# Patient Record
Sex: FEMALE | Race: WHITE | NOT HISPANIC OR LATINO | Employment: OTHER | URBAN - METROPOLITAN AREA
[De-identification: names, ages, dates, MRNs, and addresses within clinical notes are randomized per-mention and may not be internally consistent; named-entity substitution may affect disease eponyms.]

---

## 2020-03-20 ENCOUNTER — OFFICE VISIT (OUTPATIENT)
Dept: URGENT CARE | Facility: CLINIC | Age: 60
End: 2020-03-20
Payer: COMMERCIAL

## 2020-03-20 ENCOUNTER — HOSPITAL ENCOUNTER (EMERGENCY)
Facility: HOSPITAL | Age: 60
Discharge: HOME | End: 2020-03-20
Attending: EMERGENCY MEDICINE | Admitting: EMERGENCY MEDICINE
Payer: COMMERCIAL

## 2020-03-20 VITALS
TEMPERATURE: 98.1 F | SYSTOLIC BLOOD PRESSURE: 162 MMHG | OXYGEN SATURATION: 95 % | DIASTOLIC BLOOD PRESSURE: 84 MMHG | RESPIRATION RATE: 20 BRPM | HEART RATE: 60 BPM

## 2020-03-20 VITALS
HEIGHT: 62 IN | TEMPERATURE: 97.8 F | RESPIRATION RATE: 17 BRPM | SYSTOLIC BLOOD PRESSURE: 157 MMHG | HEART RATE: 58 BPM | DIASTOLIC BLOOD PRESSURE: 98 MMHG | WEIGHT: 190 LBS | BODY MASS INDEX: 34.96 KG/M2 | OXYGEN SATURATION: 96 %

## 2020-03-20 DIAGNOSIS — H11.421 CHEMOSIS OF RIGHT CONJUNCTIVA: ICD-10-CM

## 2020-03-20 DIAGNOSIS — H57.11 EYE PAIN, RIGHT: ICD-10-CM

## 2020-03-20 DIAGNOSIS — H11.31 SUBCONJUNCTIVAL HEMORRHAGE OF RIGHT EYE: Primary | ICD-10-CM

## 2020-03-20 PROCEDURE — 99284 EMERGENCY DEPT VISIT MOD MDM: CPT | Performed by: PHYSICIAN ASSISTANT

## 2020-03-20 PROCEDURE — 99213 OFFICE O/P EST LOW 20 MIN: CPT | Performed by: FAMILY MEDICINE

## 2020-03-20 PROCEDURE — 99283 EMERGENCY DEPT VISIT LOW MDM: CPT

## 2020-03-20 RX ORDER — GABAPENTIN 600 MG/1
1200 TABLET ORAL 3 TIMES DAILY
COMMUNITY
Start: 2020-03-09

## 2020-03-20 RX ORDER — TETRACAINE HYDROCHLORIDE 5 MG/ML
2 SOLUTION OPHTHALMIC ONCE
Status: COMPLETED | OUTPATIENT
Start: 2020-03-20 | End: 2020-03-20

## 2020-03-20 RX ORDER — GABAPENTIN 400 MG/1
600 CAPSULE ORAL EVERY 8 HOURS
COMMUNITY
Start: 2018-11-30

## 2020-03-20 RX ORDER — AZATHIOPRINE 50 MG/1
150 TABLET ORAL DAILY
COMMUNITY
Start: 2020-03-12

## 2020-03-20 RX ORDER — LOSARTAN POTASSIUM 50 MG/1
50 TABLET ORAL DAILY
COMMUNITY
Start: 2020-03-09

## 2020-03-20 RX ORDER — CARBOXYMETHYLCELLULOSE SODIUM 5 MG/ML
1 SOLUTION/ DROPS OPHTHALMIC 3 TIMES DAILY PRN
Qty: 1 BOTTLE | Refills: 0 | Status: SHIPPED | OUTPATIENT
Start: 2020-03-20

## 2020-03-20 RX ORDER — PREDNISONE 1 MG/1
TABLET ORAL
COMMUNITY
Start: 2020-03-16

## 2020-03-20 RX ADMIN — TETRACAINE HYDROCHLORIDE 2 DROP: 5 SOLUTION OPHTHALMIC at 14:04

## 2020-03-20 RX ADMIN — FLUORESCEIN SODIUM 1 STRIP: 1 STRIP OPHTHALMIC at 14:03
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
## 2020-04-01 ENCOUNTER — OFFICE VISIT (OUTPATIENT)
Dept: URGENT CARE | Facility: CLINIC | Age: 60
End: 2020-04-01
Payer: COMMERCIAL

## 2020-04-01 VITALS
TEMPERATURE: 98 F | DIASTOLIC BLOOD PRESSURE: 82 MMHG | SYSTOLIC BLOOD PRESSURE: 130 MMHG | OXYGEN SATURATION: 97 % | HEART RATE: 60 BPM | BODY MASS INDEX: 33.66 KG/M2 | WEIGHT: 190 LBS | RESPIRATION RATE: 16 BRPM | HEIGHT: 63 IN

## 2020-04-01 DIAGNOSIS — J06.9 ACUTE URI: Primary | ICD-10-CM

## 2020-04-01 PROCEDURE — 99213 OFFICE O/P EST LOW 20 MIN: CPT | Performed by: PHYSICIAN ASSISTANT

## 2020-04-01 RX ORDER — ALBUTEROL SULFATE 90 UG/1
2 AEROSOL, METERED RESPIRATORY (INHALATION) EVERY 6 HOURS PRN
COMMUNITY
End: 2020-04-01 | Stop reason: SDUPTHER

## 2020-04-01 RX ORDER — BENZONATATE 100 MG/1
100 CAPSULE ORAL 3 TIMES DAILY PRN
Qty: 20 CAPSULE | Refills: 0 | Status: SHIPPED | OUTPATIENT
Start: 2020-04-01

## 2020-04-01 RX ORDER — FLUTICASONE PROPIONATE 50 MCG
2 SPRAY, SUSPENSION (ML) NASAL DAILY
Qty: 9.9 ML | Refills: 0 | Status: SHIPPED | OUTPATIENT
Start: 2020-04-01

## 2020-04-01 RX ORDER — ALENDRONATE SODIUM 70 MG/1
70 TABLET ORAL
COMMUNITY

## 2020-04-01 RX ORDER — ALBUTEROL SULFATE 90 UG/1
2 AEROSOL, METERED RESPIRATORY (INHALATION) EVERY 6 HOURS PRN
Qty: 18 G | Refills: 0 | Status: SHIPPED | OUTPATIENT
Start: 2020-04-01

## 2022-10-14 ENCOUNTER — APPOINTMENT (EMERGENCY)
Dept: CT IMAGING | Facility: HOSPITAL | Age: 62
DRG: 394 | End: 2022-10-14
Payer: COMMERCIAL

## 2022-10-14 ENCOUNTER — HOSPITAL ENCOUNTER (INPATIENT)
Facility: HOSPITAL | Age: 62
LOS: 5 days | Discharge: HOME | DRG: 394 | End: 2022-10-19
Attending: EMERGENCY MEDICINE | Admitting: STUDENT IN AN ORGANIZED HEALTH CARE EDUCATION/TRAINING PROGRAM
Payer: COMMERCIAL

## 2022-10-14 DIAGNOSIS — M79.89 LEG SWELLING: ICD-10-CM

## 2022-10-14 DIAGNOSIS — K43.9 VENTRAL HERNIA: ICD-10-CM

## 2022-10-14 DIAGNOSIS — R50.9 FEVER, UNSPECIFIED FEVER CAUSE: ICD-10-CM

## 2022-10-14 DIAGNOSIS — N30.00 ACUTE CYSTITIS WITHOUT HEMATURIA: ICD-10-CM

## 2022-10-14 DIAGNOSIS — K56.609 SBO (SMALL BOWEL OBSTRUCTION) (HCC): ICD-10-CM

## 2022-10-14 DIAGNOSIS — K56.609 BOWEL OBSTRUCTION (HCC): Primary | ICD-10-CM

## 2022-10-14 PROBLEM — I82.409 DEEP VEIN THROMBOSIS (DVT) OF LOWER EXTREMITY (HCC): Status: ACTIVE | Noted: 2022-10-14

## 2022-10-14 PROBLEM — IMO0002 LUPUS: Status: ACTIVE | Noted: 2019-11-04

## 2022-10-14 PROBLEM — S36.113A LACERATION OF LIVER: Status: ACTIVE | Noted: 2022-10-14

## 2022-10-14 PROBLEM — M94.1 RELAPSING POLYCHONDRITIS: Status: ACTIVE | Noted: 2017-06-01

## 2022-10-14 PROBLEM — K21.9 GASTROESOPHAGEAL REFLUX DISEASE WITHOUT ESOPHAGITIS: Status: ACTIVE | Noted: 2018-12-04

## 2022-10-14 PROBLEM — D84.9 IMMUNOCOMPROMISED (HCC): Status: ACTIVE | Noted: 2022-09-27

## 2022-10-14 PROBLEM — Z79.52 LONG TERM (CURRENT) USE OF SYSTEMIC STEROIDS: Status: ACTIVE | Noted: 2017-06-01

## 2022-10-14 PROBLEM — M32.9 LUPUS (HCC): Status: ACTIVE | Noted: 2019-11-04

## 2022-10-14 PROBLEM — M06.9 RA (RHEUMATOID ARTHRITIS) (HCC): Status: ACTIVE | Noted: 2022-10-14

## 2022-10-14 PROBLEM — I10 ESSENTIAL HYPERTENSION: Status: ACTIVE | Noted: 2018-12-04

## 2022-10-14 PROBLEM — M35.00 SJOGREN'S SYNDROME (HCC): Status: ACTIVE | Noted: 2020-07-27

## 2022-10-14 LAB
ALBUMIN SERPL BCP-MCNC: 2.7 G/DL (ref 3.5–5)
ALP SERPL-CCNC: 97 U/L (ref 46–116)
ALT SERPL W P-5'-P-CCNC: 19 U/L (ref 12–78)
ANION GAP SERPL CALCULATED.3IONS-SCNC: 6 MMOL/L (ref 4–13)
APTT PPP: 28 SECONDS (ref 23–37)
APTT PPP: 28 SECONDS (ref 23–37)
AST SERPL W P-5'-P-CCNC: 17 U/L (ref 5–45)
BACTERIA UR QL AUTO: ABNORMAL /HPF
BASOPHILS # BLD AUTO: 0.06 THOUSANDS/ΜL (ref 0–0.1)
BASOPHILS NFR BLD AUTO: 1 % (ref 0–1)
BILIRUB SERPL-MCNC: 0.93 MG/DL (ref 0.2–1)
BILIRUB UR QL STRIP: NEGATIVE
BUN SERPL-MCNC: 14 MG/DL (ref 5–25)
CALCIUM ALBUM COR SERPL-MCNC: 8.9 MG/DL (ref 8.3–10.1)
CALCIUM SERPL-MCNC: 7.9 MG/DL (ref 8.3–10.1)
CARDIAC TROPONIN I PNL SERPL HS: 3 NG/L
CARDIAC TROPONIN I PNL SERPL HS: 3 NG/L
CHLORIDE SERPL-SCNC: 97 MMOL/L (ref 96–108)
CLARITY UR: CLEAR
CO2 SERPL-SCNC: 31 MMOL/L (ref 21–32)
COLOR UR: ABNORMAL
CREAT SERPL-MCNC: 0.85 MG/DL (ref 0.6–1.3)
EOSINOPHIL # BLD AUTO: 0.03 THOUSAND/ΜL (ref 0–0.61)
EOSINOPHIL NFR BLD AUTO: 0 % (ref 0–6)
ERYTHROCYTE [DISTWIDTH] IN BLOOD BY AUTOMATED COUNT: 15.3 % (ref 11.6–15.1)
ERYTHROCYTE [DISTWIDTH] IN BLOOD BY AUTOMATED COUNT: 15.6 % (ref 11.6–15.1)
GFR SERPL CREATININE-BSD FRML MDRD: 74 ML/MIN/1.73SQ M
GLUCOSE SERPL-MCNC: 75 MG/DL (ref 65–140)
GLUCOSE UR STRIP-MCNC: NEGATIVE MG/DL
HCT VFR BLD AUTO: 30 % (ref 34.8–46.1)
HCT VFR BLD AUTO: 36.7 % (ref 34.8–46.1)
HGB BLD-MCNC: 11.9 G/DL (ref 11.5–15.4)
HGB BLD-MCNC: 9.5 G/DL (ref 11.5–15.4)
HGB UR QL STRIP.AUTO: NEGATIVE
IMM GRANULOCYTES # BLD AUTO: 0.07 THOUSAND/UL (ref 0–0.2)
IMM GRANULOCYTES NFR BLD AUTO: 1 % (ref 0–2)
INR PPP: 1.05 (ref 0.84–1.19)
KETONES UR STRIP-MCNC: NEGATIVE MG/DL
LACTATE SERPL-SCNC: 1.7 MMOL/L (ref 0.5–2)
LEUKOCYTE ESTERASE UR QL STRIP: ABNORMAL
LIPASE SERPL-CCNC: 65 U/L (ref 73–393)
LYMPHOCYTES # BLD AUTO: 0.87 THOUSANDS/ΜL (ref 0.6–4.47)
LYMPHOCYTES NFR BLD AUTO: 8 % (ref 14–44)
MAGNESIUM SERPL-MCNC: 1.7 MG/DL (ref 1.6–2.6)
MCH RBC QN AUTO: 29.1 PG (ref 26.8–34.3)
MCH RBC QN AUTO: 29.5 PG (ref 26.8–34.3)
MCHC RBC AUTO-ENTMCNC: 31.7 G/DL (ref 31.4–37.4)
MCHC RBC AUTO-ENTMCNC: 32.4 G/DL (ref 31.4–37.4)
MCV RBC AUTO: 91 FL (ref 82–98)
MCV RBC AUTO: 92 FL (ref 82–98)
MONOCYTES # BLD AUTO: 1.2 THOUSAND/ΜL (ref 0.17–1.22)
MONOCYTES NFR BLD AUTO: 11 % (ref 4–12)
NEUTROPHILS # BLD AUTO: 8.45 THOUSANDS/ΜL (ref 1.85–7.62)
NEUTS SEG NFR BLD AUTO: 79 % (ref 43–75)
NITRITE UR QL STRIP: POSITIVE
NON-SQ EPI CELLS URNS QL MICRO: ABNORMAL /HPF
NRBC BLD AUTO-RTO: 0 /100 WBCS
NT-PROBNP SERPL-MCNC: 3131 PG/ML
OTHER STN SPEC: ABNORMAL
PH UR STRIP.AUTO: 6.5 [PH]
PLATELET # BLD AUTO: 295 THOUSANDS/UL (ref 149–390)
PLATELET # BLD AUTO: 364 THOUSANDS/UL (ref 149–390)
PMV BLD AUTO: 9.6 FL (ref 8.9–12.7)
PMV BLD AUTO: 9.7 FL (ref 8.9–12.7)
POTASSIUM SERPL-SCNC: 3.3 MMOL/L (ref 3.5–5.3)
PROCALCITONIN SERPL-MCNC: 0.18 NG/ML
PROT SERPL-MCNC: 6.4 G/DL (ref 6.4–8.4)
PROT UR STRIP-MCNC: NEGATIVE MG/DL
PROTHROMBIN TIME: 13.8 SECONDS (ref 11.6–14.5)
RBC # BLD AUTO: 3.27 MILLION/UL (ref 3.81–5.12)
RBC # BLD AUTO: 4.03 MILLION/UL (ref 3.81–5.12)
RBC #/AREA URNS AUTO: ABNORMAL /HPF
SODIUM SERPL-SCNC: 134 MMOL/L (ref 135–147)
SP GR UR STRIP.AUTO: 1.01 (ref 1–1.03)
UROBILINOGEN UR QL STRIP.AUTO: 1 E.U./DL
WBC # BLD AUTO: 10.68 THOUSAND/UL (ref 4.31–10.16)
WBC # BLD AUTO: 8.26 THOUSAND/UL (ref 4.31–10.16)
WBC #/AREA URNS AUTO: ABNORMAL /HPF

## 2022-10-14 PROCEDURE — 74177 CT ABD & PELVIS W/CONTRAST: CPT

## 2022-10-14 PROCEDURE — 83880 ASSAY OF NATRIURETIC PEPTIDE: CPT | Performed by: EMERGENCY MEDICINE

## 2022-10-14 PROCEDURE — 99285 EMERGENCY DEPT VISIT HI MDM: CPT

## 2022-10-14 PROCEDURE — 93005 ELECTROCARDIOGRAM TRACING: CPT

## 2022-10-14 PROCEDURE — 83690 ASSAY OF LIPASE: CPT | Performed by: EMERGENCY MEDICINE

## 2022-10-14 PROCEDURE — 84484 ASSAY OF TROPONIN QUANT: CPT | Performed by: PHYSICIAN ASSISTANT

## 2022-10-14 PROCEDURE — 81001 URINALYSIS AUTO W/SCOPE: CPT | Performed by: EMERGENCY MEDICINE

## 2022-10-14 PROCEDURE — 71260 CT THORAX DX C+: CPT

## 2022-10-14 PROCEDURE — 83735 ASSAY OF MAGNESIUM: CPT | Performed by: EMERGENCY MEDICINE

## 2022-10-14 PROCEDURE — G1004 CDSM NDSC: HCPCS

## 2022-10-14 PROCEDURE — 99285 EMERGENCY DEPT VISIT HI MDM: CPT | Performed by: EMERGENCY MEDICINE

## 2022-10-14 PROCEDURE — 83605 ASSAY OF LACTIC ACID: CPT | Performed by: EMERGENCY MEDICINE

## 2022-10-14 PROCEDURE — 85730 THROMBOPLASTIN TIME PARTIAL: CPT | Performed by: PHYSICIAN ASSISTANT

## 2022-10-14 PROCEDURE — 36415 COLL VENOUS BLD VENIPUNCTURE: CPT | Performed by: EMERGENCY MEDICINE

## 2022-10-14 PROCEDURE — 85610 PROTHROMBIN TIME: CPT | Performed by: PHYSICIAN ASSISTANT

## 2022-10-14 PROCEDURE — 85027 COMPLETE CBC AUTOMATED: CPT | Performed by: PHYSICIAN ASSISTANT

## 2022-10-14 PROCEDURE — 85025 COMPLETE CBC W/AUTO DIFF WBC: CPT | Performed by: EMERGENCY MEDICINE

## 2022-10-14 PROCEDURE — 84484 ASSAY OF TROPONIN QUANT: CPT | Performed by: EMERGENCY MEDICINE

## 2022-10-14 PROCEDURE — 80053 COMPREHEN METABOLIC PANEL: CPT | Performed by: EMERGENCY MEDICINE

## 2022-10-14 PROCEDURE — 84145 PROCALCITONIN (PCT): CPT | Performed by: PHYSICIAN ASSISTANT

## 2022-10-14 PROCEDURE — 87040 BLOOD CULTURE FOR BACTERIA: CPT | Performed by: EMERGENCY MEDICINE

## 2022-10-14 RX ORDER — PREDNISONE 10 MG/1
10 TABLET ORAL DAILY
Status: DISCONTINUED | OUTPATIENT
Start: 2022-10-15 | End: 2022-10-19 | Stop reason: HOSPADM

## 2022-10-14 RX ORDER — AZATHIOPRINE 50 MG/1
150 TABLET ORAL DAILY
Status: DISCONTINUED | OUTPATIENT
Start: 2022-10-15 | End: 2022-10-19 | Stop reason: HOSPADM

## 2022-10-14 RX ORDER — HYDROXYCHLOROQUINE SULFATE 200 MG/1
200 TABLET, FILM COATED ORAL DAILY
COMMUNITY
Start: 2022-06-22

## 2022-10-14 RX ORDER — HEPARIN SODIUM 1000 [USP'U]/ML
5200 INJECTION, SOLUTION INTRAVENOUS; SUBCUTANEOUS
Status: DISCONTINUED | OUTPATIENT
Start: 2022-10-14 | End: 2022-10-15

## 2022-10-14 RX ORDER — HEPARIN SODIUM 1000 [USP'U]/ML
2600 INJECTION, SOLUTION INTRAVENOUS; SUBCUTANEOUS
Status: DISCONTINUED | OUTPATIENT
Start: 2022-10-14 | End: 2022-10-15

## 2022-10-14 RX ORDER — HEPARIN SODIUM 10000 [USP'U]/100ML
3-30 INJECTION, SOLUTION INTRAVENOUS
Status: DISCONTINUED | OUTPATIENT
Start: 2022-10-14 | End: 2022-10-15

## 2022-10-14 RX ORDER — ONDANSETRON 2 MG/ML
4 INJECTION INTRAMUSCULAR; INTRAVENOUS EVERY 6 HOURS PRN
Status: DISCONTINUED | OUTPATIENT
Start: 2022-10-14 | End: 2022-10-19 | Stop reason: HOSPADM

## 2022-10-14 RX ORDER — CEFEPIME HYDROCHLORIDE 2 G/50ML
2000 INJECTION, SOLUTION INTRAVENOUS EVERY 12 HOURS
Status: DISCONTINUED | OUTPATIENT
Start: 2022-10-14 | End: 2022-10-16

## 2022-10-14 RX ORDER — ACETAMINOPHEN 325 MG/1
650 TABLET ORAL EVERY 6 HOURS PRN
Status: DISCONTINUED | OUTPATIENT
Start: 2022-10-14 | End: 2022-10-19 | Stop reason: HOSPADM

## 2022-10-14 RX ORDER — HEPARIN SODIUM 1000 [USP'U]/ML
5200 INJECTION, SOLUTION INTRAVENOUS; SUBCUTANEOUS ONCE
Status: COMPLETED | OUTPATIENT
Start: 2022-10-14 | End: 2022-10-14

## 2022-10-14 RX ORDER — GABAPENTIN 300 MG/1
600 CAPSULE ORAL EVERY 8 HOURS
Status: DISCONTINUED | OUTPATIENT
Start: 2022-10-14 | End: 2022-10-19 | Stop reason: HOSPADM

## 2022-10-14 RX ORDER — ALBUTEROL SULFATE 90 UG/1
2 AEROSOL, METERED RESPIRATORY (INHALATION) EVERY 6 HOURS PRN
Status: DISCONTINUED | OUTPATIENT
Start: 2022-10-14 | End: 2022-10-19 | Stop reason: HOSPADM

## 2022-10-14 RX ORDER — HYDROXYCHLOROQUINE SULFATE 200 MG/1
200 TABLET, FILM COATED ORAL 2 TIMES DAILY
Status: DISCONTINUED | OUTPATIENT
Start: 2022-10-14 | End: 2022-10-18

## 2022-10-14 RX ADMIN — CEFEPIME HYDROCHLORIDE 2000 MG: 2 INJECTION, SOLUTION INTRAVENOUS at 23:10

## 2022-10-14 RX ADMIN — HEPARIN SODIUM 5200 UNITS: 1000 INJECTION INTRAVENOUS; SUBCUTANEOUS at 21:50

## 2022-10-14 RX ADMIN — IOHEXOL 100 ML: 350 INJECTION, SOLUTION INTRAVENOUS at 17:09

## 2022-10-14 RX ADMIN — GABAPENTIN 600 MG: 300 CAPSULE ORAL at 21:54

## 2022-10-14 RX ADMIN — HEPARIN SODIUM 18 UNITS/KG/HR: 10000 INJECTION, SOLUTION INTRAVENOUS at 21:51
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
## 2022-10-15 ENCOUNTER — APPOINTMENT (INPATIENT)
Dept: NON INVASIVE DIAGNOSTICS | Facility: HOSPITAL | Age: 62
DRG: 394 | End: 2022-10-15
Payer: COMMERCIAL

## 2022-10-15 PROBLEM — S36.113A LACERATION OF LIVER: Status: RESOLVED | Noted: 2022-10-14 | Resolved: 2022-10-15

## 2022-10-15 PROBLEM — N30.00 ACUTE CYSTITIS WITHOUT HEMATURIA: Status: ACTIVE | Noted: 2022-10-15

## 2022-10-15 PROBLEM — Z79.52 LONG TERM (CURRENT) USE OF SYSTEMIC STEROIDS: Status: RESOLVED | Noted: 2017-06-01 | Resolved: 2022-10-15

## 2022-10-15 LAB
2HR DELTA HS TROPONIN: 0 NG/L
4HR DELTA HS TROPONIN: 0 NG/L
ANION GAP SERPL CALCULATED.3IONS-SCNC: 4 MMOL/L (ref 4–13)
APTT PPP: 60 SECONDS (ref 23–37)
APTT PPP: 67 SECONDS (ref 23–37)
BASOPHILS # BLD AUTO: 0.07 THOUSANDS/ΜL (ref 0–0.1)
BASOPHILS NFR BLD AUTO: 1 % (ref 0–1)
BUN SERPL-MCNC: 13 MG/DL (ref 5–25)
CALCIUM SERPL-MCNC: 8.1 MG/DL (ref 8.3–10.1)
CARDIAC TROPONIN I PNL SERPL HS: 3 NG/L
CARDIAC TROPONIN I PNL SERPL HS: 3 NG/L
CHLORIDE SERPL-SCNC: 100 MMOL/L (ref 96–108)
CO2 SERPL-SCNC: 32 MMOL/L (ref 21–32)
CREAT SERPL-MCNC: 0.73 MG/DL (ref 0.6–1.3)
EOSINOPHIL # BLD AUTO: 0.07 THOUSAND/ΜL (ref 0–0.61)
EOSINOPHIL NFR BLD AUTO: 1 % (ref 0–6)
ERYTHROCYTE [DISTWIDTH] IN BLOOD BY AUTOMATED COUNT: 15.7 % (ref 11.6–15.1)
GFR SERPL CREATININE-BSD FRML MDRD: 89 ML/MIN/1.73SQ M
GLUCOSE SERPL-MCNC: 65 MG/DL (ref 65–140)
HCT VFR BLD AUTO: 33.4 % (ref 34.8–46.1)
HGB BLD-MCNC: 10.9 G/DL (ref 11.5–15.4)
IMM GRANULOCYTES # BLD AUTO: 0.1 THOUSAND/UL (ref 0–0.2)
IMM GRANULOCYTES NFR BLD AUTO: 1 % (ref 0–2)
LYMPHOCYTES # BLD AUTO: 1.09 THOUSANDS/ΜL (ref 0.6–4.47)
LYMPHOCYTES NFR BLD AUTO: 9 % (ref 14–44)
MAGNESIUM SERPL-MCNC: 1.7 MG/DL (ref 1.6–2.6)
MCH RBC QN AUTO: 29.9 PG (ref 26.8–34.3)
MCHC RBC AUTO-ENTMCNC: 32.6 G/DL (ref 31.4–37.4)
MCV RBC AUTO: 92 FL (ref 82–98)
MONOCYTES # BLD AUTO: 0.78 THOUSAND/ΜL (ref 0.17–1.22)
MONOCYTES NFR BLD AUTO: 6 % (ref 4–12)
NEUTROPHILS # BLD AUTO: 10.38 THOUSANDS/ΜL (ref 1.85–7.62)
NEUTS SEG NFR BLD AUTO: 82 % (ref 43–75)
NRBC BLD AUTO-RTO: 0 /100 WBCS
PHOSPHATE SERPL-MCNC: 4 MG/DL (ref 2.3–4.1)
PLATELET # BLD AUTO: 323 THOUSANDS/UL (ref 149–390)
PMV BLD AUTO: 9.5 FL (ref 8.9–12.7)
POTASSIUM SERPL-SCNC: 3.9 MMOL/L (ref 3.5–5.3)
RBC # BLD AUTO: 3.64 MILLION/UL (ref 3.81–5.12)
SODIUM SERPL-SCNC: 136 MMOL/L (ref 135–147)
WBC # BLD AUTO: 12.49 THOUSAND/UL (ref 4.31–10.16)

## 2022-10-15 PROCEDURE — 93970 EXTREMITY STUDY: CPT | Performed by: SURGERY

## 2022-10-15 PROCEDURE — 85730 THROMBOPLASTIN TIME PARTIAL: CPT | Performed by: FAMILY MEDICINE

## 2022-10-15 PROCEDURE — 93970 EXTREMITY STUDY: CPT

## 2022-10-15 PROCEDURE — 83735 ASSAY OF MAGNESIUM: CPT | Performed by: PHYSICIAN ASSISTANT

## 2022-10-15 PROCEDURE — 99232 SBSQ HOSP IP/OBS MODERATE 35: CPT | Performed by: STUDENT IN AN ORGANIZED HEALTH CARE EDUCATION/TRAINING PROGRAM

## 2022-10-15 PROCEDURE — 84484 ASSAY OF TROPONIN QUANT: CPT | Performed by: PHYSICIAN ASSISTANT

## 2022-10-15 PROCEDURE — 85025 COMPLETE CBC W/AUTO DIFF WBC: CPT | Performed by: PHYSICIAN ASSISTANT

## 2022-10-15 PROCEDURE — 84100 ASSAY OF PHOSPHORUS: CPT | Performed by: PHYSICIAN ASSISTANT

## 2022-10-15 PROCEDURE — 36415 COLL VENOUS BLD VENIPUNCTURE: CPT | Performed by: PHYSICIAN ASSISTANT

## 2022-10-15 PROCEDURE — 80048 BASIC METABOLIC PNL TOTAL CA: CPT | Performed by: PHYSICIAN ASSISTANT

## 2022-10-15 RX ORDER — SODIUM CHLORIDE, SODIUM GLUCONATE, SODIUM ACETATE, POTASSIUM CHLORIDE, MAGNESIUM CHLORIDE, SODIUM PHOSPHATE, DIBASIC, AND POTASSIUM PHOSPHATE .53; .5; .37; .037; .03; .012; .00082 G/100ML; G/100ML; G/100ML; G/100ML; G/100ML; G/100ML; G/100ML
75 INJECTION, SOLUTION INTRAVENOUS CONTINUOUS
Status: DISCONTINUED | OUTPATIENT
Start: 2022-10-15 | End: 2022-10-16

## 2022-10-15 RX ORDER — HEPARIN SODIUM 5000 [USP'U]/ML
5000 INJECTION, SOLUTION INTRAVENOUS; SUBCUTANEOUS EVERY 8 HOURS SCHEDULED
Status: DISCONTINUED | OUTPATIENT
Start: 2022-10-15 | End: 2022-10-18

## 2022-10-15 RX ADMIN — AZATHIOPRINE 150 MG: 50 TABLET ORAL at 08:09

## 2022-10-15 RX ADMIN — PREDNISONE 10 MG: 10 TABLET ORAL at 08:09

## 2022-10-15 RX ADMIN — HEPARIN SODIUM 5000 UNITS: 5000 INJECTION INTRAVENOUS; SUBCUTANEOUS at 21:03

## 2022-10-15 RX ADMIN — GABAPENTIN 600 MG: 300 CAPSULE ORAL at 04:54

## 2022-10-15 RX ADMIN — GABAPENTIN 600 MG: 300 CAPSULE ORAL at 13:46

## 2022-10-15 RX ADMIN — HYDROXYCHLOROQUINE SULFATE 200 MG: 200 TABLET, FILM COATED ORAL at 17:20

## 2022-10-15 RX ADMIN — SODIUM CHLORIDE, SODIUM GLUCONATE, SODIUM ACETATE, POTASSIUM CHLORIDE, MAGNESIUM CHLORIDE, SODIUM PHOSPHATE, DIBASIC, AND POTASSIUM PHOSPHATE 75 ML/HR: .53; .5; .37; .037; .03; .012; .00082 INJECTION, SOLUTION INTRAVENOUS at 17:20

## 2022-10-15 RX ADMIN — GABAPENTIN 600 MG: 300 CAPSULE ORAL at 21:03

## 2022-10-15 RX ADMIN — HYDROXYCHLOROQUINE SULFATE 200 MG: 200 TABLET, FILM COATED ORAL at 08:08

## 2022-10-15 RX ADMIN — HEPARIN SODIUM 5000 UNITS: 5000 INJECTION INTRAVENOUS; SUBCUTANEOUS at 13:45

## 2022-10-15 RX ADMIN — CEFEPIME HYDROCHLORIDE 2000 MG: 2 INJECTION, SOLUTION INTRAVENOUS at 09:55

## 2022-10-15 RX ADMIN — CEFEPIME HYDROCHLORIDE 2000 MG: 2 INJECTION, SOLUTION INTRAVENOUS at 21:03

## 2022-10-15 RX ADMIN — ACETAMINOPHEN 650 MG: 325 TABLET ORAL at 04:12

## 2022-10-15 NOTE — ASSESSMENT & PLAN NOTE
Patient is a 27-year-old female who is an immunocompromised state due to autoimmune disorders and steroid use has been complaining of fevers for 4 weeks mildly elevated white count with small-bowel obstruction and bilateral lower extremity with heat and mild redness DVT vs cellulities   · Start on cefepime  · Wait for cultures   · procal is pending
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
## 2022-10-15 NOTE — ED NOTES
Pt's PTT 60  No change in heparin gtt rate   Will redraw PTT at [address] [address], [facility] Maninder Zhou  10/15/22 1131
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
## 2022-10-15 NOTE — PROGRESS NOTES
[address]  Progress Note David Green 1960, 63 y o  female MRN: [id number]  Unit/Bed#: ED 10 Encounter: [id number]  Primary Care Provider: MANDY Hogan   Date and time admitted to hospital: 10/14/2022  2:38 PM    Acute cystitis without hematuria  Assessment & Plan  · UA on admission is positive  · Patient is on cefepime for suspected cellulitis, will continue for now  · Follow-up urine culture    Deep vein thrombosis (DVT) of lower extremity ([facility] )  Assessment & Plan  · Concern for DVT / vs celluitic changes   · Vascular study   · Heparin GGT   · History of DVT during hospitalization and prolonged intubation for legionnaires disease many years ago, no longer on anticoagulation    RA (rheumatoid arthritis) ([facility] )  Assessment & Plan  · See plan under lupus    Sjogren's syndrome ([facility] )  Assessment & Plan  · See plan under lupus    Lupus ([place] [id number] )  Assessment & Plan  · Continue Plaquenil, prednisone and Imuran  · Continue gabapentin for discomfort  · Due to autoimmune disorders, patient takes Suboxone for pain management       Abdominal wall hernia  Assessment & Plan  · Reducible ventral wall hernia with no pain on reduction    Gastroesophageal reflux disease without esophagitis  Assessment & Plan  · Continue IV PPI    Essential hypertension  Assessment & Plan  · Patient's BP soft  · Hold PTA Losartan 50 mg daily for now  · Continue to monitor BP    * SBO (small bowel obstruction) ([facility] )  Assessment & Plan  · General surgery following and recommends conservative management for now  · Ventral hernia is reducible  · NPO with sips with meds   · IVF hydration  · Zofran 4 mg IV p r n  For nausea  · Pain management  · Serial abdominal exams      VTE Pharmacologic Prophylaxis:   Moderate Risk (Score 3-4) - Pharmacological DVT Prophylaxis Ordered: heparin drip  Patient Centered Rounds: I performed bedside rounds with nursing staff today    Discussions with Specialists or Other Care Team Provider: General surgery    Education and Discussions with Family / Patient: Patient declined call to   Time Spent for Care: 30 minutes  More than 50% of total time spent on counseling and coordination of care as described above  Current Length of Stay: 1 day(s)  Current Patient Status: Inpatient   Certification Statement: The patient will continue to require additional inpatient hospital stay due to Small-bowel obstruction  Discharge Plan: Anticipate discharge in 48-72 hrs to home  Code Status: Level 1 - Full Code    Subjective:   Patient seen and examined at bedside  Endorses abdominal pain when she bends forward  Otherwise patient endorses improvement in her nausea and vomiting  Endorses improvement in lower extremity edema as well  Objective:     Vitals:   Temp (24hrs), Av 3 °F (37 4 °C), Min:98 2 °F (36 8 °C), Max:101 4 °F (38 6 °C)    Temp:  [98 2 °F (36 8 °C)-101 4 °F (38 6 °C)] 98 2 °F (36 8 °C)  HR:  [] 81  Resp:  [16-48] 16  BP: ()/(52-84) 104/58  SpO2:  [86 %-100 %] 93 %  Body mass index is 26 22 kg/m²  Input and Output Summary (last 24 hours): Intake/Output Summary (Last 24 hours) at 10/15/2022 1109  Last data filed at 10/15/2022 0420  Gross per 24 hour   Intake 272 15 ml   Output --   Net 272 15 ml       Physical Exam:   Physical Exam  Vitals and nursing note reviewed  Constitutional:       General: She is not in acute distress  Appearance: She is well-developed  HENT:      Head: Normocephalic and atraumatic  Eyes:      Conjunctiva/sclera: Conjunctivae normal    Cardiovascular:      Rate and Rhythm: Normal rate and regular rhythm  Heart sounds: No murmur heard  Pulmonary:      Effort: Pulmonary effort is normal  No respiratory distress  Breath sounds: Normal breath sounds  Abdominal:      Palpations: Abdomen is soft  Tenderness: There is abdominal tenderness  There is guarding     Musculoskeletal:      Cervical back: Neck supple  Left lower leg: Edema present  Skin:     General: Skin is warm and dry  Neurological:      Mental Status: She is alert  Additional Data:     Labs:  Results from last 7 days   Lab Units 10/15/22  0420   WBC Thousand/uL 12 49*   HEMOGLOBIN g/dL 10 9*   HEMATOCRIT % 33 4*   PLATELETS Thousands/uL 323   NEUTROS PCT % 82*   LYMPHS PCT % 9*   MONOS PCT % 6   EOS PCT % 1     Results from last 7 days   Lab Units 10/15/22  0420 10/14/22  1503   SODIUM mmol/L 136 134*   POTASSIUM mmol/L 3 9 3 3*   CHLORIDE mmol/L 100 97   CO2 mmol/L 32 31   BUN mg/dL 13 14   CREATININE mg/dL 0 73 0 85   ANION GAP mmol/L 4 6   CALCIUM mg/dL 8 1* 7 9*   ALBUMIN g/dL  --  2 7*   TOTAL BILIRUBIN mg/dL  --  0 93   ALK PHOS U/L  --  97   ALT U/L  --  19   AST U/L  --  17   GLUCOSE RANDOM mg/dL 65 75     Results from last 7 days   Lab Units 10/14/22  2150   INR  1 05             Results from last 7 days   Lab Units 10/14/22  2150 10/14/22  1509   LACTIC ACID mmol/L  --  1 7   PROCALCITONIN ng/ml 0 18  --        Lines/Drains:  Invasive Devices  Report    Peripheral Intravenous Line  Duration           Peripheral IV 10/14/22 Left;Proximal;Ventral (anterior) Forearm <1 day    Peripheral IV 10/14/22 Right Forearm <1 day                      Imaging:  Reviewed    Recent Cultures (last 7 days):   Results from last 7 days   Lab Units 10/14/22  1507 10/14/22  1503   BLOOD CULTURE  Received in Microbiology Lab  Culture in Progress  Received in Microbiology Lab  Culture in Progress         Last 24 Hours Medication List:   Current Facility-Administered Medications   Medication Dose Route Frequency Provider Last Rate   • acetaminophen  650 mg Oral Q6H PRN Nina Hayward PA-C     • albuterol  2 puff Inhalation Q6H PRN Nina Hayward PA-C     • azaTHIOprine  150 mg Oral Daily Nina Hayward PA-C     • cefepime  2,000 mg Intravenous Q12H Nina Hayward PA-C 2,000 mg (10/15/22 0955)   • gabapentin  600 mg Oral Q8H Nina Hayward PA-C     • heparin (porcine)  3-30 Units/kg/hr (Order-Specific) Intravenous Titrated Violeta S Josie, CRNP 18 Units/kg/hr (10/15/22 0457)   • heparin (porcine)  2,600 Units Intravenous Q1H PRN Violeta S Josie, CRNP     • heparin (porcine)  5,200 Units Intravenous Q1H PRN Violeta S Josie, CRNP     • hydroxychloroquine  200 mg Oral BID Nina Hayward PA-C     • influenza vaccine  0 5 mL Intramuscular Prior to discharge Deanna Elizabeth MD     • ondansetron  4 mg Intravenous Q6H PRN Nina Hayward PA-C     • predniSONE  10 mg Oral Daily Nina Hayward PA-C          Today, Patient Was Seen By: Dave Szymanski    **Please Note: This note may have been constructed using a voice recognition system  **

## 2022-10-15 NOTE — ASSESSMENT & PLAN NOTE
· UA on admission is positive  · Patient is on cefepime for suspected cellulitis, will continue for now  · Follow-up urine culture

## 2022-10-15 NOTE — ASSESSMENT & PLAN NOTE
· Continue Plaquenil, prednisone and Imuran  · Continue gabapentin for discomfort  · Due to autoimmune disorders, patient takes Suboxone for pain management

## 2022-10-15 NOTE — ASSESSMENT & PLAN NOTE
Patient has an extensive history of immunocompromise / out to immune disorders she does have lupus Sjogren syndrome and rheumatoid arthritis  · Continue Plaquenil, prednisone and Imuran  · Continue gabapentin for discomfort  · Due to out a mean disorders patient takes Suboxone for pain management   · Consider counseling Pain Management for continuation

## 2022-10-15 NOTE — ASSESSMENT & PLAN NOTE
· General surgery following and recommends conservative management for now  · Ventral hernia is reducible  · NPO with sips with meds   · IVF hydration  · Zofran 4 mg IV p r n   For nausea  · Pain management  · Serial abdominal exams
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
## 2022-10-15 NOTE — ASSESSMENT & PLAN NOTE
· Concern for DVT / vs celluitic changes   · Vascular study   · Heparin GGT   · History of DVT during hospitalization and prolonged intubation for legionnaires disease many years ago, no longer on anticoagulation

## 2022-10-16 PROBLEM — I82.409 DEEP VEIN THROMBOSIS (DVT) OF LOWER EXTREMITY (HCC): Status: RESOLVED | Noted: 2022-10-14 | Resolved: 2022-10-16

## 2022-10-16 LAB
ANION GAP SERPL CALCULATED.3IONS-SCNC: 6 MMOL/L (ref 4–13)
BASOPHILS # BLD AUTO: 0.04 THOUSANDS/ΜL (ref 0–0.1)
BASOPHILS NFR BLD AUTO: 1 % (ref 0–1)
BUN SERPL-MCNC: 18 MG/DL (ref 5–25)
CALCIUM SERPL-MCNC: 7.7 MG/DL (ref 8.3–10.1)
CHLORIDE SERPL-SCNC: 103 MMOL/L (ref 96–108)
CO2 SERPL-SCNC: 27 MMOL/L (ref 21–32)
CREAT SERPL-MCNC: 0.63 MG/DL (ref 0.6–1.3)
EOSINOPHIL # BLD AUTO: 0.09 THOUSAND/ΜL (ref 0–0.61)
EOSINOPHIL NFR BLD AUTO: 1 % (ref 0–6)
ERYTHROCYTE [DISTWIDTH] IN BLOOD BY AUTOMATED COUNT: 15.2 % (ref 11.6–15.1)
GFR SERPL CREATININE-BSD FRML MDRD: 97 ML/MIN/1.73SQ M
GLUCOSE SERPL-MCNC: 58 MG/DL (ref 65–140)
HCT VFR BLD AUTO: 30.8 % (ref 34.8–46.1)
HCV AB SER QL: NORMAL
HGB BLD-MCNC: 9.8 G/DL (ref 11.5–15.4)
IMM GRANULOCYTES # BLD AUTO: 0.07 THOUSAND/UL (ref 0–0.2)
IMM GRANULOCYTES NFR BLD AUTO: 1 % (ref 0–2)
LYMPHOCYTES # BLD AUTO: 1.28 THOUSANDS/ΜL (ref 0.6–4.47)
LYMPHOCYTES NFR BLD AUTO: 16 % (ref 14–44)
MCH RBC QN AUTO: 29.7 PG (ref 26.8–34.3)
MCHC RBC AUTO-ENTMCNC: 31.8 G/DL (ref 31.4–37.4)
MCV RBC AUTO: 93 FL (ref 82–98)
MONOCYTES # BLD AUTO: 0.73 THOUSAND/ΜL (ref 0.17–1.22)
MONOCYTES NFR BLD AUTO: 9 % (ref 4–12)
NEUTROPHILS # BLD AUTO: 5.77 THOUSANDS/ΜL (ref 1.85–7.62)
NEUTS SEG NFR BLD AUTO: 72 % (ref 43–75)
NRBC BLD AUTO-RTO: 0 /100 WBCS
PLATELET # BLD AUTO: 301 THOUSANDS/UL (ref 149–390)
PMV BLD AUTO: 9.4 FL (ref 8.9–12.7)
POTASSIUM SERPL-SCNC: 4 MMOL/L (ref 3.5–5.3)
RBC # BLD AUTO: 3.3 MILLION/UL (ref 3.81–5.12)
SODIUM SERPL-SCNC: 136 MMOL/L (ref 135–147)
WBC # BLD AUTO: 7.98 THOUSAND/UL (ref 4.31–10.16)

## 2022-10-16 PROCEDURE — 80048 BASIC METABOLIC PNL TOTAL CA: CPT | Performed by: STUDENT IN AN ORGANIZED HEALTH CARE EDUCATION/TRAINING PROGRAM

## 2022-10-16 PROCEDURE — 99232 SBSQ HOSP IP/OBS MODERATE 35: CPT | Performed by: STUDENT IN AN ORGANIZED HEALTH CARE EDUCATION/TRAINING PROGRAM

## 2022-10-16 PROCEDURE — 86803 HEPATITIS C AB TEST: CPT | Performed by: NURSE PRACTITIONER

## 2022-10-16 PROCEDURE — 85025 COMPLETE CBC W/AUTO DIFF WBC: CPT | Performed by: STUDENT IN AN ORGANIZED HEALTH CARE EDUCATION/TRAINING PROGRAM

## 2022-10-16 RX ORDER — DEXTROSE AND SODIUM CHLORIDE 5; .9 G/100ML; G/100ML
75 INJECTION, SOLUTION INTRAVENOUS CONTINUOUS
Status: DISCONTINUED | OUTPATIENT
Start: 2022-10-16 | End: 2022-10-18

## 2022-10-16 RX ORDER — CEFTRIAXONE 1 G/50ML
1000 INJECTION, SOLUTION INTRAVENOUS EVERY 24 HOURS
Status: DISCONTINUED | OUTPATIENT
Start: 2022-10-16 | End: 2022-10-18

## 2022-10-16 RX ADMIN — GABAPENTIN 600 MG: 300 CAPSULE ORAL at 05:45

## 2022-10-16 RX ADMIN — CEFEPIME HYDROCHLORIDE 2000 MG: 2 INJECTION, SOLUTION INTRAVENOUS at 10:32

## 2022-10-16 RX ADMIN — CEFTRIAXONE 1000 MG: 1 INJECTION, SOLUTION INTRAVENOUS at 20:52

## 2022-10-16 RX ADMIN — ACETAMINOPHEN 650 MG: 325 TABLET ORAL at 08:56

## 2022-10-16 RX ADMIN — HYDROXYCHLOROQUINE SULFATE 200 MG: 200 TABLET, FILM COATED ORAL at 17:46

## 2022-10-16 RX ADMIN — SODIUM CHLORIDE, SODIUM GLUCONATE, SODIUM ACETATE, POTASSIUM CHLORIDE, MAGNESIUM CHLORIDE, SODIUM PHOSPHATE, DIBASIC, AND POTASSIUM PHOSPHATE 75 ML/HR: .53; .5; .37; .037; .03; .012; .00082 INJECTION, SOLUTION INTRAVENOUS at 05:52

## 2022-10-16 RX ADMIN — HEPARIN SODIUM 5000 UNITS: 5000 INJECTION INTRAVENOUS; SUBCUTANEOUS at 20:52

## 2022-10-16 RX ADMIN — PREDNISONE 10 MG: 10 TABLET ORAL at 08:56

## 2022-10-16 RX ADMIN — AZATHIOPRINE 150 MG: 50 TABLET ORAL at 08:56

## 2022-10-16 RX ADMIN — HEPARIN SODIUM 5000 UNITS: 5000 INJECTION INTRAVENOUS; SUBCUTANEOUS at 14:18

## 2022-10-16 RX ADMIN — HEPARIN SODIUM 5000 UNITS: 5000 INJECTION INTRAVENOUS; SUBCUTANEOUS at 05:45

## 2022-10-16 RX ADMIN — DEXTROSE AND SODIUM CHLORIDE 75 ML/HR: 5; .9 INJECTION, SOLUTION INTRAVENOUS at 08:55

## 2022-10-16 RX ADMIN — HYDROXYCHLOROQUINE SULFATE 200 MG: 200 TABLET, FILM COATED ORAL at 09:05

## 2022-10-16 RX ADMIN — GABAPENTIN 600 MG: 300 CAPSULE ORAL at 20:51

## 2022-10-16 RX ADMIN — GABAPENTIN 600 MG: 300 CAPSULE ORAL at 14:16

## 2022-10-16 NOTE — PLAN OF CARE
Problem: MOBILITY - ADULT  Goal: Maintain or return to baseline ADL function  Description: INTERVENTIONS:  -  Assess patient's ability to carry out ADLs; assess patient's baseline for ADL function and identify physical deficits which impact ability to perform ADLs (bathing, care of mouth/teeth, toileting, grooming, dressing, etc )  - Assess/evaluate cause of self-care deficits   - Assess range of motion  - Assess patient's mobility; develop plan if impaired  - Assess patient's need for assistive devices and provide as appropriate  - Encourage maximum independence but intervene and supervise when necessary  - Involve family in performance of ADLs  - Assess for home care needs following discharge   - Consider OT consult to assist with ADL evaluation and planning for discharge  - Provide patient education as appropriate  Outcome: Progressing  Goal: Maintains/Returns to pre admission functional level  Description: INTERVENTIONS:  - Perform BMAT or MOVE assessment daily    - Set and communicate daily mobility goal to care team and patient/family/caregiver     - Collaborate with rehabilitation services on mobility goals if consulted  - Out of bed for toileting  - Record patient progress and toleration of activity level   Outcome: Progressing     Problem: Prexisting or High Potential for Compromised Skin Integrity  Goal: Skin integrity is maintained or improved  Description: INTERVENTIONS:  - Identify patients at risk for skin breakdown  - Assess and monitor skin integrity  - Assess and monitor nutrition and hydration status  - Monitor labs   - Assess for incontinence   - Turn and reposition patient  - Assist with mobility/ambulation  - Relieve pressure over bony prominences  - Avoid friction and shearing  - Provide appropriate hygiene as needed including keeping skin clean and dry  - Evaluate need for skin moisturizer/barrier cream  - Collaborate with interdisciplinary team   - Patient/family teaching  - Consider wound care consult   Outcome: Progressing     Problem: Nutrition/Hydration-ADULT  Goal: Nutrient/Hydration intake appropriate for improving, restoring or maintaining nutritional needs  Description: Monitor and assess patient's nutrition/hydration status for malnutrition  Collaborate with interdisciplinary team and initiate plan and interventions as ordered  Monitor patient's weight and dietary intake as ordered or per policy  Utilize nutrition screening tool and intervene as necessary  Determine patient's food preferences and provide high-protein, high-caloric foods as appropriate       INTERVENTIONS:  - Monitor oral intake, urinary output, labs, and treatment plans  - Assess nutrition and hydration status and recommend course of action  - Evaluate amount of meals eaten  - Assist patient with eating if necessary   - Allow adequate time for meals  - Recommend/ encourage appropriate diets, oral nutritional supplements, and vitamin/mineral supplements  - Order, calculate, and assess calorie counts as needed  - Recommend, monitor, and adjust tube feedings and TPN/PPN based on assessed needs  - Assess need for intravenous fluids  - Provide specific nutrition/hydration education as appropriate  - Include patient/family/caregiver in decisions related to nutrition  Outcome: Progressing     Problem: Potential for Falls  Goal: Patient will remain free of falls  Description: INTERVENTIONS:  - Educate patient/family on patient safety including physical limitations  - Instruct patient to call for assistance with activity   - Consult OT/PT to assist with strengthening/mobility   - Keep Call bell within reach  - Keep bed low and locked with side rails adjusted as appropriate  - Keep care items and personal belongings within reach  - Initiate and maintain comfort rounds  Outcome: Progressing     Problem: GASTROINTESTINAL - ADULT  Goal: Minimal or absence of nausea and/or vomiting  Description: INTERVENTIONS:  - Administer IV fluids if ordered to ensure adequate hydration  - Maintain NPO status until nausea and vomiting are resolved  - Nasogastric tube if ordered  - Administer ordered antiemetic medications as needed  - Provide nonpharmacologic comfort measures as appropriate  - Advance diet as tolerated, if ordered  - Consider nutrition services referral to assist patient with adequate nutrition and appropriate food choices  Outcome: Progressing  Goal: Maintains or returns to baseline bowel function  Description: INTERVENTIONS:  - Assess bowel function  - Encourage oral fluids to ensure adequate hydration  - Administer IV fluids if ordered to ensure adequate hydration  - Administer ordered medications as needed  - Encourage mobilization and activity  - Consider nutritional services referral to assist patient with adequate nutrition and appropriate food choices  Outcome: Progressing  Goal: Maintains adequate nutritional intake  Description: INTERVENTIONS:  - Monitor percentage of each meal consumed  - Identify factors contributing to decreased intake, treat as appropriate  - Assist with meals as needed  - Monitor I&O, weight, and lab values if indicated  - Obtain nutrition services referral as needed  Outcome: Progressing  Goal: Establish and maintain optimal ostomy function  Description: INTERVENTIONS:  - Assess bowel function  - Encourage oral fluids to ensure adequate hydration  - Administer IV fluids if ordered to ensure adequate hydration   - Administer ordered medications as needed  - Encourage mobilization and activity  - Nutrition services referral to assist patient with appropriate food choices  - Assess stoma site  - Consider wound care consult   Outcome: Progressing  Goal: Oral mucous membranes remain intact  Description: INTERVENTIONS  - Assess oral mucosa and hygiene practices  - Implement preventative oral hygiene regimen  - Implement oral medicated treatments as ordered  - Initiate Nutrition services referral as needed  Outcome: Progressing     Problem: METABOLIC, FLUID AND ELECTROLYTES - ADULT  Goal: Electrolytes maintained within normal limits  Description: INTERVENTIONS:  - Monitor labs and assess patient for signs and symptoms of electrolyte imbalances  - Administer electrolyte replacement as ordered  - Monitor response to electrolyte replacements, including repeat lab results as appropriate  - Instruct patient on fluid and nutrition as appropriate  Outcome: Progressing  Goal: Fluid balance maintained  Description: INTERVENTIONS:  - Monitor labs   - Monitor I/O and WT  - Instruct patient on fluid and nutrition as appropriate  - Assess for signs & symptoms of volume excess or deficit  Outcome: Progressing  Goal: Glucose maintained within target range  Description: INTERVENTIONS:  - Monitor Blood Glucose as ordered  - Assess for signs and symptoms of hyperglycemia and hypoglycemia  - Administer ordered medications to maintain glucose within target range  - Assess nutritional intake and initiate nutrition service referral as needed  Outcome: Progressing

## 2022-10-16 NOTE — ASSESSMENT & PLAN NOTE
· UA on admission is positive  · Switch cefepime to ceftriaxone, anticipate switching patient to p o   Antibiotic On discharge  · No cultures sent

## 2022-10-16 NOTE — UTILIZATION REVIEW
Initial Clinical Review    Admission: Date/Time/Statement:   Admission Orders (From admission, onward)     Ordered        10/14/22 2011  INPATIENT ADMISSION  Once                      Orders Placed This Encounter   Procedures   • INPATIENT ADMISSION     Standing Status:   Standing     Number of Occurrences:   1     Order Specific Question:   Level of Care     Answer:   Med Surg [16]     Order Specific Question:   Estimated length of stay     Answer:   More than 2 Midnights     Order Specific Question:   Certification     Answer:   I certify that inpatient services are medically necessary for this patient for a duration of greater than two midnights  See H&P and MD Progress Notes for additional information about the patient's course of treatment  ED Arrival Information     Expected   -    Arrival   10/14/2022 13:57    Acuity   Urgent            Means of arrival   Walk-In    Escorted by   Self    Service   Hospitalist    Admission type   Emergency            Arrival complaint   Abdominal pain, leg swelling           Chief Complaint   Patient presents with   • Abdominal Pain     Pt arrives reporting central abdominal pain since yesterday  Pt reports fevers at home, BLE edema, and generalized weakness  Pt saw pcp and had blood work and covid test and all came back negative  Pt reports this feels similar to previous bowel obstruction, last BM 1 week ago  Initial Presentation: 63 y o  female presents to ED from home with abdominal pain,  Feels  Similar to prior  Bowel obstruction  Has been febrile for past  3-4  Weeks,  Has not felt well  Bilateral lower extremity edema noted with redness and positive Aurora's sign with concern for infection  PMH  Is  Legionella   Pneumonia and  Intubated for 2 weeks  At  [facility] in  2015, [facility]  X2  Due to adhesions,  HTN, GERD,  Extensive auto immune history, Lupus, RA and long term steroid use  CT scan suspicious for obstruction    Admit  Ip  With SBO, DVT, abdominal wall hernia and plan is   NPO, surgery consult, vascular study, antiemetics and pain control as needed and  IV heparin  Surgery consult  ( 10/14)   Incomplete small bowel obstruction secondary to ventral hernia and intra-abdominal adhesions  Keep NPO   Continue serial abdominal exams  Continue  IVF  Infraumbilical hernia defect that is readily reducible  Has  B/L  Calf tenderness  Date:   10/15       Day 2:   Remains NPO with IVF  Abdominal pain slowly  Improving  Denies nausea or vomiting  D/C  IV heparin  Continue TINA  And follow u p cultures  Continue conservative management  SBO        ED Triage Vitals   Temperature Pulse Respirations Blood Pressure SpO2   10/14/22 1423 10/14/22 1423 10/14/22 1423 10/14/22 1423 10/14/22 1423   99 °F (37 2 °C) 102 18 119/84 96 %      Temp Source Heart Rate Source Patient Position - Orthostatic VS BP Location FiO2 (%)   10/14/22 1423 10/14/22 1545 10/14/22 1545 10/14/22 1545 --   Temporal Monitor Lying Right arm       Pain Score       10/14/22 1423       8          Wt Readings from Last 1 Encounters:   10/16/22 67 1 kg (148 lb)     Additional Vital Signs:   15/22 15:43:56 98 6 °F (37 °C) 72 18 107/64 78 93 % -- -- -- --   10/15/22 1500 -- 76 20 107/68 -- 94 % -- -- None (Room air) Lying   10/15/22 1102 98 2 °F (36 8 °C) 81 16 104/58 -- 93 % -- -- None (Room air) Lying   10/15/22 1000 -- 83 39 Abnormal  -- -- 93 % -- -- -- --   10/15/22 0800 99 3 °F (37 4 °C) 90 20 90/52 -- 95 % 28 2 L/min Nasal cannula Lying   10/15/22 0700 -- 93 28 Abnormal  -- -- 90 % -- -- -- --   10/15/22 0600 -- 97 21 -- -- 90 % -- -- -- --   10/15/22 0500 -- 103 48 Abnormal  -- -- 93 % -- -- -- --   10/15/22 0400 101 4 °F (38 6 °C) Abnormal  83 29 Abnormal  128/62 89 100 % -- -- None (Room air) Lying   10/15/22 0300 -- 78 36 Abnormal  -- -- 97 % -- -- -- --   10/15/22 0200 -- 80 26 Abnormal  -- -- 94 % -- -- -- --   10/15/22 0100 -- 83 35 Abnormal  -- -- 86 % Abnormal  -- -- -- -- 10/15/22 0000 -- 81 32 Abnormal  -- -- 91 % -- -- -- --   10/14/22 2300 98 7 °F (37 1 °C) 82 37 Abnormal  110/59 81 90 % -- -- None (Room air) Lying   10/14/22 2200 -- 91 42 Abnormal  110/59 81 98 % -- -- -- --   10/14/22 2145 -- 83 24 Abnormal  -- -- 96 % -- -- -- --   10/14/22 2130 -- 83 37 Abnormal  -- -- 94 % -- -- -- --   10/14/22 2115 -- 85 32 Abnormal  -- -- 94 % -- -- -- --   10/14/22 2045 -- 95 20 128/73 93 94 % -- -- -- --   10/14/22 2030 -- 93 20 101/55 75 91 % -- -- -- --   10/14/22 1645 -- 93 20 103/63 78 95 % -- -- None (Room air) Lying   10/14/22 1630 -- 93 20 104/65 78 95 % -- -- None (Room air) Lying   10/14/22 1600 -- 97 20 103/62 77 92 % -- -- -- --   10/14/22 1545 -- 98 20 102/58 75 92 % -- -- None (Room air) Lying   10/14/22 1423 99 °F (37 2 °C) 102 18 119/84 92 96 % -- -- -- --       Pertinent Labs/Diagnostic Test Results:   VAS lower limb venous duplex study, complete bilateral   Final Result by Riaz Massey MD (10/15 2218)      CT chest abdomen pelvis w contrast   Final Result by Silke Wood DO (10/14 1858)      Ventral abdominal wall hernia containing a short segment loop of small bowel with intra-abdominal adjacent short segment small bowel dilation  There are additional prominent caliber small bowel loops containing air-fluid levels  Findings are suspicious    for partial or low-grade obstruction  Additionally there are closely apposed bowel loops to the ventral abdominal wall which may be contributory  Large colonic stool burden  Subtle area of decreased attenuation in the right hepatic lobe laterally may represent focal fatty infiltration  Comparison with prior imaging recommended  Additional findings as above  The study was marked in [facility] for immediate notification        Workstation performed: [id number]               Results from last 7 days   Lab Units 10/16/22  0544 10/15/22  0420 10/14/22  2150 10/14/22  1503   WBC Thousand/uL 7 98 12 49* 8 26 10 68* HEMOGLOBIN g/dL 9 8* 10 9* 9 5* 11 9   HEMATOCRIT % 30 8* 33 4* 30 0* 36 7   PLATELETS Thousands/uL 301 323 295 364   NEUTROS ABS Thousands/µL 5 77 10 38*  --  8 45*         Results from last 7 days   Lab Units 10/16/22  0544 10/15/22  0420 10/14/22  1503   SODIUM mmol/L 136 136 134*   POTASSIUM mmol/L 4 0 3 9 3 3*   CHLORIDE mmol/L 103 100 97   CO2 mmol/L 27 32 31   ANION GAP mmol/L 6 4 6   BUN mg/dL 18 13 14   CREATININE mg/dL 0 63 0 73 0 85   EGFR ml/min/1 73sq m 97 89 74   CALCIUM mg/dL 7 7* 8 1* 7 9*   MAGNESIUM mg/dL  --  1 7 1 7   PHOSPHORUS mg/dL  --  4 0  --      Results from last 7 days   Lab Units 10/14/22  1503   AST U/L 17   ALT U/L 19   ALK PHOS U/L 97   TOTAL PROTEIN g/dL 6 4   ALBUMIN g/dL 2 7*   TOTAL BILIRUBIN mg/dL 0 93         Results from last 7 days   Lab Units 10/16/22  0544 10/15/22  0420 10/14/22  1503   GLUCOSE RANDOM mg/dL 58* 65 75           Results from last 7 days   Lab Units 10/15/22  1104 10/15/22  0133 10/14/22  2150 10/14/22  1503   HS TNI 0HR ng/L  --   --  3 3   HS TNI 2HR ng/L 3  --   --   --    HSTNI D2 ng/L 0  --   --   --    HS TNI 4HR ng/L  --  3  --   --    HSTNI D4 ng/L  --  0  --   --          Results from last 7 days   Lab Units 10/15/22  1102 10/15/22  0423 10/14/22  2150   PROTIME seconds  --   --  13 8   INR   --   --  1 05   PTT seconds 60* 67* 28  28         Results from last 7 days   Lab Units 10/14/22  2150   PROCALCITONIN ng/ml 0 18     Results from last 7 days   Lab Units 10/14/22  1509   LACTIC ACID mmol/L 1 7             Results from last 7 days   Lab Units 10/14/22  1503   NT-PRO BNP pg/mL 3,131*             Results from last 7 days   Lab Units 10/14/22  1503   LIPASE u/L 65*                 Results from last 7 days   Lab Units 10/14/22  1503   CLARITY UA  Clear   COLOR UA  Thalia   SPEC GRAV UA  1 015   PH UA  6 5   GLUCOSE UA mg/dl Negative   KETONES UA mg/dl Negative   BLOOD UA  Negative   PROTEIN UA mg/dl Negative   NITRITE UA  Positive*   BILIRUBIN UA Negative   UROBILINOGEN UA E U /dl 1 0   LEUKOCYTES UA  Trace*   WBC UA /hpf 4-10*   RBC UA /hpf None Seen   BACTERIA UA /hpf Innumerable*   EPITHELIAL CELLS WET PREP /hpf None Seen                                 Results from last 7 days   Lab Units 10/14/22  1507 10/14/22  1503   BLOOD CULTURE  No Growth at 24 hrs  No Growth at 24 hrs                 ED Treatment:   Medication Administration from 10/14/2022 1355 to 10/15/2022 1540       Date/Time Order Dose Route Action Comments     10/14/2022 1709 iohexol (OMNIPAQUE) 350 MG/ML injection (SINGLE-DOSE) 100 mL 100 mL Intravenous Given      10/15/2022 0412 acetaminophen (TYLENOL) tablet 650 mg 650 mg Oral Given      10/15/2022 0809 azaTHIOprine (IMURAN) tablet 150 mg 150 mg Oral Given      10/15/2022 1346 gabapentin (NEURONTIN) capsule 600 mg 600 mg Oral Given      10/15/2022 0454 gabapentin (NEURONTIN) capsule 600 mg 600 mg Oral Given      10/14/2022 2154 gabapentin (NEURONTIN) capsule 600 mg 600 mg Oral Given      10/15/2022 0808 hydroxychloroquine (PLAQUENIL) tablet 200 mg 200 mg Oral Given      10/14/2022 2154 hydroxychloroquine (PLAQUENIL) tablet 200 mg 200 mg Oral Not Given took 2 tablets this am     10/15/2022 0809 predniSONE tablet 10 mg 10 mg Oral Given      10/14/2022 2150 heparin (porcine) injection 5,200 Units 5,200 Units Intravenous Given      10/15/2022 1342 heparin (porcine) 25,000 units in 0 45% NaCl 250 mL infusion (premix) 0 Units/kg/hr Intravenous Stopped      10/15/2022 0457 heparin (porcine) 25,000 units in 0 45% NaCl 250 mL infusion (premix) 18 Units/kg/hr Intravenous Rate/Dose Verify PTT-67     10/14/2022 2151 heparin (porcine) 25,000 units in 0 45% NaCl 250 mL infusion (premix) 18 Units/kg/hr Intravenous New Bag      10/15/2022 0955 cefepime (MAXIPIME) IVPB (premix in dextrose) 2,000 mg 50 mL 2,000 mg Intravenous New Bag      10/15/2022 0000 cefepime (MAXIPIME) IVPB (premix in dextrose) 2,000 mg 50 mL 0 mg Intravenous Stopped      10/14/2022 2310 cefepime (MAXIPIME) IVPB (premix in dextrose) 2,000 mg 50 mL 2,000 mg Intravenous New Bag      10/15/2022 1345 heparin (porcine) subcutaneous injection 5,000 Units 5,000 Units Subcutaneous Given         Present on Admission:  • Abdominal wall hernia  • Essential hypertension  • Gastroesophageal reflux disease without esophagitis  • (Resolved) Laceration of liver  • Lupus (HCC)  • Sjogren's syndrome (HCC)  • SBO (small bowel obstruction) (HCC)      Admitting Diagnosis: Bowel obstruction (HCC) [K56 609]  Abdominal pain [R10 9]  Age/Sex: 63 y o  female  Admission Orders:  Scheduled Medications:  azaTHIOprine, 150 mg, Oral, Daily  cefepime, 2,000 mg, Intravenous, Q12H  gabapentin, 600 mg, Oral, Q8H  heparin (porcine), 5,000 Units, Subcutaneous, Q8H [address]  hydroxychloroquine, 200 mg, Oral, BID  predniSONE, 10 mg, Oral, Daily      Continuous IV Infusions:  dextrose 5 % and sodium chloride 0 9 %, 75 mL/hr, Intravenous, Continuous  IV  Heparin - d/c   10/15    IV  Plasmalyte  75 / hr - d/c   10/16      PRN Meds:  acetaminophen, 650 mg, Oral, Q6H PRN  albuterol, 2 puff, Inhalation, Q6H PRN  influenza vaccine, 0 5 mL, Intramuscular, Prior to discharge  ondansetron, 4 mg, Intravenous, Q6H PRN        IP CONSULT TO ACUTE CARE SURGERY    Network Utilization Review Department  ATTENTION: Please call with any questions or concerns to [phone] and carefully listen to the prompts so that you are directed to the right person  All voicemails are confidential   Alvina Grand all requests for admission clinical reviews, approved or denied determinations and any other requests to dedicated fax number below belonging to the campus where the patient is receiving treatment  List of dedicated fax numbers for the Facilities:  [address] [place] [address] DENIALS (Administrative/Medical Necessity) [phone]   [address]  (Maternity/NICU/Pediatrics) [phone]   [address] [phone]     [address] [facility] 77 [phone]   [address] [facility] 435 [facility] [place] [address] D[facility] Hospit[facility] [address] [facility] 310 [facility] 134 033 [facility] [phone]

## 2022-10-16 NOTE — PROGRESS NOTES
Progress Note - General Surgery   Frutoso [place] 63 y o  female MRN: [id number]  Unit/Bed#: -01 Encounter: [id number]    Assessment:  Incomplete small bowel obstruction and reducible ventral hernia  Obstruction most likely secondary to intra-abdominal adhesions  Plan:  Increased to clear liquid diet with toast and crackers  Subjective/Objective       Subjective:  Patient states that she has no abdominal pain and is passing flatus  No bowel movement at this time       Objective:     Blood pressure 107/61, pulse 66, temperature 98 6 °F (37 °C), temperature source Temporal, resp  rate 17, height 5' 3" (1 6 m), weight 67 1 kg (148 lb), SpO2 90 %  ,Body mass index is 26 22 kg/m²  Intake/Output Summary (Last 24 hours) at 10/16/2022 1014  Last data filed at 10/16/2022 0855  Gross per 24 hour   Intake 414 8 ml   Output 1300 ml   Net -885 2 ml       Invasive Devices  Report    Peripheral Intravenous Line  Duration           Peripheral IV 10/14/22 Left;Proximal;Ventral (anterior) Forearm 1 day    Peripheral IV 10/14/22 Right Forearm 1 day                Physical Exam:  Patient is awake alert in no distress  Abdomen is soft and nontender  She has a midline reducible ventral hernia at the infraumbilical area  No rigidity guarding or rebound is noted      Lab, Imaging and other studies:  CBC:   Lab Results   Component Value Date    WBC 7 98 10/16/2022    HGB 9 8 (L) 10/16/2022    HCT 30 8 (L) 10/16/2022    MCV 93 10/16/2022     10/16/2022    MCH 29 7 10/16/2022    MCHC 31 8 10/16/2022    RDW 15 2 (H) 10/16/2022    MPV 9 4 10/16/2022    NRBC 0 10/16/2022

## 2022-10-16 NOTE — ASSESSMENT & PLAN NOTE
· General surgery following and recommends conservative management for now  · Ventral hernia is reducible  · Diet advanced to clear liquid diet which patient is tolerating well at this time  · IVF hydration  · Zofran 4 mg IV p r n   For nausea  · Pain management  · Serial abdominal exams  · Patient has started to pass gas, no bowel movements yet

## 2022-10-16 NOTE — PROGRESS NOTES
[address]  Progress Note Karo Trimble 1960, 63 y o  female MRN: [id number]  Unit/Bed#: -01 Encounter: [id number]  Primary Care Provider: MANDY Nunez   Date and time admitted to hospital: 10/14/2022  2:38 PM    Acute cystitis without hematuria  Assessment & Plan  · UA on admission is positive  · Switch cefepime to ceftriaxone, anticipate switching patient to p o  Antibiotic On discharge  · No cultures sent    RA (rheumatoid arthritis) ([facility] [address] )  Assessment & Plan  · See plan under lupus    Sjogren's syndrome ([facility] )  Assessment & Plan  · See plan under lupus    Lupus (HCC)  Assessment & Plan  · Continue Plaquenil, prednisone and Imuran  · Continue gabapentin for discomfort  · Due to autoimmune disorders, patient takes Suboxone for pain management       Abdominal wall hernia  Assessment & Plan  · Reducible ventral wall hernia with no pain on reduction    Gastroesophageal reflux disease without esophagitis  Assessment & Plan  · Continue IV PPI    Essential hypertension  Assessment & Plan  · Patient's BP soft  · Hold PTA Losartan 50 mg daily for now  · Continue to monitor BP    * SBO (small bowel obstruction) (HCC)  Assessment & Plan  · General surgery following and recommends conservative management for now  · Ventral hernia is reducible  · Diet advanced to clear liquid diet which patient is tolerating well at this time  · IVF hydration  · Zofran 4 mg IV p r n  For nausea  · Pain management  · Serial abdominal exams  · Patient has started to pass gas, no bowel movements yet      Deep vein thrombosis (DVT) of lower extremity (HCC)-resolved as of 10/16/2022  Assessment & Plan  · Bilateral venous duplex scans showing no DVT  · Discontinue heparin drip        VTE Pharmacologic Prophylaxis:   Moderate Risk (Score 3-4) - Pharmacological DVT Prophylaxis Ordered: heparin  Patient Centered Rounds: I performed bedside rounds with nursing staff today    Discussions with Specialists or Other Care Team Provider:  General surgery    Education and Discussions with Family / Patient: Patient  Time Spent for Care: 30 minutes  More than 50% of total time spent on counseling and coordination of care as described above  Current Length of Stay: 2 day(s)  Current Patient Status: Inpatient   Certification Statement: The patient will continue to require additional inpatient hospital stay due to SBO  Discharge Plan: Anticipate discharge in 24-48 hrs to home  Code Status: Level 1 - Full Code    Subjective:   Patient seen and examined at bedside  No events overnight  Abdominal pain improved from last night  Tolerating clear liquid diet at this time  Passing gas but no bowel movements yet  Patient also states that she has been having foul-smelling urine for a couple of days now    Objective:     Vitals:   Temp (24hrs), Av 3 °F (36 8 °C), Min:97 7 °F (36 5 °C), Max:98 6 °F (37 °C)    Temp:  [97 7 °F (36 5 °C)-98 6 °F (37 °C)] 97 7 °F (36 5 °C)  HR:  [66-86] 86  Resp:  [16-18] 18  BP: (107-132)/(61-97) 132/97  SpO2:  [90 %-93 %] 92 %  Body mass index is 26 22 kg/m²  Input and Output Summary (last 24 hours): Intake/Output Summary (Last 24 hours) at 10/16/2022 1502  Last data filed at 10/16/2022 1208  Gross per 24 hour   Intake 863 5 ml   Output 1300 ml   Net -436 5 ml       Physical Exam:   Physical Exam  Vitals and nursing note reviewed  Constitutional:       General: She is not in acute distress  Appearance: She is well-developed  HENT:      Head: Normocephalic and atraumatic  Eyes:      Conjunctiva/sclera: Conjunctivae normal    Cardiovascular:      Rate and Rhythm: Normal rate and regular rhythm  Heart sounds: No murmur heard  Pulmonary:      Effort: Pulmonary effort is normal  No respiratory distress  Breath sounds: Normal breath sounds  Abdominal:      Palpations: Abdomen is soft  Tenderness: There is abdominal tenderness  There is no guarding  Musculoskeletal:      Cervical back: Neck supple  Right lower leg: No edema  Left lower leg: No edema  Skin:     General: Skin is warm and dry  Neurological:      Mental Status: She is alert  Additional Data:     Labs:  Results from last 7 days   Lab Units 10/16/22  0544   WBC Thousand/uL 7 98   HEMOGLOBIN g/dL 9 8*   HEMATOCRIT % 30 8*   PLATELETS Thousands/uL 301   NEUTROS PCT % 72   LYMPHS PCT % 16   MONOS PCT % 9   EOS PCT % 1     Results from last 7 days   Lab Units 10/16/22  0544 10/15/22  0420 10/14/22  1503   SODIUM mmol/L 136   < > 134*   POTASSIUM mmol/L 4 0   < > 3 3*   CHLORIDE mmol/L 103   < > 97   CO2 mmol/L 27   < > 31   BUN mg/dL 18   < > 14   CREATININE mg/dL 0 63   < > 0 85   ANION GAP mmol/L 6   < > 6   CALCIUM mg/dL 7 7*   < > 7 9*   ALBUMIN g/dL  --   --  2 7*   TOTAL BILIRUBIN mg/dL  --   --  0 93   ALK PHOS U/L  --   --  97   ALT U/L  --   --  19   AST U/L  --   --  17   GLUCOSE RANDOM mg/dL 58*   < > 75    < > = values in this interval not displayed  Results from last 7 days   Lab Units 10/14/22  2150   INR  1 05             Results from last 7 days   Lab Units 10/14/22  2150 10/14/22  1509   LACTIC ACID mmol/L  --  1 7   PROCALCITONIN ng/ml 0 18  --        Lines/Drains:  Invasive Devices  Report    Peripheral Intravenous Line  Duration           Peripheral IV 10/14/22 Right Forearm 2 days    Peripheral IV 10/14/22 Left;Proximal;Ventral (anterior) Forearm 1 day                      Imaging: reviewed    Recent Cultures (last 7 days):   Results from last 7 days   Lab Units 10/14/22  1507 10/14/22  1503   BLOOD CULTURE  No Growth at 24 hrs  No Growth at 24 hrs         Last 24 Hours Medication List:   Current Facility-Administered Medications   Medication Dose Route Frequency Provider Last Rate   • acetaminophen  650 mg Oral Q6H PRN Nina Hayward PA-C     • albuterol  2 puff Inhalation Q6H PRN Nina Hayward PA-C     • azaTHIOprine  150 mg Oral Daily Nina Hayward PA-C     • [START ON 10/17/2022] cefTRIAXone  1,000 mg Intravenous Q24H Sunita Calhoun MD     • dextrose 5 % and sodium chloride 0 9 %  75 mL/hr Intravenous Continuous Moises Calhoun MD 75 mL/hr (10/16/22 0855)   • gabapentin  600 mg Oral Q8H Nina Hayward PA-C     • heparin (porcine)  5,000 Units Subcutaneous [facility] Moises Calhoun MD     • hydroxychloroquine  200 mg Oral BID Nina Hayward PA-C     • influenza vaccine  0 5 mL Intramuscular Prior to discharge Bhanu Tovar MD     • ondansetron  4 mg Intravenous Q6H PRN Nina Hayward PA-C     • predniSONE  10 mg Oral Daily Nina Hayward PA-C          Today, Patient Was Seen By: Moises Szymanski    **Please Note: This note may have been constructed using a voice recognition system  **

## 2022-10-16 NOTE — PLAN OF CARE
Problem: Potential for Falls  Goal: Patient will remain free of falls  Description: INTERVENTIONS:  - Educate patient/family on patient safety including physical limitations  - Instruct patient to call for assistance with activity   - Consult OT/PT to assist with strengthening/mobility   - Keep Call bell within reach  - Keep bed low and locked with side rails adjusted as appropriate  - Keep care items and personal belongings within reach  - Initiate and maintain comfort rounds  - Make Fall Risk Sign visible to staff  - Offer Toileting every  Hours, in advance of need  - Initiate/Maintain alarm  - Obtain necessary fall risk management equipment  - Apply yellow socks and bracelet for high fall risk patients  - Consider moving patient to room near nurses station  Outcome: Progressing     Problem: GASTROINTESTINAL - ADULT  Goal: Minimal or absence of nausea and/or vomiting  Description: INTERVENTIONS:  - Administer IV fluids if ordered to ensure adequate hydration  - Maintain NPO status until nausea and vomiting are resolved  - Nasogastric tube if ordered  - Administer ordered antiemetic medications as needed  - Provide nonpharmacologic comfort measures as appropriate  - Advance diet as tolerated, if ordered  - Consider nutrition services referral to assist patient with adequate nutrition and appropriate food choices  Outcome: Progressing     Problem: METABOLIC, FLUID AND ELECTROLYTES - ADULT  Goal: Electrolytes maintained within normal limits  Description: INTERVENTIONS:  - Monitor labs and assess patient for signs and symptoms of electrolyte imbalances  - Administer electrolyte replacement as ordered  - Monitor response to electrolyte replacements, including repeat lab results as appropriate  - Instruct patient on fluid and nutrition as appropriate  Outcome: Progressing

## 2022-10-17 ENCOUNTER — APPOINTMENT (INPATIENT)
Dept: NON INVASIVE DIAGNOSTICS | Facility: HOSPITAL | Age: 62
DRG: 394 | End: 2022-10-17
Payer: COMMERCIAL

## 2022-10-17 LAB
ALBUMIN SERPL BCP-MCNC: 1.9 G/DL (ref 3.5–5)
ALP SERPL-CCNC: 70 U/L (ref 46–116)
ALT SERPL W P-5'-P-CCNC: 16 U/L (ref 12–78)
ANION GAP SERPL CALCULATED.3IONS-SCNC: 3 MMOL/L (ref 4–13)
AORTIC ROOT: 3.4 CM
AORTIC VALVE MEAN VELOCITY: 9.4 M/S
APICAL FOUR CHAMBER EJECTION FRACTION: 62 %
ASCENDING AORTA: 2.9 CM
AST SERPL W P-5'-P-CCNC: 13 U/L (ref 5–45)
AV AREA BY CONTINUOUS VTI: 3.1 CM2
AV AREA PEAK VELOCITY: 2.8 CM2
AV LVOT MEAN GRADIENT: 2 MMHG
AV LVOT PEAK GRADIENT: 4 MMHG
AV MEAN GRADIENT: 4 MMHG
AV PEAK GRADIENT: 8 MMHG
AV VALVE AREA: 3.1 CM2
AV VELOCITY RATIO: 0.74
BASOPHILS # BLD AUTO: 0.03 THOUSANDS/ΜL (ref 0–0.1)
BASOPHILS NFR BLD AUTO: 1 % (ref 0–1)
BILIRUB SERPL-MCNC: 0.46 MG/DL (ref 0.2–1)
BUN SERPL-MCNC: 14 MG/DL (ref 5–25)
CALCIUM ALBUM COR SERPL-MCNC: 9.3 MG/DL (ref 8.3–10.1)
CALCIUM SERPL-MCNC: 7.6 MG/DL (ref 8.3–10.1)
CHLORIDE SERPL-SCNC: 105 MMOL/L (ref 96–108)
CO2 SERPL-SCNC: 30 MMOL/L (ref 21–32)
CREAT SERPL-MCNC: 0.58 MG/DL (ref 0.6–1.3)
DOP CALC AO PEAK VEL: 1.4 M/S
DOP CALC AO VTI: 29.96 CM
DOP CALC LVOT AREA: 3.8 CM2
DOP CALC LVOT DIAMETER: 2.2 CM
DOP CALC LVOT PEAK VEL VTI: 24.45 CM
DOP CALC LVOT PEAK VEL: 1.04 M/S
DOP CALC LVOT STROKE INDEX: 51.7 ML/M2
DOP CALC LVOT STROKE VOLUME: 92.9
E WAVE DECELERATION TIME: 192 MS
EOSINOPHIL # BLD AUTO: 0.07 THOUSAND/ΜL (ref 0–0.61)
EOSINOPHIL NFR BLD AUTO: 1 % (ref 0–6)
ERYTHROCYTE [DISTWIDTH] IN BLOOD BY AUTOMATED COUNT: 15.3 % (ref 11.6–15.1)
FRACTIONAL SHORTENING: 20 (ref 28–44)
GFR SERPL CREATININE-BSD FRML MDRD: 100 ML/MIN/1.73SQ M
GLUCOSE SERPL-MCNC: 95 MG/DL (ref 65–140)
HCT VFR BLD AUTO: 30.9 % (ref 34.8–46.1)
HGB BLD-MCNC: 9.9 G/DL (ref 11.5–15.4)
IMM GRANULOCYTES # BLD AUTO: 0.04 THOUSAND/UL (ref 0–0.2)
IMM GRANULOCYTES NFR BLD AUTO: 1 % (ref 0–2)
INTERVENTRICULAR SEPTUM IN DIASTOLE (PARASTERNAL SHORT AXIS VIEW): 1.1 CM
INTERVENTRICULAR SEPTUM: 1.1 CM (ref 0.6–1.1)
LAAS-AP2: 20.7 CM2
LAAS-AP4: 19.5 CM2
LEFT ATRIUM SIZE: 4.2 CM
LEFT INTERNAL DIMENSION IN SYSTOLE: 3.6 CM (ref 2.1–4)
LEFT VENTRICLE DIASTOLIC VOLUME (MOD BIPLANE): 132 ML
LEFT VENTRICLE SYSTOLIC VOLUME (MOD BIPLANE): 62 ML
LEFT VENTRICULAR INTERNAL DIMENSION IN DIASTOLE: 4.5 CM (ref 3.5–6)
LEFT VENTRICULAR POSTERIOR WALL IN END DIASTOLE: 1.5 CM
LEFT VENTRICULAR STROKE VOLUME: 36 ML
LV EF: 53 %
LVSV (TEICH): 36 ML
LYMPHOCYTES # BLD AUTO: 1.18 THOUSANDS/ΜL (ref 0.6–4.47)
LYMPHOCYTES NFR BLD AUTO: 21 % (ref 14–44)
MCH RBC QN AUTO: 29.8 PG (ref 26.8–34.3)
MCHC RBC AUTO-ENTMCNC: 32 G/DL (ref 31.4–37.4)
MCV RBC AUTO: 93 FL (ref 82–98)
MONOCYTES # BLD AUTO: 0.76 THOUSAND/ΜL (ref 0.17–1.22)
MONOCYTES NFR BLD AUTO: 13 % (ref 4–12)
MV E'TISSUE VEL-LAT: 10 CM/S
MV E'TISSUE VEL-SEP: 9 CM/S
MV PEAK A VEL: 0.69 M/S
MV PEAK E VEL: 71 CM/S
MV STENOSIS PRESSURE HALF TIME: 56 MS
MV VALVE AREA P 1/2 METHOD: 3.93
NEUTROPHILS # BLD AUTO: 3.69 THOUSANDS/ΜL (ref 1.85–7.62)
NEUTS SEG NFR BLD AUTO: 63 % (ref 43–75)
NRBC BLD AUTO-RTO: 0 /100 WBCS
PLATELET # BLD AUTO: 322 THOUSANDS/UL (ref 149–390)
PMV BLD AUTO: 9.6 FL (ref 8.9–12.7)
POTASSIUM SERPL-SCNC: 3.7 MMOL/L (ref 3.5–5.3)
PROT SERPL-MCNC: 5.1 G/DL (ref 6.4–8.4)
PV PEAK GRADIENT: 5 MMHG
RBC # BLD AUTO: 3.32 MILLION/UL (ref 3.81–5.12)
RIGHT ATRIUM AREA SYSTOLE A4C: 9.2 CM2
RIGHT VENTRICLE ID DIMENSION: 3 CM
SL CV LEFT ATRIUM LENGTH A2C: 5.4 CM
SL CV PED ECHO LEFT VENTRICLE DIASTOLIC VOLUME (MOD BIPLANE) 2D: 91 ML
SL CV PED ECHO LEFT VENTRICLE SYSTOLIC VOLUME (MOD BIPLANE) 2D: 55 ML
SODIUM SERPL-SCNC: 138 MMOL/L (ref 135–147)
TR MAX PG: 25 MMHG
TR PEAK VELOCITY: 2.5 M/S
TRICUSPID VALVE PEAK REGURGITATION VELOCITY: 2.49 M/S
WBC # BLD AUTO: 5.77 THOUSAND/UL (ref 4.31–10.16)

## 2022-10-17 PROCEDURE — 85025 COMPLETE CBC W/AUTO DIFF WBC: CPT | Performed by: STUDENT IN AN ORGANIZED HEALTH CARE EDUCATION/TRAINING PROGRAM

## 2022-10-17 PROCEDURE — 80053 COMPREHEN METABOLIC PANEL: CPT | Performed by: STUDENT IN AN ORGANIZED HEALTH CARE EDUCATION/TRAINING PROGRAM

## 2022-10-17 PROCEDURE — 99232 SBSQ HOSP IP/OBS MODERATE 35: CPT | Performed by: STUDENT IN AN ORGANIZED HEALTH CARE EDUCATION/TRAINING PROGRAM

## 2022-10-17 PROCEDURE — 93306 TTE W/DOPPLER COMPLETE: CPT

## 2022-10-17 RX ADMIN — CEFTRIAXONE 1000 MG: 1 INJECTION, SOLUTION INTRAVENOUS at 20:41

## 2022-10-17 RX ADMIN — GABAPENTIN 600 MG: 300 CAPSULE ORAL at 20:42

## 2022-10-17 RX ADMIN — DEXTROSE AND SODIUM CHLORIDE 75 ML/HR: 5; .9 INJECTION, SOLUTION INTRAVENOUS at 00:18

## 2022-10-17 RX ADMIN — HEPARIN SODIUM 5000 UNITS: 5000 INJECTION INTRAVENOUS; SUBCUTANEOUS at 13:10

## 2022-10-17 RX ADMIN — HEPARIN SODIUM 5000 UNITS: 5000 INJECTION INTRAVENOUS; SUBCUTANEOUS at 20:41

## 2022-10-17 RX ADMIN — AZATHIOPRINE 150 MG: 50 TABLET ORAL at 09:22

## 2022-10-17 RX ADMIN — HEPARIN SODIUM 5000 UNITS: 5000 INJECTION INTRAVENOUS; SUBCUTANEOUS at 05:54

## 2022-10-17 RX ADMIN — PREDNISONE 10 MG: 10 TABLET ORAL at 09:22

## 2022-10-17 RX ADMIN — GABAPENTIN 600 MG: 300 CAPSULE ORAL at 13:10

## 2022-10-17 RX ADMIN — DEXTROSE AND SODIUM CHLORIDE 75 ML/HR: 5; .9 INJECTION, SOLUTION INTRAVENOUS at 13:10

## 2022-10-17 RX ADMIN — HYDROXYCHLOROQUINE SULFATE 200 MG: 200 TABLET, FILM COATED ORAL at 17:43

## 2022-10-17 RX ADMIN — GABAPENTIN 600 MG: 300 CAPSULE ORAL at 05:53

## 2022-10-17 RX ADMIN — HYDROXYCHLOROQUINE SULFATE 200 MG: 200 TABLET, FILM COATED ORAL at 09:22

## 2022-10-17 NOTE — PLAN OF CARE
Problem: MOBILITY - ADULT  Goal: Maintain or return to baseline ADL function  Description: INTERVENTIONS:  -  Assess patient's ability to carry out ADLs; assess patient's baseline for ADL function and identify physical deficits which impact ability to perform ADLs (bathing, care of mouth/teeth, toileting, grooming, dressing, etc )  - Assess/evaluate cause of self-care deficits   - Assess range of motion  - Assess patient's mobility; develop plan if impaired  - Assess patient's need for assistive devices and provide as appropriate  - Encourage maximum independence but intervene and supervise when necessary  - Involve family in performance of ADLs  - Assess for home care needs following discharge   - Consider OT consult to assist with ADL evaluation and planning for discharge  - Provide patient education as appropriate  Outcome: Progressing  Goal: Maintains/Returns to pre admission functional level  Description: INTERVENTIONS:  - Perform BMAT or MOVE assessment daily    - Set and communicate daily mobility goal to care team and patient/family/caregiver  - Collaborate with rehabilitation services on mobility goals if consulted  - Perform Range of Motion  times a day  - Reposition patient every  hours    - Dangle patient  times a day  - Stand patient  times a day  - Ambulate patient  times a day  - Out of bed to chair  times a day   - Out of bed for meals times a day  - Out of bed for toileting  - Record patient progress and toleration of activity level   Outcome: Progressing     Problem: Prexisting or High Potential for Compromised Skin Integrity  Goal: Skin integrity is maintained or improved  Description: INTERVENTIONS:  - Identify patients at risk for skin breakdown  - Assess and monitor skin integrity  - Assess and monitor nutrition and hydration status  - Monitor labs   - Assess for incontinence   - Turn and reposition patient  - Assist with mobility/ambulation  - Relieve pressure over bony prominences  - Avoid friction and shearing  - Provide appropriate hygiene as needed including keeping skin clean and dry  - Evaluate need for skin moisturizer/barrier cream  - Collaborate with interdisciplinary team   - Patient/family teaching  - Consider wound care consult   Outcome: Progressing     Problem: Nutrition/Hydration-ADULT  Goal: Nutrient/Hydration intake appropriate for improving, restoring or maintaining nutritional needs  Description: Monitor and assess patient's nutrition/hydration status for malnutrition  Collaborate with interdisciplinary team and initiate plan and interventions as ordered  Monitor patient's weight and dietary intake as ordered or per policy  Utilize nutrition screening tool and intervene as necessary  Determine patient's food preferences and provide high-protein, high-caloric foods as appropriate       INTERVENTIONS:  - Monitor oral intake, urinary output, labs, and treatment plans  - Assess nutrition and hydration status and recommend course of action  - Evaluate amount of meals eaten  - Assist patient with eating if necessary   - Allow adequate time for meals  - Recommend/ encourage appropriate diets, oral nutritional supplements, and vitamin/mineral supplements  - Order, calculate, and assess calorie counts as needed  - Recommend, monitor, and adjust tube feedings and TPN/PPN based on assessed needs  - Assess need for intravenous fluids  - Provide specific nutrition/hydration education as appropriate  - Include patient/family/caregiver in decisions related to nutrition  Outcome: Progressing

## 2022-10-17 NOTE — ASSESSMENT & PLAN NOTE
· General surgery following and recommends conservative management for now  · Ventral hernia is reducible  · Diet advanced to clear liquid diet with toast and crackers - continues to tolerate gradual advancement in diet  · IVF hydration  · Zofran 4 mg IV p r n   For nausea  · Pain management  · Serial abdominal exams  · Patient has started to pass gas, no bowel movements yet however she does feel like she has to go

## 2022-10-17 NOTE — PROGRESS NOTES
[address]  Progress Note Ange Sunshine 1960, 63 y o  female MRN: [id number]  Unit/Bed#: -01 Encounter: [id number]  Primary Care Provider: MANDY Leyva   Date and time admitted to hospital: 10/14/2022  2:38 PM    Acute cystitis without hematuria  Assessment & Plan  · UA on admission is positive  · Switch cefepime to ceftriaxone, anticipate switching patient to p o  Antibiotic On discharge  · No cultures sent as UA done in ED did not reflex to cultures - urine sample thrown out     RA (rheumatoid arthritis) ([facility])  Assessment & Plan  · See plan under lupus    Sjogren's syndrome ([facility] [address] )  Assessment & Plan  · See plan under lupus    Lupus ([facility])  Assessment & Plan  · Continue Plaquenil, prednisone and Imuran  · Continue gabapentin for discomfort  · Due to autoimmune disorders, patient takes Suboxone for pain management       Abdominal wall hernia  Assessment & Plan  · Reducible ventral wall hernia with no pain on reduction    Gastroesophageal reflux disease without esophagitis  Assessment & Plan  · Continue IV PPI    Essential hypertension  Assessment & Plan  · Patient's BP soft  · Hold PTA Losartan 50 mg daily for now  · Continue to monitor BP    * SBO (small bowel obstruction) ([facility])  Assessment & Plan  · General surgery following and recommends conservative management for now  · Ventral hernia is reducible  · Diet advanced to clear liquid diet with toast and crackers - continues to tolerate gradual advancement in diet  · IVF hydration  · Zofran 4 mg IV p r n  For nausea  · Pain management  · Serial abdominal exams  · Patient has started to pass gas, no bowel movements yet however she does feel like she has to go          VTE Pharmacologic Prophylaxis:   Moderate Risk (Score 3-4) - Pharmacological DVT Prophylaxis Ordered: heparin  Patient Centered Rounds: I performed bedside rounds with nursing staff today    Discussions with Specialists or Other Care Team Provider: Gen surg    Education and Discussions with Family / Patient: Patient declined call to   Time Spent for Care: 30 minutes  More than 50% of total time spent on counseling and coordination of care as described above  Current Length of Stay: 3 day(s)  Current Patient Status: Inpatient   Certification Statement: The patient will continue to require additional inpatient hospital stay due to Acute diverticulitis, gradual advancement of diet  Discharge Plan: Anticipate discharge in 24-48 hrs to home  Code Status: Level 1 - Full Code    Subjective:   Patient seen and examined at bedside  Pain is a lot better than yesterday  Tolerating clears today  Chest congestion has improved  Objective:     Vitals:   Temp (24hrs), Av 8 °F (36 6 °C), Min:97 7 °F (36 5 °C), Max:98 1 °F (36 7 °C)    Temp:  [97 7 °F (36 5 °C)-98 1 °F (36 7 °C)] 98 1 °F (36 7 °C)  HR:  [62-86] 62  Resp:  [18] 18  BP: (113-132)/(64-97) 127/83  SpO2:  [91 %-95 %] 95 %  Body mass index is 26 93 kg/m²  Input and Output Summary (last 24 hours): Intake/Output Summary (Last 24 hours) at 10/17/2022 1146  Last data filed at 10/17/2022 1101  Gross per 24 hour   Intake 2665 75 ml   Output 700 ml   Net 1965 75 ml       Physical Exam:   Physical Exam  Vitals and nursing note reviewed  Constitutional:       General: She is not in acute distress  Appearance: She is well-developed  HENT:      Head: Normocephalic and atraumatic  Eyes:      Conjunctiva/sclera: Conjunctivae normal    Cardiovascular:      Rate and Rhythm: Normal rate and regular rhythm  Heart sounds: No murmur heard  Pulmonary:      Effort: Pulmonary effort is normal  No respiratory distress  Breath sounds: Normal breath sounds  Abdominal:      Palpations: Abdomen is soft  Tenderness: There is no abdominal tenderness  There is no guarding  Musculoskeletal:      Cervical back: Neck supple  Skin:     General: Skin is warm and dry  Neurological:      Mental Status: She is alert  Additional Data:     Labs:  Results from last 7 days   Lab Units 10/17/22  0502   WBC Thousand/uL 5 77   HEMOGLOBIN g/dL 9 9*   HEMATOCRIT % 30 9*   PLATELETS Thousands/uL 322   NEUTROS PCT % 63   LYMPHS PCT % 21   MONOS PCT % 13*   EOS PCT % 1     Results from last 7 days   Lab Units 10/17/22  0502   SODIUM mmol/L 138   POTASSIUM mmol/L 3 7   CHLORIDE mmol/L 105   CO2 mmol/L 30   BUN mg/dL 14   CREATININE mg/dL 0 58*   ANION GAP mmol/L 3*   CALCIUM mg/dL 7 6*   ALBUMIN g/dL 1 9*   TOTAL BILIRUBIN mg/dL 0 46   ALK PHOS U/L 70   ALT U/L 16   AST U/L 13   GLUCOSE RANDOM mg/dL 95     Results from last 7 days   Lab Units 10/14/22  2150   INR  1 05             Results from last 7 days   Lab Units 10/14/22  2150 10/14/22  1509   LACTIC ACID mmol/L  --  1 7   PROCALCITONIN ng/ml 0 18  --        Lines/Drains:  Invasive Devices  Report    Peripheral Intravenous Line  Duration           Peripheral IV 10/14/22 Left;Proximal;Ventral (anterior) Forearm 2 days    Peripheral IV 10/14/22 Right Forearm 2 days                      Imaging:  Reviewed    Recent Cultures (last 7 days):   Results from last 7 days   Lab Units 10/14/22  1507 10/14/22  1503   BLOOD CULTURE  No Growth at 48 hrs  No Growth at 48 hrs         Last 24 Hours Medication List:   Current Facility-Administered Medications   Medication Dose Route Frequency Provider Last Rate   • acetaminophen  650 mg Oral Q6H PRN Nina Hayward PA-C     • albuterol  2 puff Inhalation Q6H PRN Nina Hayward PA-C     • azaTHIOprine  150 mg Oral Daily Nina Hayward PA-C     • cefTRIAXone  1,000 mg Intravenous Q24H Marichuy Calhoun MD 1,000 mg (10/16/22 2052)   • dextrose 5 % and sodium chloride 0 9 %  75 mL/hr Intravenous Continuous Estil Len Calhoun MD 75 mL/hr (10/17/22 0018)   • gabapentin  600 mg Oral Q8H Nina Hayward PA-C     • heparin (porcine)  5,000 Units Subcutaneous [facility] Earline Whitaker Query MD Unique     • hydroxychloroquine  200 mg Oral BID Nina Hayward PA-C     • influenza vaccine  0 5 mL Intramuscular Prior to discharge Johan Blackman MD     • ondansetron  4 mg Intravenous Q6H PRN Nina Hayward PA-C     • predniSONE  10 mg Oral Daily Nina Hayward PA-C          Today, Patient Was Seen By: Keyla Szymanski    **Please Note: This note may have been constructed using a voice recognition system  **

## 2022-10-17 NOTE — UTILIZATION REVIEW
NOTIFICATION OF INPATIENT ADMISSION   AUTHORIZATION REQUEST   SERVICING FACILITY:   [address] [place] [address]  Lebron Varghese [address] [place] [address], [address]  Tax ID: [id number]  NPI: [id number] ATTENDING PROVIDER:  Attending Name and NPI#: Deja Neri [place], [place] [[id number]]  Address: [facility]  Lebron Varghese [address] [place] [address], [address]  Phone: [phone]   ADMISSION INFORMATION:  Place of Service: [place] [address]  Place of Service Code: 21  Inpatient Admission Date/Time: 10/14/22  8:11 PM  Discharge Date/Time: No discharge date for patient encounter  Admitting Diagnosis Code/Description:  Bowel obstruction ([facility] [address] ) [K56 609]  Abdominal pain [R10 9]     UTILIZATION REVIEW CONTACT:  Ashvin Krishnan, Utilization   Network Utilization Review Department  Phone: [phone]  Fax [phone]  Email: [email or web address]  Contact for approvals/pending authorizations, clinical reviews, and discharge  PHYSICIAN ADVISORY SERVICES:  Medical Necessity Denial & [facility] Review  Phone: [phone]  Fax: [phone]  Email: [email or web address]

## 2022-10-17 NOTE — PROGRESS NOTES
Progress Note - General Surgery   Kenyetta  63 y o  female MRN: [id number]  Unit/Bed#: -01 Encounter: [id number]    Assessment:  Incomplete small bowel obstruction slowly resolving  Reducible ventral hernia    Plan:  Continue with clear liquid diet with toast and crackers  Encourage out of bed  Subjective/Objective     Subjective: The patient complains of some mild abdominal pain  She has no nausea or vomiting  She states she is passing flatus but no bowel movement  Objective:     Blood pressure 127/83, pulse 62, temperature 98 1 °F (36 7 °C), resp  rate 18, height 5' 3" (1 6 m), weight 68 9 kg (152 lb), SpO2 94 %  ,Body mass index is 26 93 kg/m²  Intake/Output Summary (Last 24 hours) at 10/17/2022 0800  Last data filed at 10/17/2022 0018  Gross per 24 hour   Intake 2494 75 ml   Output 700 ml   Net 1794 75 ml       Invasive Devices  Report    Peripheral Intravenous Line  Duration           Peripheral IV 10/14/22 Left;Proximal;Ventral (anterior) Forearm 2 days    Peripheral IV 10/14/22 Right Forearm 2 days                Physical Exam:  Abdomen:  Soft and nontender  There is a low midline ventral hernia that is easily reducible      Lab, Imaging and other studies:  CBC:   Lab Results   Component Value Date    WBC 5 77 10/17/2022    HGB 9 9 (L) 10/17/2022    HCT 30 9 (L) 10/17/2022    MCV 93 10/17/2022     10/17/2022    MCH 29 8 10/17/2022    MCHC 32 0 10/17/2022    RDW 15 3 (H) 10/17/2022    MPV 9 6 10/17/2022    NRBC 0 10/17/2022

## 2022-10-17 NOTE — ASSESSMENT & PLAN NOTE
· UA on admission is positive  · Switch cefepime to ceftriaxone, anticipate switching patient to p o   Antibiotic On discharge  · No cultures sent as UA done in ED did not reflex to cultures - urine sample thrown out

## 2022-10-18 LAB
ANION GAP SERPL CALCULATED.3IONS-SCNC: 8 MMOL/L (ref 4–13)
BASOPHILS # BLD AUTO: 0.04 THOUSANDS/ΜL (ref 0–0.1)
BASOPHILS NFR BLD AUTO: 1 % (ref 0–1)
BUN SERPL-MCNC: 11 MG/DL (ref 5–25)
CALCIUM SERPL-MCNC: 7.7 MG/DL (ref 8.3–10.1)
CHLORIDE SERPL-SCNC: 107 MMOL/L (ref 96–108)
CO2 SERPL-SCNC: 25 MMOL/L (ref 21–32)
CREAT SERPL-MCNC: 0.55 MG/DL (ref 0.6–1.3)
EOSINOPHIL # BLD AUTO: 0.09 THOUSAND/ΜL (ref 0–0.61)
EOSINOPHIL NFR BLD AUTO: 2 % (ref 0–6)
ERYTHROCYTE [DISTWIDTH] IN BLOOD BY AUTOMATED COUNT: 15.2 % (ref 11.6–15.1)
GFR SERPL CREATININE-BSD FRML MDRD: 102 ML/MIN/1.73SQ M
GLUCOSE SERPL-MCNC: 85 MG/DL (ref 65–140)
HCT VFR BLD AUTO: 32.2 % (ref 34.8–46.1)
HGB BLD-MCNC: 10.2 G/DL (ref 11.5–15.4)
IMM GRANULOCYTES # BLD AUTO: 0.05 THOUSAND/UL (ref 0–0.2)
IMM GRANULOCYTES NFR BLD AUTO: 1 % (ref 0–2)
LYMPHOCYTES # BLD AUTO: 1.67 THOUSANDS/ΜL (ref 0.6–4.47)
LYMPHOCYTES NFR BLD AUTO: 27 % (ref 14–44)
MAGNESIUM SERPL-MCNC: 1.8 MG/DL (ref 1.6–2.6)
MCH RBC QN AUTO: 30 PG (ref 26.8–34.3)
MCHC RBC AUTO-ENTMCNC: 31.7 G/DL (ref 31.4–37.4)
MCV RBC AUTO: 95 FL (ref 82–98)
MONOCYTES # BLD AUTO: 0.85 THOUSAND/ΜL (ref 0.17–1.22)
MONOCYTES NFR BLD AUTO: 14 % (ref 4–12)
NEUTROPHILS # BLD AUTO: 3.41 THOUSANDS/ΜL (ref 1.85–7.62)
NEUTS SEG NFR BLD AUTO: 55 % (ref 43–75)
NRBC BLD AUTO-RTO: 0 /100 WBCS
PLATELET # BLD AUTO: 353 THOUSANDS/UL (ref 149–390)
PMV BLD AUTO: 9.9 FL (ref 8.9–12.7)
POTASSIUM SERPL-SCNC: 3.9 MMOL/L (ref 3.5–5.3)
RBC # BLD AUTO: 3.4 MILLION/UL (ref 3.81–5.12)
SODIUM SERPL-SCNC: 140 MMOL/L (ref 135–147)
WBC # BLD AUTO: 6.11 THOUSAND/UL (ref 4.31–10.16)

## 2022-10-18 PROCEDURE — 83735 ASSAY OF MAGNESIUM: CPT | Performed by: STUDENT IN AN ORGANIZED HEALTH CARE EDUCATION/TRAINING PROGRAM

## 2022-10-18 PROCEDURE — 85025 COMPLETE CBC W/AUTO DIFF WBC: CPT | Performed by: STUDENT IN AN ORGANIZED HEALTH CARE EDUCATION/TRAINING PROGRAM

## 2022-10-18 PROCEDURE — 99232 SBSQ HOSP IP/OBS MODERATE 35: CPT | Performed by: FAMILY MEDICINE

## 2022-10-18 PROCEDURE — NC001 PR NO CHARGE: Performed by: PHYSICIAN ASSISTANT

## 2022-10-18 PROCEDURE — 80048 BASIC METABOLIC PNL TOTAL CA: CPT | Performed by: STUDENT IN AN ORGANIZED HEALTH CARE EDUCATION/TRAINING PROGRAM

## 2022-10-18 RX ORDER — BUPRENORPHINE AND NALOXONE 8; 2 MG/1; MG/1
8 FILM, SOLUBLE BUCCAL; SUBLINGUAL DAILY
Status: DISCONTINUED | OUTPATIENT
Start: 2022-10-19 | End: 2022-10-19 | Stop reason: HOSPADM

## 2022-10-18 RX ORDER — HYDROXYCHLOROQUINE SULFATE 200 MG/1
200 TABLET, FILM COATED ORAL
Status: DISCONTINUED | OUTPATIENT
Start: 2022-10-19 | End: 2022-10-19 | Stop reason: HOSPADM

## 2022-10-18 RX ORDER — CEPHALEXIN 500 MG/1
500 CAPSULE ORAL EVERY 12 HOURS SCHEDULED
Status: DISCONTINUED | OUTPATIENT
Start: 2022-10-18 | End: 2022-10-19 | Stop reason: HOSPADM

## 2022-10-18 RX ORDER — BISACODYL 10 MG
10 SUPPOSITORY, RECTAL RECTAL DAILY
Status: DISCONTINUED | OUTPATIENT
Start: 2022-10-18 | End: 2022-10-19 | Stop reason: HOSPADM

## 2022-10-18 RX ORDER — POLYETHYLENE GLYCOL 3350 17 G/17G
17 POWDER, FOR SOLUTION ORAL ONCE
Status: COMPLETED | OUTPATIENT
Start: 2022-10-18 | End: 2022-10-18

## 2022-10-18 RX ORDER — DOCUSATE SODIUM 100 MG/1
100 CAPSULE, LIQUID FILLED ORAL 2 TIMES DAILY
Status: DISCONTINUED | OUTPATIENT
Start: 2022-10-18 | End: 2022-10-19 | Stop reason: HOSPADM

## 2022-10-18 RX ORDER — ENOXAPARIN SODIUM 100 MG/ML
40 INJECTION SUBCUTANEOUS
Status: DISCONTINUED | OUTPATIENT
Start: 2022-10-19 | End: 2022-10-19 | Stop reason: HOSPADM

## 2022-10-18 RX ORDER — BUPRENORPHINE HYDROCHLORIDE AND NALOXONE HYDROCHLORIDE DIHYDRATE 8; 2 MG/1; MG/1
1 TABLET SUBLINGUAL DAILY
COMMUNITY

## 2022-10-18 RX ORDER — POLYETHYLENE GLYCOL 3350 17 G/17G
17 POWDER, FOR SOLUTION ORAL DAILY
Status: DISCONTINUED | OUTPATIENT
Start: 2022-10-18 | End: 2022-10-18

## 2022-10-18 RX ADMIN — ACETAMINOPHEN 650 MG: 325 TABLET ORAL at 19:43

## 2022-10-18 RX ADMIN — DOCUSATE SODIUM 100 MG: 100 CAPSULE ORAL at 17:00

## 2022-10-18 RX ADMIN — BISACODYL 10 MG: 10 SUPPOSITORY RECTAL at 09:16

## 2022-10-18 RX ADMIN — GABAPENTIN 600 MG: 300 CAPSULE ORAL at 21:26

## 2022-10-18 RX ADMIN — HYDROXYCHLOROQUINE SULFATE 200 MG: 200 TABLET, FILM COATED ORAL at 09:16

## 2022-10-18 RX ADMIN — CEPHALEXIN 500 MG: 500 CAPSULE ORAL at 11:38

## 2022-10-18 RX ADMIN — POLYETHYLENE GLYCOL 3350 17 G: 17 POWDER, FOR SOLUTION ORAL at 09:16

## 2022-10-18 RX ADMIN — CALCIUM CARBONATE-VITAMIN D TAB 500 MG-200 UNIT 1 TABLET: 500-200 TAB at 17:00

## 2022-10-18 RX ADMIN — GABAPENTIN 600 MG: 300 CAPSULE ORAL at 05:34

## 2022-10-18 RX ADMIN — DEXTROSE AND SODIUM CHLORIDE 75 ML/HR: 5; .9 INJECTION, SOLUTION INTRAVENOUS at 01:28

## 2022-10-18 RX ADMIN — HEPARIN SODIUM 5000 UNITS: 5000 INJECTION INTRAVENOUS; SUBCUTANEOUS at 05:34

## 2022-10-18 RX ADMIN — DOCUSATE SODIUM 100 MG: 100 CAPSULE ORAL at 09:15

## 2022-10-18 RX ADMIN — HEPARIN SODIUM 5000 UNITS: 5000 INJECTION INTRAVENOUS; SUBCUTANEOUS at 13:32

## 2022-10-18 RX ADMIN — AZATHIOPRINE 150 MG: 50 TABLET ORAL at 09:14

## 2022-10-18 RX ADMIN — PREDNISONE 10 MG: 10 TABLET ORAL at 09:15

## 2022-10-18 RX ADMIN — GABAPENTIN 600 MG: 300 CAPSULE ORAL at 13:31

## 2022-10-18 RX ADMIN — CEPHALEXIN 500 MG: 500 CAPSULE ORAL at 21:26

## 2022-10-18 NOTE — ASSESSMENT & PLAN NOTE
· General surgery following and recommends conservative management for now  · Ventral hernia is reducible  · Diet advanced to clear liquid diet with toast and crackers - continues to tolerate gradual advancement in diet  · Zofran 4 mg IV p r n   For nausea  · Pain management  · Serial abdominal exams  · Patient has started to pass gas, no bowel movements yet however she does feel like she has to go  · Place on Dulcolax Colace and MiraLax as patient appears to have large stool burden on imaging

## 2022-10-18 NOTE — PROGRESS NOTES
Progress Note - General Surgery   [facility] 63 y o  female MRN: [id number]  Unit/Bed#: -01 Encounter: [id number]    Assessment:  Incomplete SBO  Tolerating clears with T&C      Plan:  Continue clears with T&C  Mirilax this AM, then daily PRN  OOB with assist    Subjective/Objective     Subjective: No acute events  Remains NPO  No n/v  passing gas, no BM  Objective:  Blood pressure 128/75, pulse 57, temperature 98 1 °F (36 7 °C), resp  rate 18, height 5' 3" (1 6 m), weight 70 7 kg (155 lb 12 8 oz), SpO2 95 %  ,Body mass index is 27 6 kg/m²  Intake/Output Summary (Last 24 hours) at 10/18/2022 0851  Last data filed at 10/18/2022 1408  Gross per 24 hour   Intake 1990 ml   Output 1400 ml   Net 590 ml       Invasive Devices  Report    Peripheral Intravenous Line  Duration           Peripheral IV 10/18/22 Left;Ventral (anterior) Forearm <1 day                Physical Exam:   Gen: AxOx3, non distressed  Abd: soft, bowel sounds present but hypoactive, reducible hernia present, non tender and non distended    Lab, Imaging and other studies:  I have personally reviewed pertinent lab results      CBC:   Lab Results   Component Value Date    WBC 6 11 10/18/2022    HGB 10 2 (L) 10/18/2022    HCT 32 2 (L) 10/18/2022    MCV 95 10/18/2022     10/18/2022    MCH 30 0 10/18/2022    MCHC 31 7 10/18/2022    RDW 15 2 (H) 10/18/2022    MPV 9 9 10/18/2022    NRBC 0 10/18/2022     CMP:   Lab Results   Component Value Date    SODIUM 140 10/18/2022    K 3 9 10/18/2022     10/18/2022    CO2 25 10/18/2022    BUN 11 10/18/2022    CREATININE 0 55 (L) 10/18/2022    CALCIUM 7 7 (L) 10/18/2022    EGFR 102 10/18/2022     VTE Pharmacologic Prophylaxis: Heparin  VTE Mechanical Prophylaxis: sequential compression device     [facility]

## 2022-10-18 NOTE — PLAN OF CARE
Problem: MOBILITY - ADULT  Goal: Maintain or return to baseline ADL function  Description: INTERVENTIONS:  -  Assess patient's ability to carry out ADLs; assess patient's baseline for ADL function and identify physical deficits which impact ability to perform ADLs (bathing, care of mouth/teeth, toileting, grooming, dressing, etc )  - Assess/evaluate cause of self-care deficits   - Assess range of motion  - Assess patient's mobility; develop plan if impaired  - Assess patient's need for assistive devices and provide as appropriate  - Encourage maximum independence but intervene and supervise when necessary  - Involve family in performance of ADLs  - Assess for home care needs following discharge   - Consider OT consult to assist with ADL evaluation and planning for discharge  - Provide patient education as appropriate  Outcome: Progressing     Problem: GASTROINTESTINAL - ADULT  Goal: Maintains or returns to baseline bowel function  Description: INTERVENTIONS:  - Assess bowel function  - Encourage oral fluids to ensure adequate hydration  - Administer IV fluids if ordered to ensure adequate hydration  - Administer ordered medications as needed  - Encourage mobilization and activity  - Consider nutritional services referral to assist patient with adequate nutrition and appropriate food choices  Outcome: Progressing

## 2022-10-18 NOTE — PLAN OF CARE
Problem: MOBILITY - ADULT  Goal: Maintain or return to baseline ADL function  Description: INTERVENTIONS:  -  Assess patient's ability to carry out ADLs; assess patient's baseline for ADL function and identify physical deficits which impact ability to perform ADLs (bathing, care of mouth/teeth, toileting, grooming, dressing, etc )  - Assess/evaluate cause of self-care deficits   - Assess range of motion  - Assess patient's mobility; develop plan if impaired  - Assess patient's need for assistive devices and provide as appropriate  - Encourage maximum independence but intervene and supervise when necessary  - Involve family in performance of ADLs  - Assess for home care needs following discharge   - Consider OT consult to assist with ADL evaluation and planning for discharge  - Provide patient education as appropriate  Outcome: Progressing  Goal: Maintains/Returns to pre admission functional level  Description: INTERVENTIONS:  - Perform BMAT or MOVE assessment daily    - Set and communicate daily mobility goal to care team and patient/family/caregiver     - Collaborate with rehabilitation services on mobility goals if consulted    -- Ambulate patient 3 times a day  - Out of bed to chair 3 times a day   - Out of bed for meals 3 times a day  - Out of bed for toileting  - Record patient progress and toleration of activity level   Outcome: Progressing

## 2022-10-18 NOTE — ASSESSMENT & PLAN NOTE
· UA on admission is positive  · Switch cefepime to ceftriaxone, anticipate switching patient to p o   Antibiotic today

## 2022-10-18 NOTE — PROGRESS NOTES
[address]  Progress Note Bailey Benavidez 1960, 63 y o  female MRN: [id number]  Unit/Bed#: -01 Encounter: [id number]  Primary Care Provider: MANDY Posey   Date and time admitted to hospital: 10/14/2022  2:38 PM    * SBO (small bowel obstruction) ([facility] )  Assessment & Plan  · General surgery following and recommends conservative management for now  · Ventral hernia is reducible  · Diet advanced to clear liquid diet with toast and crackers - continues to tolerate gradual advancement in diet  · Zofran 4 mg IV p r n  For nausea  · Pain management  · Serial abdominal exams  · Patient has started to pass gas, no bowel movements yet however she does feel like she has to go  · Place on Dulcolax Colace and MiraLax as patient appears to have large stool burden on imaging      Acute cystitis without hematuria  Assessment & Plan  · UA on admission is positive  · Switch cefepime to ceftriaxone, anticipate switching patient to p o  Antibiotic today    RA (rheumatoid arthritis) ([facility])  Assessment & Plan  · See plan under lupus    Sjogren's syndrome ([facility] )  Assessment & Plan  · See plan under lupus    Lupus ([facility])  Assessment & Plan  · Continue Plaquenil, prednisone and Imuran  · Continue gabapentin for discomfort  · Due to autoimmune disorders, patient takes Suboxone for pain management       Abdominal wall hernia  Assessment & Plan  · Reducible ventral wall hernia with no pain on reduction    Gastroesophageal reflux disease without esophagitis  Assessment & Plan  · Continue IV PPI    Essential hypertension  Assessment & Plan  · Patient's BP low normal   Hold home dose of losartan  · Continue to monitor BP      VTE Pharmacologic Prophylaxis:   Pharmacologic: Heparin  Mechanical VTE Prophylaxis in Place: Yes    Patient Centered Rounds: I have performed bedside rounds with nursing staff today      Discussions with Specialists or Other Care Team Provider:  Discussed with surgery    Education and Discussions with Family / Patient:  Discussed with patient at bedside will update family    Time Spent for Care: 30 minutes  More than 50% of total time spent on counseling and coordination of care as described above  Current Length of Stay: 4 day(s)    Current Patient Status: Inpatient   Certification Statement: The patient will continue to require additional inpatient hospital stay due to Partial small-bowel obstruction    Discharge Plan:  Anticipate discharge in 24 hours if patient does have a bowel movement today    Code Status: Level 1 - Full Code      Subjective:   Patient denies any chest pain or shortness of breath or abdominal pain  She states she is feeling better feels like she has to have a bowel movement passing gas but no bowel movement yet  She states she probably has not had a bowel movement in a week    Objective:     Vitals:   Temp (24hrs), Av 2 °F (36 8 °C), Min:97 9 °F (36 6 °C), Max:98 6 °F (37 °C)    Temp:  [97 9 °F (36 6 °C)-98 6 °F (37 °C)] 98 1 °F (36 7 °C)  HR:  [57-68] 57  Resp:  [18] 18  BP: (119-128)/(75-79) 128/75  SpO2:  [92 %-95 %] 95 %  Body mass index is 27 6 kg/m²  Input and Output Summary (last 24 hours): Intake/Output Summary (Last 24 hours) at 10/18/2022 1102  Last data filed at 10/18/2022 1001  Gross per 24 hour   Intake 1780 ml   Output 1500 ml   Net 280 ml       Physical Exam:     Physical Exam  Vitals and nursing note reviewed  Constitutional:       Appearance: Normal appearance  HENT:      Head: Normocephalic and atraumatic  Right Ear: External ear normal       Left Ear: External ear normal       Nose: Nose normal       Mouth/Throat:      Pharynx: Oropharynx is clear  Eyes:      Pupils: Pupils are equal, round, and reactive to light  Cardiovascular:      Rate and Rhythm: Normal rate and regular rhythm  Heart sounds: Normal heart sounds     Pulmonary:      Effort: Pulmonary effort is normal       Breath sounds: Normal breath sounds  Abdominal:      General: Bowel sounds are normal  There is distension  Palpations: Abdomen is soft  Tenderness: There is abdominal tenderness  Hernia: A hernia is present  Musculoskeletal:         General: Normal range of motion  Cervical back: Normal range of motion and neck supple  Skin:     General: Skin is warm and dry  Capillary Refill: Capillary refill takes less than 2 seconds  Neurological:      General: No focal deficit present  Mental Status: She is alert and oriented to person, place, and time  Psychiatric:         Mood and Affect: Mood normal            Additional Data:     Labs:    Results from last 7 days   Lab Units 10/18/22  0634   WBC Thousand/uL 6 11   HEMOGLOBIN g/dL 10 2*   HEMATOCRIT % 32 2*   PLATELETS Thousands/uL 353   NEUTROS PCT % 55   LYMPHS PCT % 27   MONOS PCT % 14*   EOS PCT % 2     Results from last 7 days   Lab Units 10/18/22  0634 10/17/22  0502   SODIUM mmol/L 140 138   POTASSIUM mmol/L 3 9 3 7   CHLORIDE mmol/L 107 105   CO2 mmol/L 25 30   BUN mg/dL 11 14   CREATININE mg/dL 0 55* 0 58*   ANION GAP mmol/L 8 3*   CALCIUM mg/dL 7 7* 7 6*   ALBUMIN g/dL  --  1 9*   TOTAL BILIRUBIN mg/dL  --  0 46   ALK PHOS U/L  --  70   ALT U/L  --  16   AST U/L  --  13   GLUCOSE RANDOM mg/dL 85 95     Results from last 7 days   Lab Units 10/14/22  2150   INR  1 05             Results from last 7 days   Lab Units 10/14/22  2150 10/14/22  1509   LACTIC ACID mmol/L  --  1 7   PROCALCITONIN ng/ml 0 18  --            * I Have Reviewed All Lab Data Listed Above  * Additional Pertinent Lab Tests Reviewed: [address] Admission Reviewed    Imaging:    Imaging Reports Reviewed Today Include:  CT abdomen pelvis  Imaging Personally Reviewed by Myself Includes:  CT abdomen pelvis    Recent Cultures (last 7 days):     Results from last 7 days   Lab Units 10/14/22  1507 10/14/22  1503   BLOOD CULTURE  No Growth at 72 hrs   No Growth at 72 hrs  Last 24 Hours Medication List:   Current Facility-Administered Medications   Medication Dose Route Frequency Provider Last Rate   • acetaminophen  650 mg Oral Q6H PRN Nina Hayward PA-C     • albuterol  2 puff Inhalation Q6H PRN Nina Hayward PA-C     • azaTHIOprine  150 mg Oral Daily Nina Hayward PA-C     • bisacodyl  10 mg Rectal Daily Mellissa Dacosta MD     • cefTRIAXone  1,000 mg Intravenous Q24H Colon Given Renulipascual Calhoun MD 1,000 mg (10/17/22 2041)   • docusate sodium  100 mg Oral BID Mellissa Dacosta MD     • gabapentin  600 mg Oral Q8H Nina Hayward PA-C     • heparin (porcine)  5,000 Units Subcutaneous [facility] Rachel Calhoun MD     • hydroxychloroquine  200 mg Oral BID Nina Hayward PA-C     • influenza vaccine  0 5 mL Intramuscular Prior to discharge Korey Salazar MD     • ondansetron  4 mg Intravenous Q6H PRN Nina Hayward PA-C     • predniSONE  10 mg Oral Daily Nina Hayward PA-C          Today, Patient Was Seen By: Mellissa Dacosta    ** Please Note: Dictation voice to text software may have been used in the creation of this document   **

## 2022-10-18 NOTE — NURSING NOTE
Pt noted to have increased swelling to BLLE and weight gain of 3 8lbs x1 day  On call SLIM aware  D/C IVF

## 2022-10-18 NOTE — NURSING NOTE
MD wanted to know what dose of Saboxon patient takes, asked the patient and patient states she has been taking her Saboxon from home while here inpatient  The home meds sent to pharmacy, patient is instructed not to take home meds

## 2022-10-19 ENCOUNTER — APPOINTMENT (INPATIENT)
Dept: RADIOLOGY | Facility: HOSPITAL | Age: 62
DRG: 394 | End: 2022-10-19
Payer: COMMERCIAL

## 2022-10-19 VITALS
HEIGHT: 63 IN | SYSTOLIC BLOOD PRESSURE: 123 MMHG | RESPIRATION RATE: 18 BRPM | BODY MASS INDEX: 27.71 KG/M2 | HEART RATE: 88 BPM | TEMPERATURE: 98.1 F | WEIGHT: 156.4 LBS | OXYGEN SATURATION: 93 % | DIASTOLIC BLOOD PRESSURE: 85 MMHG

## 2022-10-19 LAB
BACTERIA BLD CULT: NORMAL
BACTERIA BLD CULT: NORMAL

## 2022-10-19 PROCEDURE — 74022 RADEX COMPL AQT ABD SERIES: CPT

## 2022-10-19 PROCEDURE — 99239 HOSP IP/OBS DSCHRG MGMT >30: CPT | Performed by: FAMILY MEDICINE

## 2022-10-19 RX ORDER — POTASSIUM CHLORIDE 20 MEQ/1
20 TABLET, EXTENDED RELEASE ORAL ONCE
Status: COMPLETED | OUTPATIENT
Start: 2022-10-19 | End: 2022-10-19

## 2022-10-19 RX ORDER — POLYETHYLENE GLYCOL 3350 17 G/17G
17 POWDER, FOR SOLUTION ORAL DAILY
Qty: 510 G | Refills: 0 | Status: SHIPPED | OUTPATIENT
Start: 2022-10-19 | End: 2022-11-18

## 2022-10-19 RX ORDER — POTASSIUM CHLORIDE 1.5 G/1.77G
20 POWDER, FOR SOLUTION ORAL DAILY
Qty: 10 PACKET | Refills: 0 | Status: SHIPPED | OUTPATIENT
Start: 2022-10-19 | End: 2022-10-29

## 2022-10-19 RX ORDER — FUROSEMIDE 10 MG/ML
40 INJECTION INTRAMUSCULAR; INTRAVENOUS ONCE
Status: COMPLETED | OUTPATIENT
Start: 2022-10-19 | End: 2022-10-19

## 2022-10-19 RX ORDER — POLYETHYLENE GLYCOL 3350 17 G/17G
17 POWDER, FOR SOLUTION ORAL ONCE
Status: COMPLETED | OUTPATIENT
Start: 2022-10-19 | End: 2022-10-19

## 2022-10-19 RX ORDER — DOCUSATE SODIUM 100 MG/1
100 CAPSULE, LIQUID FILLED ORAL 2 TIMES DAILY
Qty: 60 CAPSULE | Refills: 0 | Status: SHIPPED | OUTPATIENT
Start: 2022-10-19 | End: 2022-11-18

## 2022-10-19 RX ORDER — CALCIUM CARBONATE 300MG(750)
400 TABLET,CHEWABLE ORAL DAILY
Qty: 30 TABLET | Refills: 0 | Status: SHIPPED | OUTPATIENT
Start: 2022-10-19 | End: 2022-11-18

## 2022-10-19 RX ORDER — CEPHALEXIN 500 MG/1
500 CAPSULE ORAL EVERY 12 HOURS SCHEDULED
Qty: 3 CAPSULE | Refills: 0 | Status: SHIPPED | OUTPATIENT
Start: 2022-10-19 | End: 2022-10-21

## 2022-10-19 RX ADMIN — PREDNISONE 10 MG: 10 TABLET ORAL at 08:03

## 2022-10-19 RX ADMIN — CALCIUM CARBONATE-VITAMIN D TAB 500 MG-200 UNIT 1 TABLET: 500-200 TAB at 07:58

## 2022-10-19 RX ADMIN — Medication 400 MG: at 09:20

## 2022-10-19 RX ADMIN — HYDROXYCHLOROQUINE SULFATE 200 MG: 200 TABLET, FILM COATED ORAL at 08:01

## 2022-10-19 RX ADMIN — DOCUSATE SODIUM 100 MG: 100 CAPSULE ORAL at 17:46

## 2022-10-19 RX ADMIN — FUROSEMIDE 40 MG: 10 INJECTION, SOLUTION INTRAMUSCULAR; INTRAVENOUS at 09:20

## 2022-10-19 RX ADMIN — POTASSIUM CHLORIDE 20 MEQ: 1500 TABLET, EXTENDED RELEASE ORAL at 09:20

## 2022-10-19 RX ADMIN — ENOXAPARIN SODIUM 40 MG: 40 INJECTION SUBCUTANEOUS at 08:04

## 2022-10-19 RX ADMIN — BUPRENORPHINE AND NALOXONE 8 MG: 8; 2 FILM BUCCAL; SUBLINGUAL at 08:02

## 2022-10-19 RX ADMIN — CALCIUM CARBONATE-VITAMIN D TAB 500 MG-200 UNIT 1 TABLET: 500-200 TAB at 16:53

## 2022-10-19 RX ADMIN — CEPHALEXIN 500 MG: 500 CAPSULE ORAL at 08:04

## 2022-10-19 RX ADMIN — GABAPENTIN 600 MG: 300 CAPSULE ORAL at 05:02

## 2022-10-19 RX ADMIN — DOCUSATE SODIUM 100 MG: 100 CAPSULE ORAL at 08:04

## 2022-10-19 RX ADMIN — BISACODYL 10 MG: 10 SUPPOSITORY RECTAL at 08:04

## 2022-10-19 RX ADMIN — AZATHIOPRINE 150 MG: 50 TABLET ORAL at 08:03

## 2022-10-19 RX ADMIN — POLYETHYLENE GLYCOL 3350 17 G: 17 POWDER, FOR SOLUTION ORAL at 09:20

## 2022-10-19 NOTE — ASSESSMENT & PLAN NOTE
· General surgery following and recommends conservative management for now  · Ventral hernia is reducible  · Diet advanced to soft diet  · Zofran 4 mg IV p r n  For nausea  · Place on Colace and MiraLax as patient appears to have large stool burden on imaging  · Patient has had 3 bowel movement so far    Will continue bowel regimen for now and try to have at least 1or 2 more bowel movements

## 2022-10-19 NOTE — NURSING NOTE
Patient given and explained discharge education on potassium, keflex, miralax, colace, and magnesium  Patient verbalized understanding

## 2022-10-19 NOTE — PLAN OF CARE
Problem: MOBILITY - ADULT  Goal: Maintain or return to baseline ADL function  Description: INTERVENTIONS:  -  Assess patient's ability to carry out ADLs; assess patient's baseline for ADL function and identify physical deficits which impact ability to perform ADLs (bathing, care of mouth/teeth, toileting, grooming, dressing, etc )  - Assess/evaluate cause of self-care deficits   - Assess range of motion  - Assess patient's mobility; develop plan if impaired  - Assess patient's need for assistive devices and provide as appropriate  - Encourage maximum independence but intervene and supervise when necessary  - Involve family in performance of ADLs  - Assess for home care needs following discharge   - Consider OT consult to assist with ADL evaluation and planning for discharge  - Provide patient education as appropriate  10/19/2022 1025 by Adriane Boothe RN  Outcome: Progressing  10/19/2022 1024 by Adriane Boothe RN  Outcome: Progressing  Goal: Maintains/Returns to pre admission functional level  Description: INTERVENTIONS:  - Perform BMAT or MOVE assessment daily    - Set and communicate daily mobility goal to care team and patient/family/caregiver     - Collaborate with rehabilitation services on mobility goals if consulted  - Stand patient 3 times a day  - Ambulate patient 5 times a day  - Out of bed to chair 3 times a day   - Out of bed for meals 3 times a day  - Out of bed for toileting  - Record patient progress and toleration of activity level   10/19/2022 1025 by Adriane Boothe RN  Outcome: Progressing  10/19/2022 1024 by Adriane Boothe RN  Outcome: Progressing

## 2022-10-19 NOTE — DISCHARGE SUMMARY
[address]  Discharge- Lakisha Robb 1960, 63 y o  female MRN: [id number]  Unit/Bed#: -01 Encounter: [id number]  Primary Care Provider: MANDY Parra   Date and time admitted to hospital: 10/14/2022  2:38 PM    * SBO (small bowel obstruction) ([address] )  Assessment & Plan  · General surgery following and recommends conservative management for now  · Ventral hernia is reducible  · Diet advanced to soft diet  · Zofran 4 mg IV p r n  For nausea  · Place on Colace and MiraLax as patient appears to have large stool burden on imaging  · Patient has had 3 bowel movement so far    Will continue bowel regimen for now and try to have at least 1or 2 more bowel movements      Acute cystitis without hematuria  Assessment & Plan  · UA on admission is positive  · Know on oral Keflex to complete total 7 day course    Sjogren's syndrome (HCC)  Assessment & Plan  · See plan under lupus    Lupus (HCC)  Assessment & Plan  · Continue Plaquenil, prednisone and Imuran  · Continue gabapentin for discomfort  · Due to autoimmune disorders, patient takes Suboxone for pain management       Abdominal wall hernia  Assessment & Plan  · Reducible ventral wall hernia with no pain on reduction    Gastroesophageal reflux disease without esophagitis  Assessment & Plan  · Continue IV PPI    Essential hypertension  Assessment & Plan  · Patient's BP low normal   Hold home dose of losartan  · Continue to monitor BP      Discharging Physician / Practitioner: Sofia January  PCP: Camden Parra [facility]  Admission Date:   Admission Orders (From admission, onward)     Ordered        10/14/22 2011  INPATIENT ADMISSION  Once                      Discharge Date: 10/19/22    Medical Problems             Resolved Problems  Date Reviewed: 10/19/2022          Resolved    Laceration of liver 10/15/2022     Resolved by  Desi Black MD    Overview Signed 10/14/2022  9:06 PM by Galen Fermin PA-C REPAIRED 1977         Long term (current) use of systemic steroids 10/15/2022     Resolved by  Moo Calhoun MD    Deep vein thrombosis (DVT) of lower extremity ([address] ) 10/16/2022     Resolved by  Asia Olivarez MD                Consultations During Hospital Stay:  · General surgery    Procedures Performed:   · none    Significant Findings / Test Results:   CT chest abdomen pelvis w contrast    Result Date: 10/14/2022  Impression: Ventral abdominal wall hernia containing a short segment loop of small bowel with intra-abdominal adjacent short segment small bowel dilation  There are additional prominent caliber small bowel loops containing air-fluid levels  Findings are suspicious  for partial or low-grade obstruction  Additionally there are closely apposed bowel loops to the ventral abdominal wall which may be contributory  Large colonic stool burden  Subtle area of decreased attenuation in the right hepatic lobe laterally may represent focal fatty infiltration  Comparison with prior imaging recommended  Additional findings as above  The study was marked in [facility] for immediate notification  Workstation performed: [id number]     Incidental Findings:   · none    Test Results Pending at Discharge (will require follow up):   · none     Outpatient Tests Requested:  · none    Complications:  none    Reason for Admission:  Abdominal pain    Hospital Course:     Esteban Matthew is a 63 y o  female patient who originally presented to the hospital on 10/14/2022 due to partial small-bowel obstruction along with acute cystitis without hematuria present admission  Patient treated with antibiotics bowel rest IV fluid hydration and then subsequently given some stool softeners and MiraLax and she started having bowel movements and is now feeling better  Initially received IV antibiotics and now transition to a course of oral antibiotics    Encourage to take stool softeners and increase fiber in her diet to prevent hard stools and constipation outpatient  Follow-up with GI outpatient    Please see above list of diagnoses and related plan for additional information  Condition at Discharge: good     Discharge Day Visit / Exam:     Subjective:  Patient denies any chest pain or shortness of breath or abdominal pain  No nausea vomiting reported  Had 3 bowel movement so far since yesterday  Vitals: Blood Pressure: 129/92 (10/19/22 0800)  Pulse: 92 (10/19/22 0800)  Temperature: 97 9 °F (36 6 °C) (10/19/22 0800)  Temp Source: Temporal (10/16/22 1423)  Respirations: 19 (10/19/22 0800)  Height: 5' 3" (160 cm) (10/17/22 0936)  Weight - Scale: 70 9 kg (156 lb 6 4 oz) (10/19/22 0531)  SpO2: 93 % (10/19/22 0800)  Exam:   Physical Exam  Vitals and nursing note reviewed  Constitutional:       Appearance: Normal appearance  HENT:      Head: Normocephalic and atraumatic  Right Ear: External ear normal       Left Ear: External ear normal       Nose: Nose normal       Mouth/Throat:      Pharynx: Oropharynx is clear  Eyes:      Pupils: Pupils are equal, round, and reactive to light  Cardiovascular:      Rate and Rhythm: Normal rate and regular rhythm  Heart sounds: Normal heart sounds  Pulmonary:      Effort: Pulmonary effort is normal       Breath sounds: Normal breath sounds  Abdominal:      General: Bowel sounds are normal       Palpations: Abdomen is soft  Tenderness: There is no abdominal tenderness  Musculoskeletal:         General: Normal range of motion  Cervical back: Normal range of motion and neck supple  Skin:     General: Skin is warm and dry  Capillary Refill: Capillary refill takes less than 2 seconds  Neurological:      General: No focal deficit present  Mental Status: She is alert and oriented to person, place, and time     Psychiatric:         Mood and Affect: Mood normal          Discussion with Family:  Updated     Discharge instructions/Information to patient and family:   See after visit summary for information provided to patient and family  Provisions for Follow-Up Care:  See after visit summary for information related to follow-up care and any pertinent home health orders  Disposition:     Home    For Discharges to [address] [facility]:   · Not Applicable to this Patient - Not Applicable to this Patient    Planned Readmission:  None     Discharge Statement:  I spent 35 minutes discharging the patient  This time was spent on the day of discharge  I had direct contact with the patient on the day of discharge  Greater than 50% of the total time was spent examining patient, answering all patient questions, arranging and discussing plan of care with patient as well as directly providing post-discharge instructions  Additional time then spent on discharge activities  Discharge Medications:  See after visit summary for reconciled discharge medications provided to patient and family        ** Please Note: This note has been constructed using a voice recognition system **

## 2022-10-19 NOTE — PROGRESS NOTES
Progress Note - General Surgery   Charlette Welch 63 y o  female MRN: [id number]  Unit/Bed#: -01 Encounter: [id number]    Assessment:  Incomplete small bowel obstruction resolving  Continue with Colace and suppository to help stimulate a bowel movement  Plan:  As noted above will continue with Colace and suppository to stimulate bowel movement  Subjective/Objective     Subjective: The patient denies any nausea or vomiting  She is tolerating clear liquid with toast and crackers  She has passed flatus but no real bowel movement  Objective:     Blood pressure 129/92, pulse 92, temperature 97 9 °F (36 6 °C), resp  rate 19, height 5' 3" (1 6 m), weight 70 9 kg (156 lb 6 4 oz), SpO2 93 %  ,Body mass index is 27 71 kg/m²  Intake/Output Summary (Last 24 hours) at 10/19/2022 0829  Last data filed at 10/18/2022 1700  Gross per 24 hour   Intake 790 ml   Output 1750 ml   Net -960 ml       Invasive Devices  Report    Peripheral Intravenous Line  Duration           Peripheral IV 10/18/22 Left;Ventral (anterior) Forearm 1 day                Physical Exam:  Abdomen is soft with minimal tenderness in the left lower quadrant  Ventral hernia is readily reducible

## 2022-10-20 NOTE — UTILIZATION REVIEW
NOTIFICATION OF ADMISSION DISCHARGE   This is a Notification of Discharge from [address]  Please be advised that this patient has been discharge from our facility  Below you will find the admission and discharge date and time including the patient’s disposition  UTILIZATION REVIEW CONTACT:  [address]  Utilization   Network Utilization Review Department  Phone: [phone] x carefully listen to the prompts  All voicemails are confidential   Email: [email or web address]     ADMISSION INFORMATION  PRESENTATION DATE: 10/14/2022  2:38 PM  OBERVATION ADMISSION DATE:  INPATIENT ADMISSION DATE: 10/14/22  8:11 PM   DISCHARGE DATE: 10/19/2022  6:39 PM  DISPOSITION: Home/Self Care Home/Self Care      IMPORTANT INFORMATION:  Send all requests for admission clinical reviews, approved or denied determinations and any other requests to dedicated fax number below belonging to the campus where the patient is receiving treatment   List of dedicated fax numbers:  [address] DENIALS (Administrative/Medical Necessity) [phone]   [address] (Maternity/NICU/Pediatrics) [phone]   [facility] [phone]   [place] [address] [phone]   [address] [phone]   [address] [facility] [phone]   [address] [place] [address] [phone]   [address] [place] [address] 119 [phone]   [facility]  [phone]   [address] [facility] [phone]   [address] [facility] [address] [facility] [phone]

## 2022-10-21 LAB
ATRIAL RATE: 97 BPM
P AXIS: 59 DEGREES
PR INTERVAL: 208 MS
QRS AXIS: 43 DEGREES
QRSD INTERVAL: 104 MS
QT INTERVAL: 364 MS
QTC INTERVAL: 462 MS
T WAVE AXIS: 26 DEGREES
VENTRICULAR RATE: 97 BPM

## 2022-10-21 PROCEDURE — 93010 ELECTROCARDIOGRAM REPORT: CPT | Performed by: INTERNAL MEDICINE

## 2022-12-17 PROBLEM — N30.00 ACUTE CYSTITIS WITHOUT HEMATURIA: Status: RESOLVED | Noted: 2022-10-15 | Resolved: 2022-12-17

## 2024-08-18 ENCOUNTER — APPOINTMENT (INPATIENT)
Dept: CT IMAGING | Facility: HOSPITAL | Age: 64
DRG: 565 | End: 2024-08-18
Payer: COMMERCIAL

## 2024-08-18 ENCOUNTER — HOSPITAL ENCOUNTER (INPATIENT)
Facility: HOSPITAL | Age: 64
LOS: 3 days | Discharge: HOME | DRG: 565 | End: 2024-08-21
Attending: EMERGENCY MEDICINE | Admitting: FAMILY MEDICINE
Payer: COMMERCIAL

## 2024-08-18 DIAGNOSIS — M71.121 SEPTIC OLECRANON BURSITIS OF RIGHT ELBOW: ICD-10-CM

## 2024-08-18 DIAGNOSIS — M06.9 RHEUMATOID ARTHRITIS, INVOLVING UNSPECIFIED SITE, UNSPECIFIED WHETHER RHEUMATOID FACTOR PRESENT (HCC): ICD-10-CM

## 2024-08-18 DIAGNOSIS — M00.9 SEPTIC ARTHRITIS (HCC): ICD-10-CM

## 2024-08-18 DIAGNOSIS — L03.113 CELLULITIS OF RIGHT UPPER EXTREMITY: Primary | ICD-10-CM

## 2024-08-18 PROBLEM — L03.90 CELLULITIS: Status: ACTIVE | Noted: 2024-08-18

## 2024-08-18 LAB
ALBUMIN SERPL BCG-MCNC: 4.1 G/DL (ref 3.5–5)
ALP SERPL-CCNC: 70 U/L (ref 34–104)
ALT SERPL W P-5'-P-CCNC: 13 U/L (ref 7–52)
ANION GAP SERPL CALCULATED.3IONS-SCNC: 7 MMOL/L (ref 4–13)
AST SERPL W P-5'-P-CCNC: 14 U/L (ref 13–39)
BASOPHILS # BLD AUTO: 0.04 THOUSANDS/ÂΜL (ref 0–0.1)
BASOPHILS NFR BLD AUTO: 0 % (ref 0–1)
BILIRUB SERPL-MCNC: 1.32 MG/DL (ref 0.2–1)
BUN SERPL-MCNC: 13 MG/DL (ref 5–25)
CALCIUM SERPL-MCNC: 9.2 MG/DL (ref 8.4–10.2)
CHLORIDE SERPL-SCNC: 102 MMOL/L (ref 96–108)
CO2 SERPL-SCNC: 27 MMOL/L (ref 21–32)
CREAT SERPL-MCNC: 0.7 MG/DL (ref 0.6–1.3)
EOSINOPHIL # BLD AUTO: 0.1 THOUSAND/ÂΜL (ref 0–0.61)
EOSINOPHIL NFR BLD AUTO: 1 % (ref 0–6)
ERYTHROCYTE [DISTWIDTH] IN BLOOD BY AUTOMATED COUNT: 12.6 % (ref 11.6–15.1)
GFR SERPL CREATININE-BSD FRML MDRD: 93 ML/MIN/1.73SQ M
GLUCOSE SERPL-MCNC: 98 MG/DL (ref 65–140)
HCT VFR BLD AUTO: 39.1 % (ref 34.8–46.1)
HGB BLD-MCNC: 12.7 G/DL (ref 11.5–15.4)
IMM GRANULOCYTES # BLD AUTO: 0.07 THOUSAND/UL (ref 0–0.2)
IMM GRANULOCYTES NFR BLD AUTO: 1 % (ref 0–2)
LACTATE SERPL-SCNC: 1.1 MMOL/L (ref 0.5–2)
LYMPHOCYTES # BLD AUTO: 0.78 THOUSANDS/ÂΜL (ref 0.6–4.47)
LYMPHOCYTES NFR BLD AUTO: 7 % (ref 14–44)
MCH RBC QN AUTO: 30.2 PG (ref 26.8–34.3)
MCHC RBC AUTO-ENTMCNC: 32.5 G/DL (ref 31.4–37.4)
MCV RBC AUTO: 93 FL (ref 82–98)
MONOCYTES # BLD AUTO: 1.1 THOUSAND/ÂΜL (ref 0.17–1.22)
MONOCYTES NFR BLD AUTO: 9 % (ref 4–12)
NEUTROPHILS # BLD AUTO: 9.9 THOUSANDS/ÂΜL (ref 1.85–7.62)
NEUTS SEG NFR BLD AUTO: 82 % (ref 43–75)
NRBC BLD AUTO-RTO: 0 /100 WBCS
PLATELET # BLD AUTO: 276 THOUSANDS/UL (ref 149–390)
PMV BLD AUTO: 9.9 FL (ref 8.9–12.7)
POTASSIUM SERPL-SCNC: 3.6 MMOL/L (ref 3.5–5.3)
PROCALCITONIN SERPL-MCNC: <0.05 NG/ML
PROT SERPL-MCNC: 6.8 G/DL (ref 6.4–8.4)
RBC # BLD AUTO: 4.21 MILLION/UL (ref 3.81–5.12)
SODIUM SERPL-SCNC: 136 MMOL/L (ref 135–147)
WBC # BLD AUTO: 11.99 THOUSAND/UL (ref 4.31–10.16)

## 2024-08-18 PROCEDURE — 99285 EMERGENCY DEPT VISIT HI MDM: CPT | Performed by: EMERGENCY MEDICINE

## 2024-08-18 PROCEDURE — 36415 COLL VENOUS BLD VENIPUNCTURE: CPT | Performed by: PHYSICIAN ASSISTANT

## 2024-08-18 PROCEDURE — 99284 EMERGENCY DEPT VISIT MOD MDM: CPT

## 2024-08-18 PROCEDURE — 83605 ASSAY OF LACTIC ACID: CPT | Performed by: PHYSICIAN ASSISTANT

## 2024-08-18 PROCEDURE — 80053 COMPREHEN METABOLIC PANEL: CPT | Performed by: PHYSICIAN ASSISTANT

## 2024-08-18 PROCEDURE — 73201 CT UPPER EXTREMITY W/DYE: CPT

## 2024-08-18 PROCEDURE — 85025 COMPLETE CBC W/AUTO DIFF WBC: CPT | Performed by: PHYSICIAN ASSISTANT

## 2024-08-18 PROCEDURE — 87040 BLOOD CULTURE FOR BACTERIA: CPT | Performed by: PHYSICIAN ASSISTANT

## 2024-08-18 PROCEDURE — 99222 1ST HOSP IP/OBS MODERATE 55: CPT | Performed by: FAMILY MEDICINE

## 2024-08-18 PROCEDURE — 96365 THER/PROPH/DIAG IV INF INIT: CPT

## 2024-08-18 PROCEDURE — 84145 PROCALCITONIN (PCT): CPT | Performed by: PHYSICIAN ASSISTANT

## 2024-08-18 RX ORDER — CEFAZOLIN SODIUM 1 G/50ML
1000 SOLUTION INTRAVENOUS EVERY 8 HOURS
Status: DISCONTINUED | OUTPATIENT
Start: 2024-08-18 | End: 2024-08-21 | Stop reason: HOSPADM

## 2024-08-18 RX ORDER — NIFEDIPINE 30 MG
30 TABLET, EXTENDED RELEASE ORAL DAILY
COMMUNITY

## 2024-08-18 RX ORDER — ONDANSETRON 2 MG/ML
4 INJECTION INTRAMUSCULAR; INTRAVENOUS EVERY 6 HOURS PRN
Status: DISCONTINUED | OUTPATIENT
Start: 2024-08-18 | End: 2024-08-21 | Stop reason: HOSPADM

## 2024-08-18 RX ORDER — GABAPENTIN 300 MG/1
600 CAPSULE ORAL EVERY 8 HOURS
Status: DISCONTINUED | OUTPATIENT
Start: 2024-08-18 | End: 2024-08-21 | Stop reason: HOSPADM

## 2024-08-18 RX ORDER — BUPRENORPHINE AND NALOXONE 8; 2 MG/1; MG/1
8 FILM, SOLUBLE BUCCAL; SUBLINGUAL DAILY PRN
Status: DISCONTINUED | OUTPATIENT
Start: 2024-08-18 | End: 2024-08-21 | Stop reason: HOSPADM

## 2024-08-18 RX ORDER — RITUXIMAB 10 MG/ML
100 INJECTION, SOLUTION INTRAVENOUS
COMMUNITY

## 2024-08-18 RX ORDER — LOSARTAN POTASSIUM 50 MG/1
50 TABLET ORAL DAILY
Status: DISCONTINUED | OUTPATIENT
Start: 2024-08-19 | End: 2024-08-21 | Stop reason: HOSPADM

## 2024-08-18 RX ORDER — GABAPENTIN 600 MG/1
600 TABLET ORAL 3 TIMES DAILY
COMMUNITY

## 2024-08-18 RX ORDER — ENOXAPARIN SODIUM 100 MG/ML
40 INJECTION SUBCUTANEOUS DAILY
Status: DISCONTINUED | OUTPATIENT
Start: 2024-08-19 | End: 2024-08-18

## 2024-08-18 RX ORDER — KETOROLAC TROMETHAMINE 30 MG/ML
15 INJECTION, SOLUTION INTRAMUSCULAR; INTRAVENOUS EVERY 6 HOURS PRN
Status: DISPENSED | OUTPATIENT
Start: 2024-08-18 | End: 2024-08-20

## 2024-08-18 RX ORDER — CEFTRIAXONE 1 G/50ML
1000 INJECTION, SOLUTION INTRAVENOUS ONCE
Status: COMPLETED | OUTPATIENT
Start: 2024-08-18 | End: 2024-08-18

## 2024-08-18 RX ORDER — ACETAMINOPHEN 325 MG/1
650 TABLET ORAL EVERY 6 HOURS PRN
Status: DISCONTINUED | OUTPATIENT
Start: 2024-08-18 | End: 2024-08-21 | Stop reason: HOSPADM

## 2024-08-18 RX ADMIN — CEFAZOLIN SODIUM 1000 MG: 1 SOLUTION INTRAVENOUS at 20:27

## 2024-08-18 RX ADMIN — KETOROLAC TROMETHAMINE 15 MG: 30 INJECTION, SOLUTION INTRAMUSCULAR; INTRAVENOUS at 17:47

## 2024-08-18 RX ADMIN — ACETAMINOPHEN 650 MG: 325 TABLET ORAL at 16:19

## 2024-08-18 RX ADMIN — GABAPENTIN 600 MG: 300 CAPSULE ORAL at 16:19

## 2024-08-18 RX ADMIN — IOHEXOL 100 ML: 350 INJECTION, SOLUTION INTRAVENOUS at 17:19

## 2024-08-18 RX ADMIN — CEFTRIAXONE 1000 MG: 1 INJECTION, SOLUTION INTRAVENOUS at 14:18

## 2024-08-18 RX ADMIN — GABAPENTIN 600 MG: 300 CAPSULE ORAL at 23:42

## 2024-08-18 NOTE — ASSESSMENT & PLAN NOTE
BP mildly elevated at 156/96  Takes losartan and amlodipine in the outpatient setting  We will continue with BP meds, patient unsure of amlodipine dosing, will defer amlodipine at this time
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
## 2024-08-18 NOTE — ASSESSMENT & PLAN NOTE
Takes Plaquenil, prednisone, and Benlysta injection  Has recently been taking prednisone 30 mg instead of maintenance 5 mg because of a recent flareup  Benlysta injection Received in May, gets injection about every 4 months  Will hold Plaquenil, prednisone, and Benlysta secondary to cellulitic infection

## 2024-08-18 NOTE — QUICK NOTE
Imaging came back infected olecranon bursitis.  Also effusion and right elbow joint, unable to rule out infection.    Orthopedic consult placed, and reach out to on-call orthopedics regarding the consult-Per on-call orthopedics, patient will be seen in the morning by rounding surgeon.

## 2024-08-18 NOTE — ASSESSMENT & PLAN NOTE
Follows pain management  Takes Suboxone secondary to pain, not opioid dependence  We will continue Suboxone and gabapentin

## 2024-08-18 NOTE — ED ATTENDING ATTESTATION
8/18/2024  I, Jericho Canela MD, saw and evaluated the patient. I have discussed the patient with the resident/non-physician practitioner and agree with the resident's/non-physician practitioner's findings, Plan of Care, and MDM as documented in the resident's/non-physician practitioner's note, except where noted. All available labs and Radiology studies were reviewed.  I was present for key portions of any procedure(s) performed by the resident/non-physician practitioner and I was immediately available to provide assistance.       At this point I agree with the current assessment done in the Emergency Department.  I have conducted an independent evaluation of this patient a history and physical is as follows:    ED Course     Patient was seen in urgent care yesterday for what she thought was a bug bite on the right elbow.  Now with increased redness and swelling.  Increased pain.  Having fevers and chills.  Is on Plaquenil.  Fever of 101 at home.  Decreased appetite.    On exam the patient is awake and alert.  Nontoxic-appearing.  Lungs are clear to auscultation.  Abdomen soft and nontender with good bowel sounds.  Right arm is significantly swollen red and warm.  Tender to palpation along the posterior aspect of the elbow.  No drainage.  No definite area of fluctuance.    Critical Care Time  Procedures

## 2024-08-18 NOTE — ASSESSMENT & PLAN NOTE
Patient reports that she has had a wound for the last 3 days.  There is been increasing redness, warmth, and tenderness to her right elbow.  This prompted her to be seen in the urgent care on 8/16.  She is now taken 3 doses of prescribed Doxy cyclin and has noticed worsening redness, warmth, and tenderness.  She also had a recorded temperature of 101.4 °F last night.  Patient is immunocompromise secondary to SLE medications.    Does not meet SIRS criteria on admission, afebrile  WBC 11.9 K  Procalcitonin pending  Given one-time dose of ceftriaxone in ED, will transition to Ancef  Will hold SLE medications  Blood culture pending  Lactic acid 1.1  Obtain vas duplex upper extremity to rule out DVT
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
## 2024-08-18 NOTE — PLAN OF CARE
Problem: PAIN - ADULT  Goal: Verbalizes/displays adequate comfort level or baseline comfort level  Description: Interventions:  - Encourage patient to monitor pain and request assistance  - Assess pain using appropriate pain scale  - Administer analgesics based on type and severity of pain and evaluate response  - Implement non-pharmacological measures as appropriate and evaluate response  - Consider cultural and social influences on pain and pain management  - Notify physician/advanced practitioner if interventions unsuccessful or patient reports new pain  Outcome: Progressing     Problem: INFECTION - ADULT  Goal: Absence or prevention of progression during hospitalization  Description: INTERVENTIONS:  - Assess and monitor for signs and symptoms of infection  - Monitor lab/diagnostic results  - Monitor all insertion sites, i.e. indwelling lines, tubes, and drains  - Monitor endotracheal if appropriate and nasal secretions for changes in amount and color  - [place] appropriate cooling/warming therapies per order  - Administer medications as ordered  - Instruct and encourage patient and family to use good hand hygiene technique  - Identify and instruct in appropriate isolation precautions for identified infection/condition  Outcome: Progressing     Problem: DISCHARGE PLANNING  Goal: Discharge to home or other facility with appropriate resources  Description: INTERVENTIONS:  - Identify barriers to discharge w/patient and caregiver  - Arrange for needed discharge resources and transportation as appropriate  - Identify discharge learning needs (meds, wound care, etc.)  - Arrange for interpretive services to assist at discharge as needed  - Refer to Case Management Department for coordinating discharge planning if the patient needs post-hospital services based on physician/advanced practitioner order or complex needs related to functional status, cognitive ability, or social support system  Outcome: Progressing      Problem: Knowledge Deficit  Goal: Patient/family/caregiver demonstrates understanding of disease process, treatment plan, medications, and discharge instructions  Description: Complete learning assessment and assess knowledge base.  Interventions:  - Provide teaching at level of understanding  - Provide teaching via preferred learning methods  Outcome: Progressing

## 2024-08-19 ENCOUNTER — APPOINTMENT (INPATIENT)
Dept: NON INVASIVE DIAGNOSTICS | Facility: HOSPITAL | Age: 64
DRG: 565 | End: 2024-08-19
Payer: COMMERCIAL

## 2024-08-19 LAB
ANION GAP SERPL CALCULATED.3IONS-SCNC: 4 MMOL/L (ref 4–13)
BASOPHILS # BLD AUTO: 0.05 THOUSANDS/ÂΜL (ref 0–0.1)
BASOPHILS NFR BLD AUTO: 0 % (ref 0–1)
BUN SERPL-MCNC: 14 MG/DL (ref 5–25)
CALCIUM SERPL-MCNC: 8.5 MG/DL (ref 8.4–10.2)
CHLORIDE SERPL-SCNC: 105 MMOL/L (ref 96–108)
CO2 SERPL-SCNC: 29 MMOL/L (ref 21–32)
CREAT SERPL-MCNC: 0.66 MG/DL (ref 0.6–1.3)
EOSINOPHIL # BLD AUTO: 0.09 THOUSAND/ÂΜL (ref 0–0.61)
EOSINOPHIL NFR BLD AUTO: 1 % (ref 0–6)
ERYTHROCYTE [DISTWIDTH] IN BLOOD BY AUTOMATED COUNT: 12.6 % (ref 11.6–15.1)
GFR SERPL CREATININE-BSD FRML MDRD: 94 ML/MIN/1.73SQ M
GLUCOSE SERPL-MCNC: 90 MG/DL (ref 65–140)
HCT VFR BLD AUTO: 33.8 % (ref 34.8–46.1)
HGB BLD-MCNC: 10.9 G/DL (ref 11.5–15.4)
IMM GRANULOCYTES # BLD AUTO: 0.04 THOUSAND/UL (ref 0–0.2)
IMM GRANULOCYTES NFR BLD AUTO: 0 % (ref 0–2)
LYMPHOCYTES # BLD AUTO: 1.75 THOUSANDS/ÂΜL (ref 0.6–4.47)
LYMPHOCYTES NFR BLD AUTO: 15 % (ref 14–44)
MCH RBC QN AUTO: 30.1 PG (ref 26.8–34.3)
MCHC RBC AUTO-ENTMCNC: 32.2 G/DL (ref 31.4–37.4)
MCV RBC AUTO: 93 FL (ref 82–98)
MONOCYTES # BLD AUTO: 1.34 THOUSAND/ÂΜL (ref 0.17–1.22)
MONOCYTES NFR BLD AUTO: 11 % (ref 4–12)
NEUTROPHILS # BLD AUTO: 8.69 THOUSANDS/ÂΜL (ref 1.85–7.62)
NEUTS SEG NFR BLD AUTO: 73 % (ref 43–75)
NRBC BLD AUTO-RTO: 0 /100 WBCS
PLATELET # BLD AUTO: 248 THOUSANDS/UL (ref 149–390)
PMV BLD AUTO: 9.9 FL (ref 8.9–12.7)
POTASSIUM SERPL-SCNC: 4.7 MMOL/L (ref 3.5–5.3)
RBC # BLD AUTO: 3.62 MILLION/UL (ref 3.81–5.12)
SODIUM SERPL-SCNC: 138 MMOL/L (ref 135–147)
WBC # BLD AUTO: 11.96 THOUSAND/UL (ref 4.31–10.16)

## 2024-08-19 PROCEDURE — 80048 BASIC METABOLIC PNL TOTAL CA: CPT

## 2024-08-19 PROCEDURE — 99222 1ST HOSP IP/OBS MODERATE 55: CPT | Performed by: ORTHOPAEDIC SURGERY

## 2024-08-19 PROCEDURE — 85025 COMPLETE CBC W/AUTO DIFF WBC: CPT

## 2024-08-19 PROCEDURE — 93971 EXTREMITY STUDY: CPT

## 2024-08-19 PROCEDURE — 93971 EXTREMITY STUDY: CPT | Performed by: SURGERY

## 2024-08-19 PROCEDURE — 99232 SBSQ HOSP IP/OBS MODERATE 35: CPT

## 2024-08-19 RX ADMIN — CEFAZOLIN SODIUM 1000 MG: 1 SOLUTION INTRAVENOUS at 03:48

## 2024-08-19 RX ADMIN — GABAPENTIN 600 MG: 300 CAPSULE ORAL at 16:24

## 2024-08-19 RX ADMIN — KETOROLAC TROMETHAMINE 15 MG: 30 INJECTION, SOLUTION INTRAMUSCULAR; INTRAVENOUS at 19:42

## 2024-08-19 RX ADMIN — KETOROLAC TROMETHAMINE 15 MG: 30 INJECTION, SOLUTION INTRAMUSCULAR; INTRAVENOUS at 10:18

## 2024-08-19 RX ADMIN — GABAPENTIN 600 MG: 300 CAPSULE ORAL at 08:24

## 2024-08-19 RX ADMIN — LOSARTAN POTASSIUM 50 MG: 50 TABLET, FILM COATED ORAL at 08:24

## 2024-08-19 RX ADMIN — CEFAZOLIN SODIUM 1000 MG: 1 SOLUTION INTRAVENOUS at 19:49

## 2024-08-19 RX ADMIN — CEFAZOLIN SODIUM 1000 MG: 1 SOLUTION INTRAVENOUS at 11:38

## 2024-08-19 NOTE — ASSESSMENT & PLAN NOTE
BP mildly elevated at 156/96  Takes losartan and amlodipine in the outpatient setting  We will continue with BP meds, patient unsure of amlodipine dosing, will defer amlodipine at this time and add if needed based on BP

## 2024-08-19 NOTE — UTILIZATION REVIEW
Initial Clinical Review    Admission: Date/Time/Statement:   Admission Orders (From admission, onward)       Ordered        08/18/24 1453  INPATIENT ADMISSION  Once                          Orders Placed This Encounter   Procedures    INPATIENT ADMISSION     Standing Status:   Standing     Number of Occurrences:   1     Order Specific Question:   Level of Care     Answer:   Med Surg [16]     Order Specific Question:   Estimated length of stay     Answer:   More than 2 Midnights     Order Specific Question:   Certification     Answer:   I certify that inpatient services are medically necessary for this patient for a duration of greater than two midnights. See H&P and MD Progress Notes for additional information about the patient's course of treatment.     ED Arrival Information       Expected   -    Arrival   8/18/2024 14:04    Acuity   Urgent              Means of arrival   Walk-In    Escorted by   Self    Service   Hospitalist    Admission type   Emergency              Arrival complaint   arm pain & swelling             Chief Complaint   Patient presents with    Arm Swelling     Pt presented to this ED from home c/o right arm pain, swelling, warm to touch w/fever and headache starting yesterday. Hx cellulitis. Pt seen at urgent care yesterday started on abx.        Initial Presentation: 64 y.o. female to ED via walk-in from home  Present to ED with cellulitis. Patient reports that she has had a wound for the last 3 days.  There is been increasing redness, warmth, and tenderness to her right elbow.  This prompted her to be seen in the urgent care on 8/16.  She is now taken 3 doses of prescribed Doxy cyclin and has noticed worsening redness, warmth, and tenderness.  She also had a recorded temperature of 101.4 °F last night.  Patient is immunocompromise secondary to SLE medications.   PMHX: SLE, HTN; COPD, Lupus, RA, Sjogren's disease   Admitted to [facility] with DX: Cellulitis  on exam: hypertensive; pain 6/10; Also having  chills. Erythema (Right extremity from mid humeral region to metacarpals extensive erythema, tenderness, and warmth.  Lesion noted posterior side near elbow.  No discharge appreciated) and lesion present. WBC 11.99  CT right upper extremity: Prominent fluid in the region of the olecranon process of the ulna with peripheral enhancement concerning for infective olecranon bursitis. There is also a prominent elbow joint effusion identified for which infection would not be excluded. No soft tissue emphysema or osseous destructive change identified.  PLAN: cont iv abx; monitor labs; pain control (see below); f/u procal; f/u blood cx; f/u vasc duplex UE; ortho consult       Anticipated Length of Stay/Certification Statement: Patient will be admitted on an inpatient basis with an anticipated length of stay of greater than 2 midnights secondary to cellulitis.       Date: 8/19/24      Day 2   Said that she is feeling a little better today. Still with swelling and redness to the arm. She feels her knuckles are a little more swollen than they were yesterday. Erythema (right arm and elbow spreading down towards the metacarpals with swelling noted (patient states slightly above baseline swelling with her RA). There is area of fluctuation noted posterior area of the right elbow without drainage appreciated) present. WBC 11.96  Plan: cont iv abx; monitor labs; pain control (see below); f/u procal; f/u blood cx; f/u vasc duplex UE      ORTHO CONSULT  Assessment: 64 y.o.female with  right forearm cellulitis and slight and fluid collection in the olecranon bursa with mild clinical presentation compared to CT findings.  Plan: Current plan will be to continue the IV antibiotics. If she continues to clinically improve, surgical intervention will not be necessary. At this time, no surgery will be planned.     ED Triage Vitals [08/18/24 1408]   Temperature Pulse Respirations Blood Pressure SpO2 Pain Score   (!) 97.4 °F (36.3 °C) 86 18  156/96 97 % 6     Weight (last 2 days)       Date/Time Weight    08/18/24 1703 64.9 (143.08)    08/18/24 1408 65.3 (144)            Vital Signs (last 3 days)       Date/Time Temp Pulse Resp BP MAP (mmHg) SpO2 O2 Device Patient Position - Orthostatic VS [place] Coma Scale Score Pain    08/19/24 1018 -- -- -- -- -- -- -- -- -- 8    08/19/24 0733 -- -- -- -- -- -- None (Room air) -- 15 No Pain    08/19/24 0612 97.3 °F (36.3 °C) 64 18 133/75 93 90 % None (Room air) Lying -- --    08/18/24 21:39:21 97.4 °F (36.3 °C) 60 18 96/67 72 92 % None (Room air) Lying -- --    08/18/24 2100 -- -- -- -- -- -- None (Room air) -- 15 No Pain    08/18/24 1747 -- -- -- -- -- -- -- -- -- 8    08/18/24 1619 -- -- -- -- -- -- -- -- -- 4    08/18/24 16:16:09 -- 85 18 134/75 95 95 % -- -- -- --    08/18/24 16:11:55 97.2 °F (36.2 °C) 84 -- -- -- 94 % -- -- -- --    08/18/24 1608 -- -- -- -- -- -- None (Room air) -- -- --    08/18/24 1408 97.4 °F (36.3 °C) 86 18 156/96 -- 97 % None (Room air) Sitting -- 6              Pertinent Labs/Diagnostic Test Results:   Radiology:  CT upper extremity w contrast right   Final Interpretation by Angel Yarbrough MD (08/18 1731)   Prominent fluid in the region of the olecranon process of the ulna with peripheral enhancement concerning for infective olecranon bursitis. There is also a prominent elbow joint effusion identified for which infection would not be excluded. No soft    tissue emphysema or osseous destructive change identified.      The study was marked in EPIC for immediate notification.            Workstation performed: [id number]         VAS upper limb venous duplex scan, unilateral/limited    (Results Pending)       Results from last 7 days   Lab Units 08/19/24 0528 08/18/24  1418   WBC Thousand/uL 11.96* 11.99*   HEMOGLOBIN g/dL 10.9* 12.7   HEMATOCRIT % 33.8* 39.1   PLATELETS Thousands/uL 248 276   TOTAL NEUT ABS Thousands/µL 8.69* 9.90*        Results from last 7 days   Lab Units 08/19/24 0528  08/18/24  1418   SODIUM mmol/L 138 136   POTASSIUM mmol/L 4.7 3.6   CHLORIDE mmol/L 105 102   CO2 mmol/L 29 27   ANION GAP mmol/L 4 7   BUN mg/dL 14 13   CREATININE mg/dL 0.66 0.70   EGFR ml/min/1.73sq m 94 93   CALCIUM mg/dL 8.5 9.2     Results from last 7 days   Lab Units 08/18/24  1418   AST U/L 14   ALT U/L 13   ALK PHOS U/L 70   TOTAL PROTEIN g/dL 6.8   ALBUMIN g/dL 4.1   TOTAL BILIRUBIN mg/dL 1.32*        Results from last 7 days   Lab Units 08/19/24  0528 08/18/24  1418   GLUCOSE RANDOM mg/dL 90 98        Results from last 7 days   Lab Units 08/18/24  1418   PROCALCITONIN ng/ml <0.05     Results from last 7 days   Lab Units 08/18/24  1418   LACTIC ACID mmol/L 1.1       Results from last 7 days   Lab Units 08/18/24  1418   BLOOD CULTURE  Received in Microbiology Lab. Culture in Progress.       ED Treatment-Medication Administration from 08/18/2024 1357 to 08/18/2024 1604         Date/Time Order Dose Route Action     08/18/2024 1418 cefTRIAXone (ROCEPHIN) IVPB (premix in dextrose) 1,000 mg 50 mL 1,000 mg Intravenous New Bag            Past Medical History:   Diagnosis Date    COPD (chronic obstructive pulmonary disease) ([facility])     Hypertension     Laceration of liver 10/14/2022    REPAIRED 1977    Long term (current) use of systemic steroids 6/1/2017    Lupus (HCC)     RA (rheumatoid arthritis) ([facility])     Sjogren's disease (HCC)      Present on Admission:   Essential hypertension   Lupus (HCC)   RA (rheumatoid arthritis) ([facility])      Admitting Diagnosis: Arm swelling [M79.89]  Cellulitis of right upper extremity [L03.113]    Age/Sex: 64 y.o. female    Admission Orders: SCDs; I/O; regular diet    Scheduled Medications:  cefazolin, 1,000 mg, Intravenous, Q8H  gabapentin, 600 mg, Oral, Q8H  losartan, 50 mg, Oral, Daily      Continuous IV Infusions: None       PRN Meds:  acetaminophen, 650 mg, Oral, Q6H PRN (8/18 recd x1)    buprenorphine-naloxone, 8 mg, Sublingual, Daily PRN  ketorolac, 15 mg, Intravenous, Q6H PRN   (8/18 recd x1)  (8/19 recd x1 so far today)  ondansetron, 4 mg, Intravenous, Q6H PRN        IP CONSULT TO ORTHOPEDIC SURGERY    Network Utilization Review Department  ATTENTION: Please call with any questions or concerns to [phone] and carefully listen to the prompts so that you are directed to the right person. All voicemails are confidential.   For Discharge needs, contact Care Management DC Support Team at [phone] opt. [phone]  Send all requests for admission clinical reviews, approved or denied determinations and any other requests to dedicated fax number below belonging to the campus where the patient is receiving treatment. List of dedicated fax numbers for the Facilities:  FACILITY NAME UR FAX NUMBER   ADMISSION DENIALS (Administrative/Medical Necessity) [phone]   DISCHARGE SUPPORT TEAM (NETWORK) [phone]   PARENT CHILD HEALTH (Maternity/NICU/Pediatrics) [phone]   [facility] [phone]   [facility] [phone]   [facility] [phone]   [facility] [phone]   [facility] [phone]   [facility] [phone]   [facility] [phone]   [facility] [phone]   [facility] [phone]   [facility] [phone]   [facility] [phone]   [facility] [phone]
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
## 2024-08-19 NOTE — PLAN OF CARE
Problem: PAIN - ADULT  Goal: Verbalizes/displays adequate comfort level or baseline comfort level  Description: Interventions:  - Encourage patient to monitor pain and request assistance  - Assess pain using appropriate pain scale  - Administer analgesics based on type and severity of pain and evaluate response  - Implement non-pharmacological measures as appropriate and evaluate response  - Consider cultural and social influences on pain and pain management  - Notify physician/advanced practitioner if interventions unsuccessful or patient reports new pain  Outcome: Progressing     Problem: INFECTION - ADULT  Goal: Absence or prevention of progression during hospitalization  Description: INTERVENTIONS:  - Assess and monitor for signs and symptoms of infection  - Monitor lab/diagnostic results  - Monitor all insertion sites, i.e. indwelling lines, tubes, and drains  - Monitor endotracheal if appropriate and nasal secretions for changes in amount and color  - [place] appropriate cooling/warming therapies per order  - Administer medications as ordered  - Instruct and encourage patient and family to use good hand hygiene technique  - Identify and instruct in appropriate isolation precautions for identified infection/condition  Outcome: Progressing     Problem: SAFETY ADULT  Goal: Patient will remain free of falls  Description: INTERVENTIONS:  - Educate patient/family on patient safety including physical limitations  - Instruct patient to call for assistance with activity   - Consult OT/PT to assist with strengthening/mobility   - Keep Call bell within reach  - Keep bed low and locked with side rails adjusted as appropriate  - Keep care items and personal belongings within reach  - Initiate and maintain comfort rounds  - Make Fall Risk Sign visible to staff  - Offer Toileting every 2 Hours, in advance of need  - Initiate/Maintain   alarm  - Obtain necessary fall risk management equipment:     - Apply yellow socks and  bracelet for high fall risk patients  - Consider moving patient to room near nurses station  Outcome: Progressing  Goal: Maintain or return to baseline ADL function  Description: INTERVENTIONS:  -  Assess patient's ability to carry out ADLs; assess patient's baseline for ADL function and identify physical deficits which impact ability to perform ADLs (bathing, care of mouth/teeth, toileting, grooming, dressing, etc.)  - Assess/evaluate cause of self-care deficits   - Assess range of motion  - Assess patient's mobility; develop plan if impaired  - Assess patient's need for assistive devices and provide as appropriate  - Encourage maximum independence but intervene and supervise when necessary  - Involve family in performance of ADLs  - Assess for home care needs following discharge   - Consider OT consult to assist with ADL evaluation and planning for discharge  - Provide patient education as appropriate  Outcome: Progressing  Goal: Maintains/Returns to pre admission functional level  Description: INTERVENTIONS:  - Perform AM-PAC 6 Click Basic Mobility/ Daily Activity assessment daily.  - Set and communicate daily mobility goal to care team and patient/family/caregiver.   - Collaborate with rehabilitation services on mobility goals if consulted  - Perform Range of Motion 3 times a day.  - Reposition patient every 2 hours.  - Dangle patient 3 times a day  - Stand patient 3 times a day  - Ambulate patient 3 times a day  - Out of bed to chair 3 times a day   - Out of bed for meals 3 times a day  - Out of bed for toileting  - Record patient progress and toleration of activity level   Outcome: Progressing     Problem: DISCHARGE PLANNING  Goal: Discharge to home or other facility with appropriate resources  Description: INTERVENTIONS:  - Identify barriers to discharge w/patient and caregiver  - Arrange for needed discharge resources and transportation as appropriate  - Identify discharge learning needs (meds, wound care,  etc.)  - Arrange for interpretive services to assist at discharge as needed  - Refer to Case Management Department for coordinating discharge planning if the patient needs post-hospital services based on physician/advanced practitioner order or complex needs related to functional status, cognitive ability, or social support system  Outcome: Progressing     Problem: Knowledge Deficit  Goal: Patient/family/caregiver demonstrates understanding of disease process, treatment plan, medications, and discharge instructions  Description: Complete learning assessment and assess knowledge base.  Interventions:  - Provide teaching at level of understanding  - Provide teaching via preferred learning methods  Outcome: Progressing

## 2024-08-19 NOTE — ASSESSMENT & PLAN NOTE
Patient reports that she has had a wound for the last 3 days.  There is been increasing redness, warmth, and tenderness to her right elbow.  This prompted her to be seen in the urgent care on 8/16.  She is now taken 3 doses of prescribed Doxy cyclin and has noticed worsening redness, warmth, and tenderness.  She also had a recorded temperature of 101.4 °F last night.  Patient is immunocompromise secondary to SLE medications.    Does not meet SIRS criteria on admission, afebrile  WBC 11.9 K  Procalcitonin negative  Given one-time dose of ceftriaxone in ED, will transition to Ancef  Will hold SLE medications  Blood culture pending  Lactic acid 1.1  Obtain vas duplex upper extremity to rule out DVT  CT right upper extremity: Prominent fluid in the region of the olecranon process of the ulna with peripheral enhancement concerning for infective olecranon bursitis. There is also a prominent elbow joint effusion identified for which infection would not be excluded. No soft tissue emphysema or osseous destructive change identified.  Ortho consulted: continue ancef, elevate right hand, gentle wrist and finger ROM, heat to affected area, no surgery at this time

## 2024-08-19 NOTE — UTILIZATION REVIEW
NOTIFICATION OF INPATIENT ADMISSION   AUTHORIZATION REQUEST   SERVICING FACILITY:   [place] [address]  Tax ID: [id number]  NPI: [id number] ATTENDING PROVIDER:  Attending Name and NPI#: Korina Erickson Md [[id number]]  Address: [address] [place] [address]  Phone: [phone]   ADMISSION INFORMATION:  Place of Service: Inpatient [facility]  Place of Service Code: 21  Inpatient Admission Date/Time: 8/18/24  2:53 PM  Discharge Date/Time: No discharge date for patient encounter.  Admitting Diagnosis Code/Description:  Arm swelling [M79.89]  Cellulitis of right upper extremity [L03.113]     UTILIZATION REVIEW CONTACT:  Meghana Hernández, Utilization   Network Utilization Review Department  Phone: [phone]  Fax [phone]  Email: [email or web address]  Contact for approvals/pending authorizations, clinical reviews, and discharge.     PHYSICIAN ADVISORY SERVICES:  Medical Necessity Denial & Ndxr-yg-Gchq Review  Phone: [phone]  Fax: [phone]  Email: [email or web address]     DISCHARGE SUPPORT TEAM:  For Patients Discharge Needs & Updates  Phone: [phone] opt. 2 Fax: [phone]  Email: [email or web address]

## 2024-08-19 NOTE — PROGRESS NOTES
[facility]  Progress Note  Name: Nadia Almanzar I  MRN: [id number]  Unit/Bed#: MS [facility] I Date of Admission: 8/18/2024   Date of Service: 8/19/2024 I Hospital Day: 1    Assessment & Plan   * Cellulitis  Assessment & Plan  Patient reports that she has had a wound for the last 3 days.  There is been increasing redness, warmth, and tenderness to her right elbow.  This prompted her to be seen in the urgent care on 8/16.  She is now taken 3 doses of prescribed Doxy cyclin and has noticed worsening redness, warmth, and tenderness.  She also had a recorded temperature of 101.4 °F last night.  Patient is immunocompromise secondary to SLE medications.    Does not meet SIRS criteria on admission, afebrile  WBC 11.9 K  Procalcitonin negative  Given one-time dose of ceftriaxone in ED, will transition to Ancef  Will hold SLE medications  Blood culture pending  Lactic acid 1.1  Obtain vas duplex upper extremity to rule out DVT  CT right upper extremity: Prominent fluid in the region of the olecranon process of the ulna with peripheral enhancement concerning for infective olecranon bursitis. There is also a prominent elbow joint effusion identified for which infection would not be excluded. No soft tissue emphysema or osseous destructive change identified.  Ortho consulted: continue ancef, elevate right hand, gentle wrist and finger ROM, heat to affected area, no surgery at this time    RA (rheumatoid arthritis) ([facility])  Assessment & Plan  Follows pain management  Takes Suboxone secondary to pain, not opioid dependence  We will continue Suboxone and gabapentin    Lupus ([facility])  Assessment & Plan  Takes Plaquenil, prednisone, and Benlysta injection  Has recently been taking prednisone 30 mg instead of maintenance 5 mg because of a recent flareup  Benlysta injection Received in May, gets injection about every 4 months  Will hold Plaquenil, prednisone, and Benlysta secondary to cellulitic  infection    Essential hypertension  Assessment & Plan  BP mildly elevated at 156/96  Takes losartan and amlodipine in the outpatient setting  We will continue with BP meds, patient unsure of amlodipine dosing, will defer amlodipine at this time and add if needed based on BP         VTE Pharmacologic Prophylaxis: VTE Score: 4 Moderate Risk (Score 3-4) - Pharmacological DVT Prophylaxis Contraindicated. Sequential Compression Devices Ordered.    Mobility:   Basic Mobility Inpatient Raw Score: 22  JH-HLM Goal: 7: Walk 25 feet or more  JH-HLM Achieved: 7: Walk 25 feet or more  JH-HLM Goal achieved. Continue to encourage appropriate mobility.    Patient Centered Rounds: I performed bedside rounds with nursing staff today.   Discussions with Specialists or Other Care Team Provider: nursing, case management, orthopedics    Education and Discussions with Family / Patient: Updated  () via phone.    Total Time Spent on Date of Encounter in care of patient:  mins. This time was spent on one or more of the following: performing physical exam; counseling and coordination of care; obtaining or reviewing history; documenting in the medical record; reviewing/ordering tests, medications or procedures; communicating with other healthcare professionals and discussing with patient's family/caregivers.    Current Length of Stay: 1 day(s)  Current Patient Status: Inpatient   Certification Statement: The patient will continue to require additional inpatient hospital stay due to cellulitis right elbow with effusion  Discharge Plan: Anticipate discharge in 48-72 hrs to home.    Code Status: Level 1 - Full Code    Subjective:   Seen and examined today. Said that she is feeling a little better today. Still with swelling and redness to the arm. She feels her knuckles are a little more swollen than they were yesterday. No nausea, vomiting, diarrhea, constipation, abdominal pain, CP, SOB, fevers, chills. She is hopeful the  arm will get better and she won't need any surgical intervention.     Objective:     Vitals:   Temp (24hrs), Av.3 °F (36.3 °C), Min:97.2 °F (36.2 °C), Max:97.4 °F (36.3 °C)    Temp:  [97.2 °F (36.2 °C)-97.4 °F (36.3 °C)] 97.3 °F (36.3 °C)  HR:  [60-86] 64  Resp:  [18] 18  BP: ()/(67-96) 133/75  SpO2:  [90 %-97 %] 90 %  Body mass index is 25.35 kg/m².     Input and Output Summary (last 24 hours):     Intake/Output Summary (Last 24 hours) at 2024 1041  Last data filed at 2024 2138  Gross per 24 hour   Intake 110 ml   Output 800 ml   Net -690 ml       Physical Exam:   Physical Exam  Vitals reviewed.   Constitutional:       General: She is not in acute distress.     Appearance: She is not ill-appearing or toxic-appearing.   HENT:      Head: Normocephalic and atraumatic.      Mouth/Throat:      Mouth: Mucous membranes are moist.   Cardiovascular:      Rate and Rhythm: Normal rate and regular rhythm.      Heart sounds: No murmur heard.  Pulmonary:      Effort: No respiratory distress.      Breath sounds: No stridor. No wheezing.   Abdominal:      General: Bowel sounds are normal. There is no distension.      Palpations: Abdomen is soft. There is no mass.      Tenderness: There is no abdominal tenderness.   Musculoskeletal:         General: Swelling present. No tenderness (no tenderness on palpation during examination at this time).      Right lower leg: No edema.      Left lower leg: No edema.   Skin:     General: Skin is warm and dry.      Findings: Erythema (right arm and elbow spreading down towards the metacarpals with swelling noted (patient states slightly above baseline swelling with her RA). There is area of fluctuation noted posterior area of the right elbow without drainage appreciated) present.   Neurological:      General: No focal deficit present.      Mental Status: She is alert and oriented to person, place, and time.   Psychiatric:         Mood and Affect: Mood normal.         Behavior:  Behavior normal.          Additional Data:     Labs:  Results from last 7 days   Lab Units 08/19/24  0528   WBC Thousand/uL 11.96*   HEMOGLOBIN g/dL 10.9*   HEMATOCRIT % 33.8*   PLATELETS Thousands/uL 248   SEGS PCT % 73   LYMPHO PCT % 15   MONO PCT % 11   EOS PCT % 1     Results from last 7 days   Lab Units 08/19/24  0528 08/18/24  1418   SODIUM mmol/L 138 136   POTASSIUM mmol/L 4.7 3.6   CHLORIDE mmol/L 105 102   CO2 mmol/L 29 27   BUN mg/dL 14 13   CREATININE mg/dL 0.66 0.70   ANION GAP mmol/L 4 7   CALCIUM mg/dL 8.5 9.2   ALBUMIN g/dL  --  4.1   TOTAL BILIRUBIN mg/dL  --  1.32*   ALK PHOS U/L  --  70   ALT U/L  --  13   AST U/L  --  14   GLUCOSE RANDOM mg/dL 90 98                 Results from last 7 days   Lab Units 08/18/24  1418   LACTIC ACID mmol/L 1.1   PROCALCITONIN ng/ml <0.05       Lines/Drains:  Invasive Devices       Peripheral Intravenous Line  Duration             Peripheral IV 08/18/24 Left;Proximal;Ventral (anterior) Forearm <1 day                          Imaging: Reviewed radiology reports from this admission including: CT right upper extremity    Recent Cultures (last 7 days):   Results from last 7 days   Lab Units 08/18/24  1418   BLOOD CULTURE  Received in Microbiology Lab. Culture in Progress.       Last 24 Hours Medication List:   Current Facility-Administered Medications   Medication Dose Route Frequency Provider Last Rate    acetaminophen  650 mg Oral Q6H PRN Gilson Marvin Mchugh PA-C      buprenorphine-naloxone  8 mg Sublingual Daily PRN Gilson Marvin Mchugh PA-C      cefazolin  1,000 mg Intravenous Q8H Gilson Marvin Mchugh PA-C 1,000 mg (08/19/24 0348)    gabapentin  600 mg Oral Q8H Gilson Marvin Mchugh PA-C      ketorolac  15 mg Intravenous Q6H PRN Amira Graham PA-C      losartan  50 mg Oral Daily Gilson Marvin Mchugh PA-C      ondansetron  4 mg Intravenous Q6H PRN Gilson Marvin Mchugh PA-C          Today, Patient Was Seen By: Amira Graham PA-C    **Please Note: This note may  have been constructed using a voice recognition system.**

## 2024-08-20 LAB
ANION GAP SERPL CALCULATED.3IONS-SCNC: 4 MMOL/L (ref 4–13)
BUN SERPL-MCNC: 12 MG/DL (ref 5–25)
CALCIUM SERPL-MCNC: 8.3 MG/DL (ref 8.4–10.2)
CHLORIDE SERPL-SCNC: 104 MMOL/L (ref 96–108)
CO2 SERPL-SCNC: 29 MMOL/L (ref 21–32)
CREAT SERPL-MCNC: 0.62 MG/DL (ref 0.6–1.3)
ERYTHROCYTE [DISTWIDTH] IN BLOOD BY AUTOMATED COUNT: 12.2 % (ref 11.6–15.1)
GFR SERPL CREATININE-BSD FRML MDRD: 96 ML/MIN/1.73SQ M
GLUCOSE SERPL-MCNC: 89 MG/DL (ref 65–140)
HCT VFR BLD AUTO: 36.3 % (ref 34.8–46.1)
HGB BLD-MCNC: 11.6 G/DL (ref 11.5–15.4)
MCH RBC QN AUTO: 30.1 PG (ref 26.8–34.3)
MCHC RBC AUTO-ENTMCNC: 32 G/DL (ref 31.4–37.4)
MCV RBC AUTO: 94 FL (ref 82–98)
PLATELET # BLD AUTO: 278 THOUSANDS/UL (ref 149–390)
PMV BLD AUTO: 9.8 FL (ref 8.9–12.7)
POTASSIUM SERPL-SCNC: 4.1 MMOL/L (ref 3.5–5.3)
RBC # BLD AUTO: 3.86 MILLION/UL (ref 3.81–5.12)
SODIUM SERPL-SCNC: 137 MMOL/L (ref 135–147)
WBC # BLD AUTO: 8.69 THOUSAND/UL (ref 4.31–10.16)

## 2024-08-20 PROCEDURE — 99231 SBSQ HOSP IP/OBS SF/LOW 25: CPT | Performed by: ORTHOPAEDIC SURGERY

## 2024-08-20 PROCEDURE — 99232 SBSQ HOSP IP/OBS MODERATE 35: CPT

## 2024-08-20 PROCEDURE — 80048 BASIC METABOLIC PNL TOTAL CA: CPT

## 2024-08-20 PROCEDURE — 85027 COMPLETE CBC AUTOMATED: CPT

## 2024-08-20 RX ORDER — KETOROLAC TROMETHAMINE 30 MG/ML
15 INJECTION, SOLUTION INTRAMUSCULAR; INTRAVENOUS EVERY 6 HOURS PRN
Status: DISPENSED | OUTPATIENT
Start: 2024-08-20 | End: 2024-08-21

## 2024-08-20 RX ADMIN — GABAPENTIN 600 MG: 300 CAPSULE ORAL at 00:16

## 2024-08-20 RX ADMIN — GABAPENTIN 600 MG: 300 CAPSULE ORAL at 16:43

## 2024-08-20 RX ADMIN — KETOROLAC TROMETHAMINE 15 MG: 30 INJECTION, SOLUTION INTRAMUSCULAR; INTRAVENOUS at 22:39

## 2024-08-20 RX ADMIN — GABAPENTIN 600 MG: 300 CAPSULE ORAL at 10:31

## 2024-08-20 RX ADMIN — LOSARTAN POTASSIUM 50 MG: 50 TABLET, FILM COATED ORAL at 10:31

## 2024-08-20 RX ADMIN — CEFAZOLIN SODIUM 1000 MG: 1 SOLUTION INTRAVENOUS at 19:47

## 2024-08-20 RX ADMIN — KETOROLAC TROMETHAMINE 15 MG: 30 INJECTION, SOLUTION INTRAMUSCULAR; INTRAVENOUS at 16:44

## 2024-08-20 RX ADMIN — KETOROLAC TROMETHAMINE 15 MG: 30 INJECTION, SOLUTION INTRAMUSCULAR; INTRAVENOUS at 10:31

## 2024-08-20 RX ADMIN — CEFAZOLIN SODIUM 1000 MG: 1 SOLUTION INTRAVENOUS at 03:36

## 2024-08-20 RX ADMIN — ACETAMINOPHEN 650 MG: 325 TABLET ORAL at 10:31

## 2024-08-20 RX ADMIN — ACETAMINOPHEN 650 MG: 325 TABLET ORAL at 19:48

## 2024-08-20 RX ADMIN — CEFAZOLIN SODIUM 1000 MG: 1 SOLUTION INTRAVENOUS at 12:02

## 2024-08-20 NOTE — PLAN OF CARE
Problem: PAIN - ADULT  Goal: Verbalizes/displays adequate comfort level or baseline comfort level  Description: Interventions:  - Encourage patient to monitor pain and request assistance  - Assess pain using appropriate pain scale  - Administer analgesics based on type and severity of pain and evaluate response  - Implement non-pharmacological measures as appropriate and evaluate response  - Consider cultural and social influences on pain and pain management  - Notify physician/advanced practitioner if interventions unsuccessful or patient reports new pain  Outcome: Progressing     Problem: INFECTION - ADULT  Goal: Absence or prevention of progression during hospitalization  Description: INTERVENTIONS:  - Assess and monitor for signs and symptoms of infection  - Monitor lab/diagnostic results  - Monitor all insertion sites, i.e. indwelling lines, tubes, and drains  - Monitor endotracheal if appropriate and nasal secretions for changes in amount and color  - [place] appropriate cooling/warming therapies per order  - Administer medications as ordered  - Instruct and encourage patient and family to use good hand hygiene technique  - Identify and instruct in appropriate isolation precautions for identified infection/condition  Outcome: Progressing     Problem: SAFETY ADULT  Goal: Patient will remain free of falls  Description: INTERVENTIONS:  - Educate patient/family on patient safety including physical limitations  - Instruct patient to call for assistance with activity   - Consult OT/PT to assist with strengthening/mobility   - Keep Call bell within reach  - Keep bed low and locked with side rails adjusted as appropriate  - Keep care items and personal belongings within reach  - Initiate and maintain comfort rounds  - Make Fall Risk Sign visible to staff  - Obtain necessary fall risk management equipment: non skid socks  - Apply yellow socks and bracelet for high fall risk patients  - Consider moving patient to  room near nurses station  Outcome: Progressing  Goal: Maintain or return to baseline ADL function  Description: INTERVENTIONS:  -  Assess patient's ability to carry out ADLs; assess patient's baseline for ADL function and identify physical deficits which impact ability to perform ADLs (bathing, care of mouth/teeth, toileting, grooming, dressing, etc.)  - Assess/evaluate cause of self-care deficits   - Assess range of motion  - Assess patient's mobility; develop plan if impaired  - Assess patient's need for assistive devices and provide as appropriate  - Encourage maximum independence but intervene and supervise when necessary  - Involve family in performance of ADLs  - Assess for home care needs following discharge   - Consider OT consult to assist with ADL evaluation and planning for discharge  - Provide patient education as appropriate  Outcome: Progressing  Goal: Maintains/Returns to pre admission functional level  Description: INTERVENTIONS:  - Perform AM-PAC 6 Click Basic Mobility/ Daily Activity assessment daily.  - Set and communicate daily mobility goal to care team and patient/family/caregiver.   - Collaborate with rehabilitation services on mobility goals if consulted  - Out of bed for toileting  - Record patient progress and toleration of activity level   Outcome: Progressing

## 2024-08-20 NOTE — CASE MANAGEMENT
Case Management Assessment & Discharge Planning Note    Patient name Nadia Almanzar  Location /-01 MRN [id number]  : 1960 Date 2024       Current Admission Date: 2024  Current Admission Diagnosis:Cellulitis   Patient Active Problem List    Diagnosis Date Noted Date Diagnosed    Cellulitis 2024     SBO (small bowel obstruction) (Formerly [facility]) 10/14/2022     RA (rheumatoid arthritis) ([facility]ly [facility]) 10/14/2022     Abdominal wall hernia 2021     Sjogren's syndrome ([facility]) 2020     Lupus ([facility]) 2019     Essential hypertension 2018     Gastroesophageal reflux disease without esophagitis 2018     Relapsing polychondritis 2017       LOS (days): 2  Geometric Mean LOS (GMLOS) (days):   Days to GMLOS:     OBJECTIVE:    Risk of Unplanned Readmission Score: 5.92         Current admission status: Inpatient       Preferred Pharmacy:   [facility] [address] [place] [address] [address] [facility]  [address] [facility] [address]  Phone: [phone] Fax: [phone]    Primary Care Provider: Rubin Mendes MD    Primary Insurance: BLUE CROSS  Secondary Insurance:     ASSESSMENT:  Active Health Care Proxies       Wally Almanzar Health Care Representative - Spouse   Primary Phone: [phone] (Mobile)                           Readmission Root Cause  30 Day Readmission: No    Patient Information  Admitted from:: Home  Mental Status: Alert  During Assessment patient was accompanied by: Not accompanied during assessment  Assessment information provided by:: Patient  Primary Caregiver: Self  Support Systems: Spouse/significant other  [facility] of Residence: [facility]  What city do you live in?: [place]  Home entry access options. Select all that apply.: No steps to enter home  Type of Current Residence: 2 story home  Upon entering residence, is there a bedroom on the main floor (no further steps)?: No  A bedroom is located on the following floor levels of residence  (select all that apply):: 2nd Floor  Upon entering residence, is there a bathroom on the main floor (no further steps)?: No  Indicate which floors of current residence have a bathroom (select all the apply):: 2nd Floor  Number of steps to 2nd floor from main floor: One Flight  Living Arrangements: Lives w/ Spouse/significant other  Is patient a ?: No    Activities of Daily Living Prior to Admission  Functional Status: Independent  Completes ADLs independently?: Yes  Ambulates independently?: Yes  Does patient use assisted devices?: No  Does patient currently own DME?: No  Does patient have a history of Outpatient Therapy (PT/OT)?: No  Does the patient have a history of Short-Term Rehab?: No  Does patient have a history of HHC?: Yes  Does patient currently have HHC?: No         Patient Information Continued  Income Source: SSI/SSD  Does patient have prescription coverage?: Yes  Does patient receive dialysis treatments?: No  Does patient have a history of substance abuse?: No  Does patient have a history of Mental Health Diagnosis?: No         Means of Transportation  Means of Transport to Appts:: Drives Self      Social Determinants of Health (SDOH)      Flowsheet Row Most Recent Value   Housing Stability    In the last 12 months, was there a time when you were not able to pay the mortgage or rent on time? N   In the past 12 months, how many times have you moved where you were living? 0   At any time in the past 12 months, were you homeless or living in a shelter (including now)? N   Transportation Needs    In the past 12 months, has lack of transportation kept you from medical appointments or from getting medications? no   In the past 12 months, has lack of transportation kept you from meetings, work, or from getting things needed for daily living? No   Food Insecurity    Within the past 12 months, you worried that your food would run out before you got the money to buy more. Never true   Within the past 12  months, the food you bought just didn't last and you didn't have money to get more. Never true   Utilities    In the past 12 months has the electric, gas, oil, or water company threatened to shut off services in your home? No            DISCHARGE DETAILS:    Discharge planning discussed with:: Patient at bedside.  Freedom of Choice: Yes  Comments - Freedom of Choice: No rehab needs anticipated.  CM contacted family/caregiver?: No- see comments (Patient independenty and able to make own medical decisions.)  Were Treatment Team discharge recommendations reviewed with patient/caregiver?: Yes  Did patient/caregiver verbalize understanding of patient care needs?: Yes  Were patient/caregiver advised of the risks associated with not following Treatment Team discharge recommendations?: Yes    Contacts  Patient Contacts: Patient  Relationship to Patient:: Other (Comment) (Self)  Contact Method: In Person  Reason/Outcome: Continuity of Care, Discharge Planning    Requested Home Health Care         Is the patient interested in HHC at discharge?: No    DME Referral Provided  Referral made for DME?: No    Other Referral/Resources/Interventions Provided:  Referral Comments: No referrals sent at this time. No rehab needs anticipated.    Additional Comments: CM met with patient at bedside. Pt fully independent with ADL's and ambulation prior to admission. Pt resides with her spouse in a 2-story home. Pt has one flight of stairs to access 2nd floor. Pt does not own or use DME. Pt denied hx of STR but has had HHC in past. Pt denied hx of MH and substance abuse. No CM needs identified at this time.

## 2024-08-20 NOTE — ASSESSMENT & PLAN NOTE
Patient reports that she has had a wound for the last 3 days.  There is been increasing redness, warmth, and tenderness to her right elbow.  This prompted her to be seen in the urgent care on 8/16.  She is now taken 3 doses of prescribed Doxy cyclin and has noticed worsening redness, warmth, and tenderness.  She also had a recorded temperature of 101.4 °F last night.  Patient is immunocompromise secondary to SLE medications.    Does not meet SIRS criteria on admission, afebrile  WBC 11.9 K  Procalcitonin negative  Given one-time dose of ceftriaxone in ED, will transition to Ancef  Will hold SLE medications  Blood culture NG 24 hours   Lactic acid 1.1  Obtain vas duplex upper extremity to rule out DVT - negative  CT right upper extremity: Prominent fluid in the region of the olecranon process of the ulna with peripheral enhancement concerning for infective olecranon bursitis. There is also a prominent elbow joint effusion identified for which infection would not be excluded. No soft tissue emphysema or osseous destructive change identified.  Ortho consulted: continue ancef, elevate right hand, gentle wrist and finger ROM, heat to affected area, no surgery at this time  Continue current treatment plan with IV abx, can transition to oral abx when indicated clinically, once transitioned to oral ortho will see outpatient in 1 week for follow up.   Significant improvement in erythema and swelling at this time

## 2024-08-20 NOTE — PROGRESS NOTES
"Progress Note - Orthopedics   Nadia Almanzar 64 y.o. female MRN: [id number]  Unit/Bed#: -01 Encounter: [id number]    Assessment:  Cellulitis right upper extremity    Plan:  Continue current treatment plan with IV antibiotics but may be transition to oral antibiotics as indicated clinically.  Once transition to oral antibiotics, may be discharged and I will see her next week for follow-up.  I will continue to follow her during continued hospitalization.        Subjective: Nadia reports improvement to continue.  She denies any significant pain, has noted decreasing swelling and states that the small area of skin elevation seems to have \"popped\".    Vitals: Blood pressure 136/99, pulse 68, temperature (!) 97 °F (36.1 °C), resp. rate 18, height 5' 3\" (1.6 m), weight 64.9 kg (143 lb 1.3 oz), SpO2 96%.,Body mass index is 25.35 kg/m².      Intake/Output Summary (Last 24 hours) at 8/20/2024 1145  Last data filed at 8/20/2024 0900  Gross per 24 hour   Intake 0 ml   Output 600 ml   Net -600 ml       Invasive Devices       Peripheral Intravenous Line  Duration             Peripheral IV 08/18/24 Left;Proximal;Ventral (anterior) Forearm 1 day                    Physical Exam: The right arm exam demonstrates excellent range of motion at all joints with out complaints.  She has less swelling and redness today.  The small pustular area is now no longer evident with a flattened area of slightly whitened skin without expressible drainage.  Distal sensation is intact.    Lab, Imaging and other studies: CBC:   Lab Results   Component Value Date    WBC 8.69 08/20/2024    HGB 11.6 08/20/2024    HCT 36.3 08/20/2024    MCV 94 08/20/2024     08/20/2024    RBC 3.86 08/20/2024    MCH 30.1 08/20/2024    MCHC 32.0 08/20/2024    RDW 12.2 08/20/2024    MPV 9.8 08/20/2024     CMP:   Lab Results   Component Value Date    SODIUM 137 08/20/2024     08/20/2024    CO2 29 08/20/2024    BUN 12 08/20/2024    CREATININE 0.62 " 08/20/2024    CALCIUM 8.3 (L) 08/20/2024    EGFR 96 08/20/2024

## 2024-08-20 NOTE — PLAN OF CARE
Problem: PAIN - ADULT  Goal: Verbalizes/displays adequate comfort level or baseline comfort level  Description: Interventions:  - Encourage patient to monitor pain and request assistance  - Assess pain using appropriate pain scale  - Administer analgesics based on type and severity of pain and evaluate response  - Implement non-pharmacological measures as appropriate and evaluate response  - Consider cultural and social influences on pain and pain management  - Notify physician/advanced practitioner if interventions unsuccessful or patient reports new pain  Outcome: Progressing     Problem: INFECTION - ADULT  Goal: Absence or prevention of progression during hospitalization  Description: INTERVENTIONS:  - Assess and monitor for signs and symptoms of infection  - Monitor lab/diagnostic results  - Monitor all insertion sites, i.e. indwelling lines, tubes, and drains  - Monitor endotracheal if appropriate and nasal secretions for changes in amount and color  - [place] appropriate cooling/warming therapies per order  - Administer medications as ordered  - Instruct and encourage patient and family to use good hand hygiene technique  - Identify and instruct in appropriate isolation precautions for identified infection/condition  Outcome: Progressing     Problem: SAFETY ADULT  Goal: Patient will remain free of falls  Description: INTERVENTIONS:  - Educate patient/family on patient safety including physical limitations  - Instruct patient to call for assistance with activity   - Consult OT/PT to assist with strengthening/mobility   - Keep Call bell within reach  - Keep bed low and locked with side rails adjusted as appropriate  - Keep care items and personal belongings within reach  - Initiate and maintain comfort rounds  - Make Fall Risk Sign visible to staff  - Obtain necessary fall risk management equipment: non skid socks  - Apply yellow socks and bracelet for high fall risk patients  - Consider moving patient to  room near nurses station  Outcome: Progressing  Goal: Maintain or return to baseline ADL function  Description: INTERVENTIONS:  -  Assess patient's ability to carry out ADLs; assess patient's baseline for ADL function and identify physical deficits which impact ability to perform ADLs (bathing, care of mouth/teeth, toileting, grooming, dressing, etc.)  - Assess/evaluate cause of self-care deficits   - Assess range of motion  - Assess patient's mobility; develop plan if impaired  - Assess patient's need for assistive devices and provide as appropriate  - Encourage maximum independence but intervene and supervise when necessary  - Involve family in performance of ADLs  - Assess for home care needs following discharge   - Consider OT consult to assist with ADL evaluation and planning for discharge  - Provide patient education as appropriate  Outcome: Progressing  Goal: Maintains/Returns to pre admission functional level  Description: INTERVENTIONS:  - Perform AM-PAC 6 Click Basic Mobility/ Daily Activity assessment daily.  - Set and communicate daily mobility goal to care team and patient/family/caregiver.   - Collaborate with rehabilitation services on mobility goals if consulted  - Out of bed for toileting  - Record patient progress and toleration of activity level   Outcome: Progressing     Problem: DISCHARGE PLANNING  Goal: Discharge to home or other facility with appropriate resources  Description: INTERVENTIONS:  - Identify barriers to discharge w/patient and caregiver  - Arrange for needed discharge resources and transportation as appropriate  - Identify discharge learning needs (meds, wound care, etc.)  - Arrange for interpretive services to assist at discharge as needed  - Refer to Case Management Department for coordinating discharge planning if the patient needs post-hospital services based on physician/advanced practitioner order or complex needs related to functional status, cognitive ability, or social  support system  Outcome: Progressing     Problem: Knowledge Deficit  Goal: Patient/family/caregiver demonstrates understanding of disease process, treatment plan, medications, and discharge instructions  Description: Complete learning assessment and assess knowledge base.  Interventions:  - Provide teaching at level of understanding  - Provide teaching via preferred learning methods  Outcome: Progressing

## 2024-08-20 NOTE — PROGRESS NOTES
Pastoral Care Progress Note    2024  Patient: Nadia Almanzar : 1960  Admission Date & Time: 2024 1407  MRN: [id number] CSN: [id number]        Fr Crane provided prayer at request of pt.

## 2024-08-20 NOTE — PROGRESS NOTES
[facility]  Progress Note  Name: Nadia Almanzar I  MRN: [id number]  Unit/Bed#: MS 333Carlos I Date of Admission: 8/18/2024   Date of Service: 8/20/2024 I Hospital Day: 2    Assessment & Plan   * Cellulitis  Assessment & Plan  Patient reports that she has had a wound for the last 3 days.  There is been increasing redness, warmth, and tenderness to her right elbow.  This prompted her to be seen in the urgent care on 8/16.  She is now taken 3 doses of prescribed Doxy cyclin and has noticed worsening redness, warmth, and tenderness.  She also had a recorded temperature of 101.4 °F last night.  Patient is immunocompromise secondary to SLE medications.    Does not meet SIRS criteria on admission, afebrile  WBC 11.9 K  Procalcitonin negative  Given one-time dose of ceftriaxone in ED, will transition to Ancef  Will hold SLE medications  Blood culture NG 24 hours   Lactic acid 1.1  Obtain vas duplex upper extremity to rule out DVT - negative  CT right upper extremity: Prominent fluid in the region of the olecranon process of the ulna with peripheral enhancement concerning for infective olecranon bursitis. There is also a prominent elbow joint effusion identified for which infection would not be excluded. No soft tissue emphysema or osseous destructive change identified.  Ortho consulted: continue ancef, elevate right hand, gentle wrist and finger ROM, heat to affected area, no surgery at this time  Continue current treatment plan with IV abx, can transition to oral abx when indicated clinically, once transitioned to oral ortho will see outpatient in 1 week for follow up.   Significant improvement in erythema and swelling at this time    RA (rheumatoid arthritis) ([facility])  Assessment & Plan  Follows pain management  Takes Suboxone secondary to pain, not opioid dependence  We will continue Suboxone and gabapentin    Lupus ([facility])  Assessment & Plan  Takes Plaquenil, prednisone, and Benlysta  injection  Has recently been taking prednisone 30 mg instead of maintenance 5 mg because of a recent flareup  Benlysta injection Received in May, gets injection about every 4 months  Will hold Plaquenil, prednisone, and Benlysta secondary to cellulitic infection    Essential hypertension  Assessment & Plan  BP mildly elevated at 156/96  Takes losartan and amlodipine in the outpatient setting  We will continue with BP meds, patient unsure of amlodipine dosing, will defer amlodipine at this time and add if needed based on BP           VTE Pharmacologic Prophylaxis: VTE Score: 4 Moderate Risk (Score 3-4) - Pharmacological DVT Prophylaxis Contraindicated. Sequential Compression Devices Ordered.    Mobility:   Basic Mobility Inpatient Raw Score: 24  JH-HLM Goal: 8: Walk 250 feet or more  JH-HLM Achieved: 8: Walk 250 feet ot more  JH-HLM Goal achieved. Continue to encourage appropriate mobility.    Patient Centered Rounds: I performed bedside rounds with nursing staff today.   Discussions with Specialists or Other Care Team Provider: nursing, case management    Education and Discussions with Family / Patient: Patient declined call to .     Total Time Spent on Date of Encounter in care of patient:  mins. This time was spent on one or more of the following: performing physical exam; counseling and coordination of care; obtaining or reviewing history; documenting in the medical record; reviewing/ordering tests, medications or procedures; communicating with other healthcare professionals and discussing with patient's family/caregivers.    Current Length of Stay: 2 day(s)  Current Patient Status: Inpatient   Certification Statement: The patient will continue to require additional inpatient hospital stay due to cellulitis, iv abx  Discharge Plan: Anticipate discharge in 24-48 hrs to home.    Code Status: Level 1 - Full Code    Subjective:   Seen and examined today. Patient states she is feeling much better today.  She feels that her arm is less swollen and less red today. She is hopeful that she continues to get better and hopefully get discharged tomorrow.     Objective:     Vitals:   Temp (24hrs), Av.2 °F (36.2 °C), Min:97 °F (36.1 °C), Max:97.4 °F (36.3 °C)    Temp:  [97 °F (36.1 °C)-97.4 °F (36.3 °C)] 97.4 °F (36.3 °C)  HR:  [63-78] 78  Resp:  [18] 18  BP: (122-136)/(80-99) 122/91  SpO2:  [92 %-97 %] 97 %  Body mass index is 25.35 kg/m².     Input and Output Summary (last 24 hours):     Intake/Output Summary (Last 24 hours) at 2024 1717  Last data filed at 2024 1202  Gross per 24 hour   Intake 50 ml   Output 600 ml   Net -550 ml       Physical Exam:   Physical Exam  Vitals reviewed.   Constitutional:       General: She is not in acute distress.     Appearance: She is not ill-appearing or toxic-appearing.   HENT:      Head: Normocephalic and atraumatic.      Mouth/Throat:      Mouth: Mucous membranes are moist.   Cardiovascular:      Rate and Rhythm: Normal rate and regular rhythm.      Heart sounds: No murmur heard.  Pulmonary:      Effort: No respiratory distress.      Breath sounds: No stridor. No wheezing.   Abdominal:      General: Bowel sounds are normal. There is no distension.      Palpations: Abdomen is soft. There is no mass.      Tenderness: There is no abdominal tenderness.   Musculoskeletal:         General: Swelling present.      Right lower leg: No edema.      Left lower leg: No edema.   Skin:     General: Skin is warm and dry.      Findings: Erythema (right arm and elbow spreading down towards the metacarpals with swelling noted (patient states slightly above baseline swelling with her RA). There is area of fluctuation noted posterior area of the right elbow without drainage appreciated - improved) present.   Neurological:      General: No focal deficit present.      Mental Status: She is alert and oriented to person, place, and time.   Psychiatric:         Mood and Affect: Mood normal.          Behavior: Behavior normal.          Additional Data:     Labs:  Results from last 7 days   Lab Units 08/20/24  0604 08/19/24  0528   WBC Thousand/uL 8.69 11.96*   HEMOGLOBIN g/dL 11.6 10.9*   HEMATOCRIT % 36.3 33.8*   PLATELETS Thousands/uL 278 248   SEGS PCT %  --  73   LYMPHO PCT %  --  15   MONO PCT %  --  11   EOS PCT %  --  1     Results from last 7 days   Lab Units 08/20/24  0604 08/19/24  0528 08/18/24  1418   SODIUM mmol/L 137   < > 136   POTASSIUM mmol/L 4.1   < > 3.6   CHLORIDE mmol/L 104   < > 102   CO2 mmol/L 29   < > 27   BUN mg/dL 12   < > 13   CREATININE mg/dL 0.62   < > 0.70   ANION GAP mmol/L 4   < > 7   CALCIUM mg/dL 8.3*   < > 9.2   ALBUMIN g/dL  --   --  4.1   TOTAL BILIRUBIN mg/dL  --   --  1.32*   ALK PHOS U/L  --   --  70   ALT U/L  --   --  13   AST U/L  --   --  14   GLUCOSE RANDOM mg/dL 89   < > 98    < > = values in this interval not displayed.                 Results from last 7 days   Lab Units 08/18/24  1418   LACTIC ACID mmol/L 1.1   PROCALCITONIN ng/ml <0.05       Lines/Drains:  Invasive Devices       Peripheral Intravenous Line  Duration             Peripheral IV 08/20/24 Left;Ventral (anterior) Hand <1 day                  Imaging: Reviewed radiology reports from this admission including: ultrasound(s)    Recent Cultures (last 7 days):   Results from last 7 days   Lab Units 08/18/24  1418   BLOOD CULTURE  No Growth at 24 hrs.       Last 24 Hours Medication List:   Current Facility-Administered Medications   Medication Dose Route Frequency Provider Last Rate    acetaminophen  650 mg Oral Q6H PRN Gilson Marvin Mchugh PA-C      buprenorphine-naloxone  8 mg Sublingual Daily PRN Gilson Marvin Mchugh PA-C      cefazolin  1,000 mg Intravenous Q8H Gilson Marvin Mchugh PA-C 1,000 mg (08/20/24 1202)    gabapentin  600 mg Oral Q8H Gilson Marvin Mchugh PA-C      ketorolac  15 mg Intravenous Q6H PRN Amira Graham PA-C      losartan  50 mg Oral Daily Gilson Mchugh PA-C      ondansetron   4 mg Intravenous Q6H PRN Gilson Mchugh PA-C          Today, Patient Was Seen By: Amira Graham PA-C    **Please Note: This note may have been constructed using a voice recognition system.**

## 2024-08-21 VITALS
DIASTOLIC BLOOD PRESSURE: 89 MMHG | BODY MASS INDEX: 25.35 KG/M2 | SYSTOLIC BLOOD PRESSURE: 133 MMHG | HEART RATE: 75 BPM | RESPIRATION RATE: 18 BRPM | TEMPERATURE: 95.4 F | OXYGEN SATURATION: 93 % | WEIGHT: 143.08 LBS | HEIGHT: 63 IN

## 2024-08-21 LAB
ANION GAP SERPL CALCULATED.3IONS-SCNC: 6 MMOL/L (ref 4–13)
BUN SERPL-MCNC: 13 MG/DL (ref 5–25)
CALCIUM SERPL-MCNC: 8.4 MG/DL (ref 8.4–10.2)
CHLORIDE SERPL-SCNC: 103 MMOL/L (ref 96–108)
CO2 SERPL-SCNC: 27 MMOL/L (ref 21–32)
CREAT SERPL-MCNC: 0.55 MG/DL (ref 0.6–1.3)
ERYTHROCYTE [DISTWIDTH] IN BLOOD BY AUTOMATED COUNT: 11.9 % (ref 11.6–15.1)
GFR SERPL CREATININE-BSD FRML MDRD: 100 ML/MIN/1.73SQ M
GLUCOSE SERPL-MCNC: 86 MG/DL (ref 65–140)
HCT VFR BLD AUTO: 36.7 % (ref 34.8–46.1)
HGB BLD-MCNC: 11.9 G/DL (ref 11.5–15.4)
MCH RBC QN AUTO: 30.1 PG (ref 26.8–34.3)
MCHC RBC AUTO-ENTMCNC: 32.4 G/DL (ref 31.4–37.4)
MCV RBC AUTO: 93 FL (ref 82–98)
PLATELET # BLD AUTO: 295 THOUSANDS/UL (ref 149–390)
PMV BLD AUTO: 9.8 FL (ref 8.9–12.7)
POTASSIUM SERPL-SCNC: 4.4 MMOL/L (ref 3.5–5.3)
RBC # BLD AUTO: 3.96 MILLION/UL (ref 3.81–5.12)
SODIUM SERPL-SCNC: 136 MMOL/L (ref 135–147)
WBC # BLD AUTO: 7.82 THOUSAND/UL (ref 4.31–10.16)

## 2024-08-21 PROCEDURE — 80048 BASIC METABOLIC PNL TOTAL CA: CPT

## 2024-08-21 PROCEDURE — 87081 CULTURE SCREEN ONLY: CPT

## 2024-08-21 PROCEDURE — 87147 CULTURE TYPE IMMUNOLOGIC: CPT

## 2024-08-21 PROCEDURE — 85027 COMPLETE CBC AUTOMATED: CPT

## 2024-08-21 PROCEDURE — 99231 SBSQ HOSP IP/OBS SF/LOW 25: CPT | Performed by: ORTHOPAEDIC SURGERY

## 2024-08-21 PROCEDURE — 99239 HOSP IP/OBS DSCHRG MGMT >30: CPT

## 2024-08-21 RX ORDER — CEPHALEXIN 500 MG/1
500 CAPSULE ORAL EVERY 6 HOURS SCHEDULED
Qty: 48 CAPSULE | Refills: 0 | Status: SHIPPED | OUTPATIENT
Start: 2024-08-21 | End: 2024-09-02

## 2024-08-21 RX ORDER — PREDNISONE 5 MG/1
10 TABLET ORAL DAILY
Start: 2024-09-02

## 2024-08-21 RX ORDER — FUROSEMIDE 10 MG/ML
40 INJECTION INTRAMUSCULAR; INTRAVENOUS ONCE
Status: COMPLETED | OUTPATIENT
Start: 2024-08-21 | End: 2024-08-21

## 2024-08-21 RX ORDER — HYDROXYCHLOROQUINE SULFATE 200 MG/1
200 TABLET, FILM COATED ORAL DAILY
Start: 2024-09-02 | End: 2024-10-02

## 2024-08-21 RX ORDER — KETOROLAC TROMETHAMINE 10 MG/1
10 TABLET, FILM COATED ORAL EVERY 6 HOURS PRN
Qty: 20 TABLET | Refills: 0 | Status: SHIPPED | OUTPATIENT
Start: 2024-08-21

## 2024-08-21 RX ADMIN — CEFAZOLIN SODIUM 1000 MG: 1 SOLUTION INTRAVENOUS at 11:10

## 2024-08-21 RX ADMIN — FUROSEMIDE 40 MG: 10 INJECTION, SOLUTION INTRAMUSCULAR; INTRAVENOUS at 11:10

## 2024-08-21 RX ADMIN — LOSARTAN POTASSIUM 50 MG: 50 TABLET, FILM COATED ORAL at 08:40

## 2024-08-21 RX ADMIN — KETOROLAC TROMETHAMINE 15 MG: 30 INJECTION, SOLUTION INTRAMUSCULAR; INTRAVENOUS at 06:27

## 2024-08-21 RX ADMIN — GABAPENTIN 600 MG: 300 CAPSULE ORAL at 08:40

## 2024-08-21 RX ADMIN — CEFAZOLIN SODIUM 1000 MG: 1 SOLUTION INTRAVENOUS at 05:06

## 2024-08-21 RX ADMIN — GABAPENTIN 600 MG: 300 CAPSULE ORAL at 00:17

## 2024-08-21 NOTE — ASSESSMENT & PLAN NOTE
Takes Plaquenil, prednisone, and Benlysta injection  Has recently been taking prednisone 30 mg instead of maintenance 5 mg because of a recent flareup  Benlysta injection Received in May, gets injection about every 4 months  Will hold Plaquenil, prednisone, and Benlysta secondary to cellulitic infection  Continue to hold on discharge - may resume after completion of antibiotics   Alar Island Pedicle Flap Text: The defect edges were debeveled with a #15 scalpel blade.  Given the location of the defect, shape of the defect and the proximity to the alar rim an island pedicle advancement flap was deemed most appropriate.  Using a sterile surgical marker, an appropriate advancement flap was drawn incorporating the defect, outlining the appropriate donor tissue and placing the expected incisions within the nasal ala running parallel to the alar rim. The area thus outlined was incised with a #15 scalpel blade.  The skin margins were undermined minimally to an appropriate distance in all directions around the primary defect and laterally outward around the island pedicle utilizing iris scissors.  There was minimal undermining beneath the pedicle flap.

## 2024-08-21 NOTE — PLAN OF CARE
Problem: PAIN - ADULT  Goal: Verbalizes/displays adequate comfort level or baseline comfort level  Description: Interventions:  - Encourage patient to monitor pain and request assistance  - Assess pain using appropriate pain scale  - Administer analgesics based on type and severity of pain and evaluate response  - Implement non-pharmacological measures as appropriate and evaluate response  - Consider cultural and social influences on pain and pain management  - Notify physician/advanced practitioner if interventions unsuccessful or patient reports new pain  Outcome: Progressing     Problem: INFECTION - ADULT  Goal: Absence or prevention of progression during hospitalization  Description: INTERVENTIONS:  - Assess and monitor for signs and symptoms of infection  - Monitor lab/diagnostic results  - Monitor all insertion sites, i.e. indwelling lines, tubes, and drains  - Monitor endotracheal if appropriate and nasal secretions for changes in amount and color  - Sullivan appropriate cooling/warming therapies per order  - Administer medications as ordered  - Instruct and encourage patient and family to use good hand hygiene technique  - Identify and instruct in appropriate isolation precautions for identified infection/condition  Outcome: Progressing     Problem: SAFETY ADULT  Goal: Patient will remain free of falls  Description: INTERVENTIONS:  - Educate patient/family on patient safety including physical limitations  - Instruct patient to call for assistance with activity   - Consult OT/PT to assist with strengthening/mobility   - Keep Call bell within reach  - Keep bed low and locked with side rails adjusted as appropriate  - Keep care items and personal belongings within reach  - Initiate and maintain comfort rounds  - Make Fall Risk Sign visible to staff  - Obtain necessary fall risk management equipment: non skid socks  - Apply yellow socks and bracelet for high fall risk patients  - Consider moving patient to  room near nurses station  Outcome: Progressing  Goal: Maintain or return to baseline ADL function  Description: INTERVENTIONS:  -  Assess patient's ability to carry out ADLs; assess patient's baseline for ADL function and identify physical deficits which impact ability to perform ADLs (bathing, care of mouth/teeth, toileting, grooming, dressing, etc.)  - Assess/evaluate cause of self-care deficits   - Assess range of motion  - Assess patient's mobility; develop plan if impaired  - Assess patient's need for assistive devices and provide as appropriate  - Encourage maximum independence but intervene and supervise when necessary  - Involve family in performance of ADLs  - Assess for home care needs following discharge   - Consider OT consult to assist with ADL evaluation and planning for discharge  - Provide patient education as appropriate  Outcome: Progressing  Goal: Maintains/Returns to pre admission functional level  Description: INTERVENTIONS:  - Perform AM-PAC 6 Click Basic Mobility/ Daily Activity assessment daily.  - Set and communicate daily mobility goal to care team and patient/family/caregiver.   - Collaborate with rehabilitation services on mobility goals if consulted  - Out of bed for toileting  - Record patient progress and toleration of activity level   Outcome: Progressing     Problem: DISCHARGE PLANNING  Goal: Discharge to home or other facility with appropriate resources  Description: INTERVENTIONS:  - Identify barriers to discharge w/patient and caregiver  - Arrange for needed discharge resources and transportation as appropriate  - Identify discharge learning needs (meds, wound care, etc.)  - Arrange for interpretive services to assist at discharge as needed  - Refer to Case Management Department for coordinating discharge planning if the patient needs post-hospital services based on physician/advanced practitioner order or complex needs related to functional status, cognitive ability, or social  support system  Outcome: Progressing     Problem: Knowledge Deficit  Goal: Patient/family/caregiver demonstrates understanding of disease process, treatment plan, medications, and discharge instructions  Description: Complete learning assessment and assess knowledge base.  Interventions:  - Provide teaching at level of understanding  - Provide teaching via preferred learning methods  Outcome: Progressing

## 2024-08-21 NOTE — DISCHARGE SUMMARY
[facility]  Discharge- Nadia Almanzar 1960, 64 y.o. female MRN: [id number]  Unit/Bed#: MS [facility] Encounter: [id number]  Primary Care Provider: Rubin Mendes MD   Date and time admitted to hospital: 8/18/2024  2:07 PM    * Cellulitis  Assessment & Plan  Patient reports that she has had a wound for the last 3 days.  There is been increasing redness, warmth, and tenderness to her right elbow.  This prompted her to be seen in the urgent care on 8/16.  She is now taken 3 doses of prescribed Doxy cyclin and has noticed worsening redness, warmth, and tenderness.  She also had a recorded temperature of 101.4 °F last night.  Patient is immunocompromise secondary to SLE medications.    Does not meet SIRS criteria on admission, afebrile  WBC 11.9 K  Procalcitonin negative  Given one-time dose of ceftriaxone in ED, will transition to Ancef  Will hold SLE medications  Blood culture NG 48 hours   Lactic acid 1.1  Obtain vas duplex upper extremity to rule out DVT - negative  CT right upper extremity: Prominent fluid in the region of the olecranon process of the ulna with peripheral enhancement concerning for infective olecranon bursitis. There is also a prominent elbow joint effusion identified for which infection would not be excluded. No soft tissue emphysema or osseous destructive change identified.  Ortho consulted: continue ancef, elevate right hand, gentle wrist and finger ROM, heat to affected area, no surgery at this time  Continue current treatment plan with IV abx, can transition to oral abx when indicated clinically, once transitioned to oral ortho will see outpatient in 1 week for follow up.   Significant improvement in erythema and swelling at this time  Curbside ID, due to extent of edema, would give 1 dose of lasix and recommend checking nasal swab for MRSA. If positive for MRSA, can follow up with PCP for nasal mupirocin , chlorhexidine baths and bleaching house surfaces  /laundering. Would recommend 1-2 days with a compression wrap, limb elevation, dose of lasix. Did discuss with patient regarding observing possibly 1 more day per ID recommendations, but she would prefer to go home and return if anything were to change.   On discharge, can follow up with PCP in 1 week, can follow up with orthopedics as needed. Will continue with antibiotics on discharge, keflex 500 mg qid x 12 days to complete total of 14 days of antibiotics    RA (rheumatoid arthritis) ([facility])  Assessment & Plan  Follows pain management  Takes Suboxone secondary to pain, not opioid dependence  We will continue Suboxone and gabapentin    Lupus ([facility])  Assessment & Plan  Takes Plaquenil, prednisone, and Benlysta injection  Has recently been taking prednisone 30 mg instead of maintenance 5 mg because of a recent flareup  Benlysta injection Received in May, gets injection about every 4 months  Will hold Plaquenil, prednisone, and Benlysta secondary to cellulitic infection  Continue to hold on discharge - may resume after completion of antibiotics    Essential hypertension  Assessment & Plan  BP mildly elevated at 156/96  Takes losartan and amlodipine in the outpatient setting  We will continue with BP meds, patient unsure of amlodipine dosing, will defer amlodipine at this time and add if needed based on BP  Continue BP meds on discharge      Medical Problems       Resolved Problems  Date Reviewed: 8/21/2024   None       Discharging Physician / Practitioner: Amira Graham PA-C  PCP: Rubin Mendes MD  Admission Date:   Admission Orders (From admission, onward)       Ordered        08/18/24 2993  INPATIENT ADMISSION  Once                          Discharge Date: 08/21/24    Consultations During Hospital Stay:  Orthopedics    Procedures Performed:   none    Significant Findings / Test Results:   VAS upper limb venous duplex scan, unilateral/limited   Final Result by Babar Ledesma MD (08/19 0229)       CT upper extremity w contrast right   Final Result by Angel Yarbrough MD (08/18 1731)   Prominent fluid in the region of the olecranon process of the ulna with peripheral enhancement concerning for infective olecranon bursitis. There is also a prominent elbow joint effusion identified for which infection would not be excluded. No soft    tissue emphysema or osseous destructive change identified.      The study was marked in EPIC for immediate notification.            Workstation performed: [id number]           BC NG 48 hours  WBC: 11.99 > 11.96 > 8.69 > 7.82  Procal negative  Lactic normal    Incidental Findings:   none     Test Results Pending at Discharge (will require follow up):   none     Outpatient Tests Requested:  Follow up with PCP in 1 week  Can follow up with orthopedics outpatient if needed  Continue outpatient follow up with rheumatology and pain management  Repeat CBC and BMP in 1 week with PCP    Complications:  none    Reason for Admission: cellulitis RUE    Hospital Course:   Nadia Almanzar is a 64 y.o. female patient who originally presented to the hospital on 8/18/2024 due to cellulitis RUE. Noted to have worsening erythema and swelling and admitted for IV abx. Started on IV ancef and CT ordered. CT results above and orthopedics consulted. Recommended continuing with IV ancef and elevation of right hand and heat to area. No surgical intervention. Swelling and erythema continued to improve. Curbside ID prior to discharge regarding any MRSA coverage, no MRSA coverage needed as patient didn't improve on doxy prior to admission. MRSA nasal swab ordered prior to discharge and given 1 dose IV lasix. Needs to continue with compression wrap, limb elevation, and warm compresses to the area. Will continue with keflex 500 mg four times daily for 12 more days for total of 14 days of antibiotic coverage on discharge. Instructed to return if she has any new or worsening symptoms. Should follow up with PCP in 1  "week. Follow up with orthopedics outpatient. Holding on RA medications until completion of abx unless otherwise directed by her rheumatologist. Patient verbalized understanding and agreement to plans above.     Please see above list of diagnoses and related plan for additional information.     Condition at Discharge: fair    Discharge Day Visit / Exam:   Subjective:  Seen and examined today. Said she is feeling much better. She said the swelling in the arm and redness has significantly improved since she was admitted. She said that she can bend her fingers again and that she is able to see her elbow which she couldn't before from the swelling. Minimal pain in the arm. Looking forward to going home.   Vitals: Blood Pressure: 133/89 (08/21/24 0843)  Pulse: 75 (08/21/24 0843)  Temperature: (!) 95.4 °F (35.2 °C) (08/21/24 0650)  Temp Source: Temporal (08/19/24 1950)  Respirations: 18 (08/21/24 0650)  Height: 5' 3\" (160 cm) (08/18/24 1703)  Weight - Scale: 64.9 kg (143 lb 1.3 oz) (08/18/24 1703)  SpO2: 93 % (08/21/24 0800)  Exam:   Physical Exam  Vitals reviewed.   Constitutional:       General: She is not in acute distress.     Appearance: She is not ill-appearing or toxic-appearing.   HENT:      Head: Normocephalic and atraumatic.      Mouth/Throat:      Mouth: Mucous membranes are moist.   Cardiovascular:      Rate and Rhythm: Normal rate and regular rhythm.      Heart sounds: No murmur heard.  Pulmonary:      Effort: No respiratory distress.      Breath sounds: No stridor. No wheezing, rhonchi or rales.   Abdominal:      General: Bowel sounds are normal.      Palpations: Abdomen is soft.   Musculoskeletal:         General: Swelling (significantly improved, still some residual swelling of the right elbow but swelling of proximal and distal right upper extremity improved) present.      Right lower leg: No edema.      Left lower leg: No edema.   Skin:     General: Skin is warm and dry.      Findings: Erythema " (significantly improved of the RUE) present.   Neurological:      General: No focal deficit present.      Mental Status: She is alert and oriented to person, place, and time.   Psychiatric:         Mood and Affect: Mood normal.         Behavior: Behavior normal.          Discussion with Family: Updated  () at bedside.    Discharge instructions/Information to patient and family:   See after visit summary for information provided to patient and family.      Provisions for Follow-Up Care:  See after visit summary for information related to follow-up care and any pertinent home health orders.      Mobility at time of Discharge:   Basic Mobility Inpatient Raw Score: 24  JH-HLM Goal: 8: Walk 250 feet or more  JH-HLM Achieved: 8: Walk 250 feet ot more  HLM Goal achieved. Continue to encourage appropriate mobility.     Disposition:   Home    Planned Readmission: no     Discharge Statement:  I spent 48 minutes discharging the patient. This time was spent on the day of discharge. I had direct contact with the patient on the day of discharge. Greater than 50% of the total time was spent examining patient, answering all patient questions, arranging and discussing plan of care with patient as well as directly providing post-discharge instructions.  Additional time then spent on discharge activities.    Discharge Medications:  See after visit summary for reconciled discharge medications provided to patient and/or family.      **Please Note: This note may have been constructed using a voice recognition system**

## 2024-08-21 NOTE — PLAN OF CARE
Problem: PAIN - ADULT  Goal: Verbalizes/displays adequate comfort level or baseline comfort level  Description: Interventions:  - Encourage patient to monitor pain and request assistance  - Assess pain using appropriate pain scale  - Administer analgesics based on type and severity of pain and evaluate response  - Implement non-pharmacological measures as appropriate and evaluate response  - Consider cultural and social influences on pain and pain management  - Notify physician/advanced practitioner if interventions unsuccessful or patient reports new pain  8/21/2024 1340 by Fatemeh Dudley RN  Outcome: Adequate for Discharge  8/21/2024 1340 by Fatemeh Dudley RN  Outcome: Progressing  8/21/2024 1017 by Fatemeh Dudley RN  Outcome: Progressing     Problem: INFECTION - ADULT  Goal: Absence or prevention of progression during hospitalization  Description: INTERVENTIONS:  - Assess and monitor for signs and symptoms of infection  - Monitor lab/diagnostic results  - Monitor all insertion sites, i.e. indwelling lines, tubes, and drains  - Monitor endotracheal if appropriate and nasal secretions for changes in amount and color  - [place] appropriate cooling/warming therapies per order  - Administer medications as ordered  - Instruct and encourage patient and family to use good hand hygiene technique  - Identify and instruct in appropriate isolation precautions for identified infection/condition  8/21/2024 1340 by Fatemeh Dudley RN  Outcome: Adequate for Discharge  8/21/2024 1340 by Fatemeh Dudley RN  Outcome: Progressing  8/21/2024 1017 by Fatemeh Dudley RN  Outcome: Progressing     Problem: SAFETY ADULT  Goal: Patient will remain free of falls  Description: INTERVENTIONS:  - Educate patient/family on patient safety including physical limitations  - Instruct patient to call for assistance with activity   - Consult OT/PT to assist with strengthening/mobility   - Keep Call bell within reach  - Keep bed low and locked with side rails adjusted  as appropriate  - Keep care items and personal belongings within reach  - Initiate and maintain comfort rounds  - Make Fall Risk Sign visible to staff  - Obtain necessary fall risk management equipment: non skid socks  - Apply yellow socks and bracelet for high fall risk patients  - Consider moving patient to room near nurses station  8/21/2024 1340 by Fatemeh Dudley RN  Outcome: Adequate for Discharge  8/21/2024 1340 by Fatemeh Dudley RN  Outcome: Progressing  8/21/2024 1017 by Fatemeh Dudley RN  Outcome: Progressing  Goal: Maintain or return to baseline ADL function  Description: INTERVENTIONS:  -  Assess patient's ability to carry out ADLs; assess patient's baseline for ADL function and identify physical deficits which impact ability to perform ADLs (bathing, care of mouth/teeth, toileting, grooming, dressing, etc.)  - Assess/evaluate cause of self-care deficits   - Assess range of motion  - Assess patient's mobility; develop plan if impaired  - Assess patient's need for assistive devices and provide as appropriate  - Encourage maximum independence but intervene and supervise when necessary  - Involve family in performance of ADLs  - Assess for home care needs following discharge   - Consider OT consult to assist with ADL evaluation and planning for discharge  - Provide patient education as appropriate  8/21/2024 1340 by Fatemeh Dudley RN  Outcome: Adequate for Discharge  8/21/2024 1340 by Fatemeh Dudley RN  Outcome: Progressing  8/21/2024 1017 by Fatemeh Dudley RN  Outcome: Progressing  Goal: Maintains/Returns to pre admission functional level  Description: INTERVENTIONS:  - Perform AM-PAC 6 Click Basic Mobility/ Daily Activity assessment daily.  - Set and communicate daily mobility goal to care team and patient/family/caregiver.   - Collaborate with rehabilitation services on mobility goals if consulted  - Out of bed for toileting  - Record patient progress and toleration of activity level   8/21/2024 1340 by Fatemeh Dudley  RN  Outcome: Adequate for Discharge  8/21/2024 1340 by Fatemeh Dudley RN  Outcome: Progressing  8/21/2024 1017 by Fatemeh Dudley RN  Outcome: Progressing     Problem: DISCHARGE PLANNING  Goal: Discharge to home or other facility with appropriate resources  Description: INTERVENTIONS:  - Identify barriers to discharge w/patient and caregiver  - Arrange for needed discharge resources and transportation as appropriate  - Identify discharge learning needs (meds, wound care, etc.)  - Arrange for interpretive services to assist at discharge as needed  - Refer to Case Management Department for coordinating discharge planning if the patient needs post-hospital services based on physician/advanced practitioner order or complex needs related to functional status, cognitive ability, or social support system  8/21/2024 1340 by Fatemeh Dudley RN  Outcome: Adequate for Discharge  8/21/2024 1340 by Fatemeh Dudley RN  Outcome: Progressing  8/21/2024 1017 by Fatemeh Dudley RN  Outcome: Progressing     Problem: Knowledge Deficit  Goal: Patient/family/caregiver demonstrates understanding of disease process, treatment plan, medications, and discharge instructions  Description: Complete learning assessment and assess knowledge base.  Interventions:  - Provide teaching at level of understanding  - Provide teaching via preferred learning methods  8/21/2024 1340 by Fatemeh Dudley RN  Outcome: Adequate for Discharge  8/21/2024 1340 by Fatemeh Dudley RN  Outcome: Progressing  8/21/2024 1017 by Fatemeh Dudley RN  Outcome: Progressing

## 2024-08-21 NOTE — DISCHARGE INSTR - AVS FIRST PAGE
Chart(s)/Patient Dear Nadia Almanzar,     It was our pleasure to care for you here at [facility]. For follow up as well as any medication refills, we recommend that you follow up with your primary care physician. Here are the most important instructions/ recommendations at discharge:     Notable Medication Adjustments -   Start taking keflex 500 mg four times a day for 12 days  Hold you prednisone and Plaquenil at this time - may resume when you complete antibiotics  Can start taking toradol for pain  Testing Required after Discharge -   Repeat CBC and BMP in 1 week with PCP  Important follow up information -   Follow up with PCP in 1 week  Can follow up with orthopedics outpatient if needed   Can follow up with rheumatology and pain management as previously recommended  Other Instructions -   Continue taking medications as prescribed. Please continue with elevation of the right arm and warm compresses.   Please continue with compression wrapping on discharge in addition to the limb elevation.   Will swab you for MRSA, if you come back positive, please follow up with your PCP regarding decolonization with nasal mupirocin , chlorhexidine baths and bleaching house surfaces /laundering.   Due to the extent of tissue edema swelling, there is a high chance of relapse of infection on oral antibiotics, it is very important to continue with a compression wrap, limb elevation, warm compresses to the area.   Please review this entire after visit summary as additional general instructions including medication list, appointments, activity, diet, any pertinent wound care, and other additional recommendations from your care team that may be provided for you.      Sincerely,     Amira Graham PA-C

## 2024-08-21 NOTE — PROGRESS NOTES
"Progress Note - Orthopedics   Nadia Almanzar 64 y.o. female MRN: [id number]  Unit/Bed#: -01 Encounter: [id number]    Assessment:  Cellulitis right upper extremity    Plan:  This patient is orthopedically stable for discharge.  Outpatient follow-up with her family physician or with me would be appropriate.  Transitioning to oral antibiotics is reasonable plan at this time with discharge home if felt to be medically stable.    Weight bearing: WBAT        Subjective: Nadia notes continued improvement in swelling and pain.  She is comfortable at this time and anxious to go home.    Vitals: Blood pressure 133/89, pulse 75, temperature (!) 95.4 °F (35.2 °C), resp. rate 18, height 5' 3\" (1.6 m), weight 64.9 kg (143 lb 1.3 oz), SpO2 93%.,Body mass index is 25.35 kg/m².      Intake/Output Summary (Last 24 hours) at 8/21/2024 0932  Last data filed at 8/21/2024 0843  Gross per 24 hour   Intake 410 ml   Output --   Net 410 ml       Invasive Devices       Peripheral Intravenous Line  Duration             Peripheral IV 08/20/24 Left;Ventral (anterior) Hand <1 day                    Physical Exam: The right upper extremity exam demonstrates notably decreased swelling.  She continues to demonstrate a punctate area of localized swelling over the dorsal aspect of the forearm without drainage.  There is no surrounding erythema or rubor.  She has excellent range of motion of the elbow, forearm, wrist and hand without complaints.    Lab, Imaging and other studies: CBC:   Lab Results   Component Value Date    WBC 7.82 08/21/2024    HGB 11.9 08/21/2024    HCT 36.7 08/21/2024    MCV 93 08/21/2024     08/21/2024    RBC 3.96 08/21/2024    MCH 30.1 08/21/2024    MCHC 32.4 08/21/2024    RDW 11.9 08/21/2024    MPV 9.8 08/21/2024         "

## 2024-08-21 NOTE — ASSESSMENT & PLAN NOTE
Patient reports that she has had a wound for the last 3 days.  There is been increasing redness, warmth, and tenderness to her right elbow.  This prompted her to be seen in the urgent care on 8/16.  She is now taken 3 doses of prescribed Doxy cyclin and has noticed worsening redness, warmth, and tenderness.  She also had a recorded temperature of 101.4 °F last night.  Patient is immunocompromise secondary to SLE medications.    Does not meet SIRS criteria on admission, afebrile  WBC 11.9 K  Procalcitonin negative  Given one-time dose of ceftriaxone in ED, will transition to Ancef  Will hold SLE medications  Blood culture NG 48 hours   Lactic acid 1.1  Obtain vas duplex upper extremity to rule out DVT - negative  CT right upper extremity: Prominent fluid in the region of the olecranon process of the ulna with peripheral enhancement concerning for infective olecranon bursitis. There is also a prominent elbow joint effusion identified for which infection would not be excluded. No soft tissue emphysema or osseous destructive change identified.  Ortho consulted: continue ancef, elevate right hand, gentle wrist and finger ROM, heat to affected area, no surgery at this time  Continue current treatment plan with IV abx, can transition to oral abx when indicated clinically, once transitioned to oral ortho will see outpatient in 1 week for follow up.   Significant improvement in erythema and swelling at this time  Curbside ID, due to extent of edema, would give 1 dose of lasix and recommend checking nasal swab for MRSA. If positive for MRSA, can follow up with PCP for nasal mupirocin , chlorhexidine baths and bleaching house surfaces /laundering. Would recommend 1-2 days with a compression wrap, limb elevation, dose of lasix.   On discharge, can follow up with PCP in 1 week, can follow up with orthopedics as needed. Will continue with antibiotics on discharge, keflex 500 mg qid x 12 days to complete total of 14 days of  antibiotics   No

## 2024-08-21 NOTE — ASSESSMENT & PLAN NOTE
Patient reports that she has had a wound for the last 3 days.  There is been increasing redness, warmth, and tenderness to her right elbow.  This prompted her to be seen in the urgent care on 8/16.  She is now taken 3 doses of prescribed Doxy cyclin and has noticed worsening redness, warmth, and tenderness.  She also had a recorded temperature of 101.4 °F last night.  Patient is immunocompromise secondary to SLE medications.    Does not meet SIRS criteria on admission, afebrile  WBC 11.9 K  Procalcitonin negative  Given one-time dose of ceftriaxone in ED, will transition to Ancef  Will hold SLE medications  Blood culture NG 48 hours   Lactic acid 1.1  Obtain vas duplex upper extremity to rule out DVT - negative  CT right upper extremity: Prominent fluid in the region of the olecranon process of the ulna with peripheral enhancement concerning for infective olecranon bursitis. There is also a prominent elbow joint effusion identified for which infection would not be excluded. No soft tissue emphysema or osseous destructive change identified.  Ortho consulted: continue ancef, elevate right hand, gentle wrist and finger ROM, heat to affected area, no surgery at this time  Continue current treatment plan with IV abx, can transition to oral abx when indicated clinically, once transitioned to oral ortho will see outpatient in 1 week for follow up.   Significant improvement in erythema and swelling at this time  Curbside ID, due to extent of edema, would give 1 dose of lasix and recommend checking nasal swab for MRSA. If positive for MRSA, can follow up with PCP for nasal mupirocin , chlorhexidine baths and bleaching house surfaces /laundering. Would recommend 1-2 days with a compression wrap, limb elevation, dose of lasix. Did discuss with patient regarding observing possibly 1 more day per ID recommendations, but she would prefer to go home and return if anything were to change.   On discharge, can follow up with PCP in 1  week, can follow up with orthopedics as needed. Will continue with antibiotics on discharge, keflex 500 mg qid x 12 days to complete total of 14 days of antibiotics

## 2024-08-21 NOTE — NURSING NOTE
Kirk Graham made aware that patient reported, yesterday, that some of her family members had a red spot also with redness and swelling. Her grandson had one on his foot. She said they mentioned that it may be MRSA, but did not culture his. He was on doxycycline. Her  had one on his arm, and required an antibiotic too. And her son had an area on his hip recently. Her son is cleaning the house with clorox. They are all going to wash with hibiclens for a while, and at clorox to the wash.

## 2024-08-21 NOTE — ASSESSMENT & PLAN NOTE
Takes Plaquenil, prednisone, and Benlysta injection  Has recently been taking prednisone 30 mg instead of maintenance 5 mg because of a recent flareup  Benlysta injection Received in May, gets injection about every 4 months  Will hold Plaquenil, prednisone, and Benlysta secondary to cellulitic infection  Continue to hold on discharge - may resume after completion of antibiotics

## 2024-08-21 NOTE — ASSESSMENT & PLAN NOTE
BP mildly elevated at 156/96  Takes losartan and amlodipine in the outpatient setting  We will continue with BP meds, patient unsure of amlodipine dosing, will defer amlodipine at this time and add if needed based on BP  Continue BP meds on discharge

## 2024-08-22 LAB
MRSA NOSE QL CULT: ABNORMAL
MRSA NOSE QL CULT: ABNORMAL

## 2024-08-22 NOTE — UTILIZATION REVIEW
NOTIFICATION OF ADMISSION DISCHARGE   This is a Notification of Discharge from [facility]. Please be advised that this patient has been discharge from our facility. Below you will find the admission and discharge date and time including the patient’s disposition.   UTILIZATION REVIEW CONTACT:  Meghana Hernández  Utilization   Network Utilization Review Department  Phone: [phone] x carefully listen to the prompts. All voicemails are confidential.  Email: [email or web address]     ADMISSION INFORMATION  PRESENTATION DATE: 8/18/2024  2:07 PM  OBERVATION ADMISSION DATE: N/A  INPATIENT ADMISSION DATE: 8/18/24  2:53 PM   DISCHARGE DATE: 8/21/2024  1:42 PM   DISPOSITION:Home/Self Care    Network Utilization Review Department  ATTENTION: Please call with any questions or concerns to [phone] and carefully listen to the prompts so that you are directed to the right person. All voicemails are confidential.   For Discharge needs, contact Care Management DC Support Team at [phone] opt. 2  Send all requests for admission clinical reviews, approved or denied determinations and any other requests to dedicated fax number below belonging to the campus where the patient is receiving treatment. List of dedicated fax numbers for the Facilities:  FACILITY NAME UR FAX NUMBER   ADMISSION DENIALS (Administrative/Medical Necessity) [phone]   DISCHARGE SUPPORT TEAM ([facility]) [phone]   PARENT CHILD HEALTH (Maternity/NICU/Pediatrics) [phone]   [facility] [phone]   [facility] [phone]   [facility] [phone]   [facility] [phone]   [facility] [phone]   [facility] [phone]   [facility] [phone]   [facility]  [phone]   [facility] [phone]   Critical access hospital [phone]   [facility] [phone]   [facility] [phone]

## 2024-08-24 LAB — BACTERIA BLD CULT: NORMAL

## 2024-11-21 ENCOUNTER — HOSPITAL ENCOUNTER (EMERGENCY)
Facility: HOSPITAL | Age: 64
Discharge: HOME | End: 2024-11-21
Attending: EMERGENCY MEDICINE
Payer: COMMERCIAL

## 2024-11-21 ENCOUNTER — APPOINTMENT (EMERGENCY)
Dept: RADIOLOGY | Facility: HOSPITAL | Age: 64
End: 2024-11-21
Payer: COMMERCIAL

## 2024-11-21 ENCOUNTER — APPOINTMENT (EMERGENCY)
Dept: CT IMAGING | Facility: HOSPITAL | Age: 64
End: 2024-11-21
Payer: COMMERCIAL

## 2024-11-21 VITALS
BODY MASS INDEX: 27.48 KG/M2 | DIASTOLIC BLOOD PRESSURE: 84 MMHG | HEIGHT: 60 IN | OXYGEN SATURATION: 100 % | TEMPERATURE: 97.6 F | HEART RATE: 52 BPM | WEIGHT: 140 LBS | RESPIRATION RATE: 16 BRPM | SYSTOLIC BLOOD PRESSURE: 133 MMHG

## 2024-11-21 DIAGNOSIS — S09.90XA INJURY OF HEAD, INITIAL ENCOUNTER: ICD-10-CM

## 2024-11-21 DIAGNOSIS — S39.012A STRAIN OF LUMBAR REGION, INITIAL ENCOUNTER: ICD-10-CM
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
## 2024-11-21 DIAGNOSIS — S16.1XXA STRAIN OF NECK MUSCLE, INITIAL ENCOUNTER: Primary | ICD-10-CM

## 2024-11-21 PROCEDURE — 72125 CT NECK SPINE W/O DYE: CPT

## 2024-11-21 PROCEDURE — 70450 CT HEAD/BRAIN W/O DYE: CPT

## 2024-11-21 PROCEDURE — 72131 CT LUMBAR SPINE W/O DYE: CPT

## 2024-11-21 PROCEDURE — 73564 X-RAY EXAM KNEE 4 OR MORE: CPT

## 2024-11-21 RX ORDER — METHOCARBAMOL 500 MG/1
500 TABLET, FILM COATED ORAL 2 TIMES DAILY PRN
Qty: 20 TABLET | Refills: 0 | Status: SHIPPED | OUTPATIENT
Start: 2024-11-21

## 2024-11-21 RX ORDER — IBUPROFEN 600 MG/1
600 TABLET, FILM COATED ORAL ONCE
Status: COMPLETED | OUTPATIENT
Start: 2024-11-21 | End: 2024-11-21

## 2024-11-21 RX ADMIN — IBUPROFEN 600 MG: 600 TABLET, FILM COATED ORAL at 14:54

## 2024-11-21 NOTE — Clinical Note
Nadia Almanzar was seen and treated in our emergency department on 11/21/2024.                Diagnosis:     Nadia  may return to work on return date.    She may return on this date: 11/22/2024         If you have any questions or concerns, please don't hesitate to call.      Claude Sloan MD    ______________________________           _______________          _______________  Hospital Representative                              Date                                Time
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
## 2025-05-06 NOTE — ASSESSMENT & PLAN NOTE
Depression Screening Follow-up Plan: Patient's depression screening was positive with a PHQ-9 score of 11. Patient with underlying depression and was advised to continue current medications as prescribed.  Worsened symptoms with recent demise of her , see plan as above in anxiety    Orders:    escitalopram (LEXAPRO) 5 mg tablet; Take 1 tablet (5 mg total) by mouth daily    LORazepam (Ativan) 0.5 mg tablet; Take 1 tablet (0.5 mg total) by mouth daily as needed for anxiety    CBC and differential; Future    Comprehensive metabolic panel; Future     · Continue IV PPI

## 2025-06-13 ENCOUNTER — APPOINTMENT (INPATIENT)
Dept: CT IMAGING | Facility: HOSPITAL | Age: 65
DRG: 871 | End: 2025-06-13
Payer: COMMERCIAL

## 2025-06-13 ENCOUNTER — APPOINTMENT (EMERGENCY)
Dept: RADIOLOGY | Facility: HOSPITAL | Age: 65
DRG: 871 | End: 2025-06-13
Payer: COMMERCIAL

## 2025-06-13 ENCOUNTER — HOSPITAL ENCOUNTER (INPATIENT)
Facility: HOSPITAL | Age: 65
LOS: 3 days | Discharge: HOME | DRG: 871 | End: 2025-06-16
Attending: EMERGENCY MEDICINE | Admitting: FAMILY MEDICINE
Payer: COMMERCIAL

## 2025-06-13 ENCOUNTER — APPOINTMENT (INPATIENT)
Dept: NON INVASIVE DIAGNOSTICS | Facility: HOSPITAL | Age: 65
DRG: 871 | End: 2025-06-13
Payer: COMMERCIAL

## 2025-06-13 DIAGNOSIS — E83.42 HYPOMAGNESEMIA: ICD-10-CM

## 2025-06-13 DIAGNOSIS — R09.1 PLEURISY: ICD-10-CM

## 2025-06-13 DIAGNOSIS — R06.02 SOB (SHORTNESS OF BREATH): ICD-10-CM

## 2025-06-13 DIAGNOSIS — R09.02 HYPOXIA: Primary | ICD-10-CM

## 2025-06-13 DIAGNOSIS — J18.9 PNEUMONIA: ICD-10-CM

## 2025-06-13 DIAGNOSIS — I50.31 ACUTE DIASTOLIC HEART FAILURE (HCC): ICD-10-CM

## 2025-06-13 DIAGNOSIS — E87.79 OTHER HYPERVOLEMIA: ICD-10-CM

## 2025-06-13 DIAGNOSIS — E87.6 HYPOKALEMIA: ICD-10-CM

## 2025-06-13 PROBLEM — M54.50 CHRONIC LOW BACK PAIN: Status: ACTIVE | Noted: 2025-06-13

## 2025-06-13 PROBLEM — E61.1 IRON DEFICIENCY: Status: ACTIVE | Noted: 2023-06-27

## 2025-06-13 PROBLEM — J44.9 CHRONIC OBSTRUCTIVE PULMONARY DISEASE (HCC): Status: ACTIVE | Noted: 2018-12-04

## 2025-06-13 PROBLEM — R65.10 SIRS (SYSTEMIC INFLAMMATORY RESPONSE SYNDROME) (HCC): Status: ACTIVE | Noted: 2025-06-13

## 2025-06-13 PROBLEM — E87.70 VOLUME OVERLOAD: Status: ACTIVE | Noted: 2025-06-13

## 2025-06-13 PROBLEM — F41.9 ANXIETY AND DEPRESSION: Status: ACTIVE | Noted: 2021-07-01

## 2025-06-13 PROBLEM — E78.00 PURE HYPERCHOLESTEROLEMIA: Status: ACTIVE | Noted: 2022-05-19

## 2025-06-13 PROBLEM — I51.7 CARDIOMEGALY: Status: ACTIVE | Noted: 2023-06-27

## 2025-06-13 PROBLEM — G89.29 CHRONIC LOW BACK PAIN: Status: ACTIVE | Noted: 2025-06-13

## 2025-06-13 PROBLEM — F32.A ANXIETY AND DEPRESSION: Status: ACTIVE | Noted: 2021-07-01

## 2025-06-13 PROBLEM — A41.9 SEPSIS WITHOUT ACUTE ORGAN DYSFUNCTION (HCC): Status: ACTIVE | Noted: 2025-06-13

## 2025-06-13 LAB
ALBUMIN SERPL BCG-MCNC: 4.3 G/DL (ref 3.5–5)
ALP SERPL-CCNC: 65 U/L (ref 34–104)
ALT SERPL W P-5'-P-CCNC: 10 U/L (ref 7–52)
ANION GAP SERPL CALCULATED.3IONS-SCNC: 9 MMOL/L (ref 4–13)
ANISOCYTOSIS BLD QL SMEAR: PRESENT
AORTIC ROOT: 3.1 CM
APTT PPP: 24 SECONDS (ref 23–34)
ASCENDING AORTA: 3.4 CM
AST SERPL W P-5'-P-CCNC: 15 U/L (ref 13–39)
ATRIAL RATE: 87 BPM
BACTERIA UR QL AUTO: ABNORMAL /HPF
BASOPHILS # BLD MANUAL: 0 THOUSAND/UL (ref 0–0.1)
BASOPHILS NFR MAR MANUAL: 0 % (ref 0–1)
BILIRUB SERPL-MCNC: 0.83 MG/DL (ref 0.2–1)
BILIRUB UR QL STRIP: NEGATIVE
BNP SERPL-MCNC: 300 PG/ML (ref 0–100)
BSA FOR ECHO PROCEDURE: 1.61 M2
BUN SERPL-MCNC: 16 MG/DL (ref 5–25)
CALCIUM SERPL-MCNC: 9.5 MG/DL (ref 8.4–10.2)
CARDIAC TROPONIN I PNL SERPL HS: 5 NG/L (ref ?–50)
CHLORIDE SERPL-SCNC: 102 MMOL/L (ref 96–108)
CLARITY UR: CLEAR
CO2 SERPL-SCNC: 28 MMOL/L (ref 21–32)
COLOR UR: YELLOW
CREAT SERPL-MCNC: 0.89 MG/DL (ref 0.6–1.3)
D DIMER PPP FEU-MCNC: 0.51 UG/ML FEU
E WAVE DECELERATION TIME: 178 MS
E/A RATIO: 0.79
EOSINOPHIL # BLD MANUAL: 0 THOUSAND/UL (ref 0–0.4)
EOSINOPHIL NFR BLD MANUAL: 0 % (ref 0–6)
ERYTHROCYTE [DISTWIDTH] IN BLOOD BY AUTOMATED COUNT: 12.3 % (ref 11.6–15.1)
FLUAV AG UPPER RESP QL IA.RAPID: NEGATIVE
FLUBV AG UPPER RESP QL IA.RAPID: NEGATIVE
FRACTIONAL SHORTENING: 33 (ref 28–44)
GFR SERPL CREATININE-BSD FRML MDRD: 69 ML/MIN/1.73SQ M
GLUCOSE SERPL-MCNC: 100 MG/DL (ref 65–140)
GLUCOSE UR STRIP-MCNC: NEGATIVE MG/DL
HCT VFR BLD AUTO: 37.5 % (ref 34.8–46.1)
HGB BLD-MCNC: 12 G/DL (ref 11.5–15.4)
HGB UR QL STRIP.AUTO: NEGATIVE
INR PPP: 0.93 (ref 0.85–1.19)
INTERVENTRICULAR SEPTUM IN DIASTOLE (PARASTERNAL SHORT AXIS VIEW): 1.1 CM
INTERVENTRICULAR SEPTUM: 1.1 CM (ref 0.6–1.1)
KETONES UR STRIP-MCNC: NEGATIVE MG/DL
L PNEUMO1 AG UR QL IA.RAPID: NEGATIVE
LAAS-AP2: 17.8 CM2
LAAS-AP4: 19 CM2
LACTATE SERPL-SCNC: 1.6 MMOL/L (ref 0.5–2)
LEFT ATRIUM AREA SYSTOLE SINGLE PLANE A4C: 18.9 CM2
LEFT ATRIUM SIZE: 4 CM
LEFT ATRIUM VOLUME (MOD BIPLANE): 58 ML
LEFT ATRIUM VOLUME INDEX (MOD BIPLANE): 35.8 ML/M2
LEFT INTERNAL DIMENSION IN SYSTOLE: 3 CM (ref 2.1–4)
LEFT VENTRICLE DIASTOLIC VOLUME (MOD BIPLANE): 108 ML
LEFT VENTRICLE DIASTOLIC VOLUME INDEX (MOD BIPLANE): 67.1 ML/M2
LEFT VENTRICLE SYSTOLIC VOLUME (MOD BIPLANE): 41 ML
LEFT VENTRICLE SYSTOLIC VOLUME INDEX (MOD BIPLANE): 25.5 ML/M2
LEFT VENTRICULAR INTERNAL DIMENSION IN DIASTOLE: 4.5 CM (ref 3.5–6)
LEFT VENTRICULAR POSTERIOR WALL IN END DIASTOLE: 1.1 CM
LEFT VENTRICULAR STROKE VOLUME: 59 ML
LEUKOCYTE ESTERASE UR QL STRIP: NEGATIVE
LV EF BIPLANE MOD: 62 %
LV EF US.2D.A4C+ESTIMATED: 64 %
LVSV (TEICH): 59 ML
LYMPHOCYTES # BLD AUTO: 1.84 THOUSAND/UL (ref 0.6–4.47)
LYMPHOCYTES # BLD AUTO: 11 % (ref 14–44)
MACROCYTES BLD QL AUTO: PRESENT
MAGNESIUM SERPL-MCNC: 1.7 MG/DL (ref 1.9–2.7)
MCH RBC QN AUTO: 29.6 PG (ref 26.8–34.3)
MCHC RBC AUTO-ENTMCNC: 32 G/DL (ref 31.4–37.4)
MCV RBC AUTO: 92 FL (ref 82–98)
MICROCYTES BLD QL AUTO: PRESENT
MONOCYTES # BLD AUTO: 0.92 THOUSAND/UL (ref 0–1.22)
MONOCYTES NFR BLD: 6 % (ref 4–12)
MV E'TISSUE VEL-LAT: 14 CM/S
MV E'TISSUE VEL-SEP: 10 CM/S
MV PEAK A VEL: 0.82 M/S
MV PEAK E VEL: 65 CM/S
MV STENOSIS PRESSURE HALF TIME: 52 MS
MV VALVE AREA P 1/2 METHOD: 4.23
NEUTROPHILS # BLD MANUAL: 12.55 THOUSAND/UL (ref 1.85–7.62)
NEUTS SEG NFR BLD AUTO: 82 % (ref 43–75)
NITRITE UR QL STRIP: NEGATIVE
NON-SQ EPI CELLS URNS QL MICRO: ABNORMAL /HPF
P AXIS: -13 DEGREES
PH UR STRIP.AUTO: 6 [PH]
PHOSPHATE SERPL-MCNC: 3.3 MG/DL (ref 2.3–4.1)
PLATELET # BLD AUTO: 286 THOUSANDS/UL (ref 149–390)
PLATELET BLD QL SMEAR: ADEQUATE
PMV BLD AUTO: 9.9 FL (ref 8.9–12.7)
POIKILOCYTOSIS BLD QL SMEAR: PRESENT
POTASSIUM SERPL-SCNC: 3.1 MMOL/L (ref 3.5–5.3)
PR INTERVAL: 142 MS
PROCALCITONIN SERPL-MCNC: 0.07 NG/ML
PROT SERPL-MCNC: 6.6 G/DL (ref 6.4–8.4)
PROT UR STRIP-MCNC: NEGATIVE MG/DL
PROTHROMBIN TIME: 12.9 SECONDS (ref 12.3–15)
QRS AXIS: -7 DEGREES
QRSD INTERVAL: 84 MS
QT INTERVAL: 390 MS
QTC INTERVAL: 469 MS
RA PRESSURE ESTIMATED: 15 MMHG
RBC # BLD AUTO: 4.06 MILLION/UL (ref 3.81–5.12)
RBC #/AREA URNS AUTO: ABNORMAL /HPF
RBC MORPH BLD: PRESENT
RIGHT ATRIUM AREA SYSTOLE A4C: 11.5 CM2
RIGHT VENTRICLE ID DIMENSION: 4.1 CM
RV PSP: 51 MMHG
S PNEUM AG UR QL: NEGATIVE
SARS-COV+SARS-COV-2 AG RESP QL IA.RAPID: NEGATIVE
SL CV LEFT ATRIUM LENGTH A2C: 4.7 CM
SL CV LV EF: 60
SL CV PED ECHO LEFT VENTRICLE DIASTOLIC VOLUME (MOD BIPLANE) 2D: 93 ML
SL CV PED ECHO LEFT VENTRICLE SYSTOLIC VOLUME (MOD BIPLANE) 2D: 34 ML
SODIUM SERPL-SCNC: 139 MMOL/L (ref 135–147)
SP GR UR STRIP.AUTO: 1.02 (ref 1–1.03)
T WAVE AXIS: -11 DEGREES
TR MAX PG: 36 MMHG
TR PEAK VELOCITY: 3 M/S
TRICUSPID ANNULAR PLANE SYSTOLIC EXCURSION: 2.5 CM
TRICUSPID VALVE PEAK REGURGITATION VELOCITY: 2.99 M/S
UROBILINOGEN UR QL STRIP.AUTO: 0.2 E.U./DL
VARIANT LYMPHS # BLD AUTO: 1 %
VENTRICULAR RATE: 87 BPM
WBC # BLD AUTO: 15.3 THOUSAND/UL (ref 4.31–10.16)
WBC #/AREA URNS AUTO: ABNORMAL /HPF

## 2025-06-13 PROCEDURE — 87147 CULTURE TYPE IMMUNOLOGIC: CPT

## 2025-06-13 PROCEDURE — 96375 TX/PRO/DX INJ NEW DRUG ADDON: CPT

## 2025-06-13 PROCEDURE — 85007 BL SMEAR W/DIFF WBC COUNT: CPT | Performed by: EMERGENCY MEDICINE

## 2025-06-13 PROCEDURE — 85610 PROTHROMBIN TIME: CPT | Performed by: EMERGENCY MEDICINE

## 2025-06-13 PROCEDURE — 85379 FIBRIN DEGRADATION QUANT: CPT | Performed by: EMERGENCY MEDICINE

## 2025-06-13 PROCEDURE — 87449 NOS EACH ORGANISM AG IA: CPT

## 2025-06-13 PROCEDURE — 85730 THROMBOPLASTIN TIME PARTIAL: CPT | Performed by: EMERGENCY MEDICINE

## 2025-06-13 PROCEDURE — 81001 URINALYSIS AUTO W/SCOPE: CPT

## 2025-06-13 PROCEDURE — 96365 THER/PROPH/DIAG IV INF INIT: CPT

## 2025-06-13 PROCEDURE — 99223 1ST HOSP IP/OBS HIGH 75: CPT | Performed by: FAMILY MEDICINE

## 2025-06-13 PROCEDURE — 87040 BLOOD CULTURE FOR BACTERIA: CPT | Performed by: EMERGENCY MEDICINE

## 2025-06-13 PROCEDURE — 93306 TTE W/DOPPLER COMPLETE: CPT

## 2025-06-13 PROCEDURE — 74176 CT ABD & PELVIS W/O CONTRAST: CPT

## 2025-06-13 PROCEDURE — 80053 COMPREHEN METABOLIC PANEL: CPT | Performed by: EMERGENCY MEDICINE

## 2025-06-13 PROCEDURE — 83735 ASSAY OF MAGNESIUM: CPT | Performed by: EMERGENCY MEDICINE

## 2025-06-13 PROCEDURE — 87811 SARS-COV-2 COVID19 W/OPTIC: CPT | Performed by: EMERGENCY MEDICINE

## 2025-06-13 PROCEDURE — 93005 ELECTROCARDIOGRAM TRACING: CPT

## 2025-06-13 PROCEDURE — 83605 ASSAY OF LACTIC ACID: CPT | Performed by: EMERGENCY MEDICINE

## 2025-06-13 PROCEDURE — 99285 EMERGENCY DEPT VISIT HI MDM: CPT | Performed by: EMERGENCY MEDICINE

## 2025-06-13 PROCEDURE — 84484 ASSAY OF TROPONIN QUANT: CPT | Performed by: EMERGENCY MEDICINE

## 2025-06-13 PROCEDURE — 84145 PROCALCITONIN (PCT): CPT

## 2025-06-13 PROCEDURE — 36415 COLL VENOUS BLD VENIPUNCTURE: CPT | Performed by: EMERGENCY MEDICINE

## 2025-06-13 PROCEDURE — 71250 CT THORAX DX C-: CPT

## 2025-06-13 PROCEDURE — 85027 COMPLETE CBC AUTOMATED: CPT | Performed by: EMERGENCY MEDICINE

## 2025-06-13 PROCEDURE — 84100 ASSAY OF PHOSPHORUS: CPT

## 2025-06-13 PROCEDURE — 71045 X-RAY EXAM CHEST 1 VIEW: CPT

## 2025-06-13 PROCEDURE — 99285 EMERGENCY DEPT VISIT HI MDM: CPT

## 2025-06-13 PROCEDURE — 83880 ASSAY OF NATRIURETIC PEPTIDE: CPT | Performed by: EMERGENCY MEDICINE

## 2025-06-13 PROCEDURE — 87081 CULTURE SCREEN ONLY: CPT

## 2025-06-13 PROCEDURE — 87804 INFLUENZA ASSAY W/OPTIC: CPT | Performed by: EMERGENCY MEDICINE

## 2025-06-13 PROCEDURE — 96367 TX/PROPH/DG ADDL SEQ IV INF: CPT

## 2025-06-13 RX ORDER — HYDROCHLOROTHIAZIDE 25 MG/1
25 TABLET ORAL DAILY
COMMUNITY
End: 2025-06-16

## 2025-06-13 RX ORDER — FUROSEMIDE 40 MG/1
40 TABLET ORAL AS NEEDED
COMMUNITY
End: 2025-06-16

## 2025-06-13 RX ORDER — BUPRENORPHINE AND NALOXONE 8; 2 MG/1; MG/1
8 FILM, SOLUBLE BUCCAL; SUBLINGUAL 3 TIMES DAILY PRN
Status: DISCONTINUED | OUTPATIENT
Start: 2025-06-13 | End: 2025-06-16 | Stop reason: HOSPADM

## 2025-06-13 RX ORDER — FUROSEMIDE 10 MG/ML
40 INJECTION INTRAMUSCULAR; INTRAVENOUS 2 TIMES DAILY
Status: DISCONTINUED | OUTPATIENT
Start: 2025-06-13 | End: 2025-06-14

## 2025-06-13 RX ORDER — KETOROLAC TROMETHAMINE 30 MG/ML
15 INJECTION, SOLUTION INTRAMUSCULAR; INTRAVENOUS ONCE
Status: COMPLETED | OUTPATIENT
Start: 2025-06-13 | End: 2025-06-13

## 2025-06-13 RX ORDER — DEXAMETHASONE SODIUM PHOSPHATE 10 MG/ML
6 INJECTION, SOLUTION INTRAMUSCULAR; INTRAVENOUS ONCE
Status: COMPLETED | OUTPATIENT
Start: 2025-06-13 | End: 2025-06-13

## 2025-06-13 RX ORDER — SODIUM CHLORIDE, SODIUM GLUCONATE, SODIUM ACETATE, POTASSIUM CHLORIDE, MAGNESIUM CHLORIDE, SODIUM PHOSPHATE, DIBASIC, AND POTASSIUM PHOSPHATE .53; .5; .37; .037; .03; .012; .00082 G/100ML; G/100ML; G/100ML; G/100ML; G/100ML; G/100ML; G/100ML
1000 INJECTION, SOLUTION INTRAVENOUS ONCE
Status: DISCONTINUED | OUTPATIENT
Start: 2025-06-13 | End: 2025-06-13

## 2025-06-13 RX ORDER — HYDROXYCHLOROQUINE SULFATE 200 MG/1
200 TABLET, FILM COATED ORAL DAILY
Status: DISCONTINUED | OUTPATIENT
Start: 2025-06-13 | End: 2025-06-16 | Stop reason: HOSPADM

## 2025-06-13 RX ORDER — GABAPENTIN 300 MG/1
600 CAPSULE ORAL 2 TIMES DAILY
Status: DISCONTINUED | OUTPATIENT
Start: 2025-06-13 | End: 2025-06-16 | Stop reason: HOSPADM

## 2025-06-13 RX ORDER — PREDNISONE 5 MG/1
5 TABLET ORAL DAILY
Status: DISCONTINUED | OUTPATIENT
Start: 2025-06-13 | End: 2025-06-15

## 2025-06-13 RX ORDER — LANOLIN ALCOHOL/MO/W.PET/CERES
400 CREAM (GRAM) TOPICAL DAILY
Status: DISCONTINUED | OUTPATIENT
Start: 2025-06-14 | End: 2025-06-16 | Stop reason: HOSPADM

## 2025-06-13 RX ORDER — LOSARTAN POTASSIUM 100 MG/1
100 TABLET ORAL DAILY
COMMUNITY
End: 2025-06-16

## 2025-06-13 RX ORDER — METHOCARBAMOL 500 MG/1
500 TABLET, FILM COATED ORAL 2 TIMES DAILY PRN
Status: DISCONTINUED | OUTPATIENT
Start: 2025-06-13 | End: 2025-06-16 | Stop reason: HOSPADM

## 2025-06-13 RX ORDER — BUPRENORPHINE AND NALOXONE 2; .5 MG/1; MG/1
2 FILM, SOLUBLE BUCCAL; SUBLINGUAL 3 TIMES DAILY PRN
Status: DISCONTINUED | OUTPATIENT
Start: 2025-06-13 | End: 2025-06-13

## 2025-06-13 RX ORDER — MAGNESIUM SULFATE HEPTAHYDRATE 40 MG/ML
2 INJECTION, SOLUTION INTRAVENOUS ONCE
Status: COMPLETED | OUTPATIENT
Start: 2025-06-13 | End: 2025-06-13

## 2025-06-13 RX ORDER — HYDROCHLOROTHIAZIDE 25 MG/1
25 TABLET ORAL DAILY
Status: DISCONTINUED | OUTPATIENT
Start: 2025-06-13 | End: 2025-06-14

## 2025-06-13 RX ORDER — ONDANSETRON 2 MG/ML
4 INJECTION INTRAMUSCULAR; INTRAVENOUS EVERY 6 HOURS PRN
Status: DISCONTINUED | OUTPATIENT
Start: 2025-06-13 | End: 2025-06-16 | Stop reason: HOSPADM

## 2025-06-13 RX ORDER — POTASSIUM CHLORIDE 14.9 MG/ML
20 INJECTION INTRAVENOUS
Status: COMPLETED | OUTPATIENT
Start: 2025-06-13 | End: 2025-06-13

## 2025-06-13 RX ORDER — POTASSIUM CHLORIDE 1500 MG/1
40 TABLET, EXTENDED RELEASE ORAL 2 TIMES DAILY
Status: DISCONTINUED | OUTPATIENT
Start: 2025-06-13 | End: 2025-06-16 | Stop reason: HOSPADM

## 2025-06-13 RX ORDER — GUAIFENESIN 600 MG/1
600 TABLET, EXTENDED RELEASE ORAL 2 TIMES DAILY
Status: DISCONTINUED | OUTPATIENT
Start: 2025-06-13 | End: 2025-06-13

## 2025-06-13 RX ORDER — FUROSEMIDE 10 MG/ML
60 INJECTION INTRAMUSCULAR; INTRAVENOUS ONCE
Status: COMPLETED | OUTPATIENT
Start: 2025-06-13 | End: 2025-06-13

## 2025-06-13 RX ORDER — LOSARTAN POTASSIUM 50 MG/1
100 TABLET ORAL DAILY
Status: DISCONTINUED | OUTPATIENT
Start: 2025-06-13 | End: 2025-06-16 | Stop reason: HOSPADM

## 2025-06-13 RX ORDER — CEFTRIAXONE 1 G/50ML
1000 INJECTION, SOLUTION INTRAVENOUS ONCE
Status: COMPLETED | OUTPATIENT
Start: 2025-06-13 | End: 2025-06-13

## 2025-06-13 RX ORDER — ACETAMINOPHEN 325 MG/1
650 TABLET ORAL EVERY 6 HOURS PRN
Status: DISCONTINUED | OUTPATIENT
Start: 2025-06-13 | End: 2025-06-16 | Stop reason: HOSPADM

## 2025-06-13 RX ORDER — CEFTRIAXONE 2 G/50ML
2000 INJECTION, SOLUTION INTRAVENOUS EVERY 24 HOURS
Status: DISCONTINUED | OUTPATIENT
Start: 2025-06-14 | End: 2025-06-16 | Stop reason: HOSPADM

## 2025-06-13 RX ORDER — CEFTRIAXONE 2 G/50ML
2000 INJECTION, SOLUTION INTRAVENOUS EVERY 24 HOURS
Status: DISCONTINUED | OUTPATIENT
Start: 2025-06-13 | End: 2025-06-13

## 2025-06-13 RX ORDER — HEPARIN SODIUM 5000 [USP'U]/ML
5000 INJECTION, SOLUTION INTRAVENOUS; SUBCUTANEOUS EVERY 8 HOURS SCHEDULED
Status: DISCONTINUED | OUTPATIENT
Start: 2025-06-13 | End: 2025-06-16 | Stop reason: HOSPADM

## 2025-06-13 RX ORDER — CALCIUM CARBONATE 500 MG/1
1000 TABLET, CHEWABLE ORAL DAILY PRN
Status: DISCONTINUED | OUTPATIENT
Start: 2025-06-13 | End: 2025-06-16 | Stop reason: HOSPADM

## 2025-06-13 RX ORDER — KETOROLAC TROMETHAMINE 30 MG/ML
15 INJECTION, SOLUTION INTRAMUSCULAR; INTRAVENOUS EVERY 6 HOURS PRN
Status: DISCONTINUED | OUTPATIENT
Start: 2025-06-13 | End: 2025-06-15

## 2025-06-13 RX ORDER — CEFTRIAXONE 1 G/50ML
1000 INJECTION, SOLUTION INTRAVENOUS EVERY 24 HOURS
Status: DISCONTINUED | OUTPATIENT
Start: 2025-06-13 | End: 2025-06-13

## 2025-06-13 RX ADMIN — HEPARIN SODIUM 5000 UNITS: 5000 INJECTION INTRAVENOUS; SUBCUTANEOUS at 14:10

## 2025-06-13 RX ADMIN — GABAPENTIN 600 MG: 300 CAPSULE ORAL at 09:55

## 2025-06-13 RX ADMIN — DEXAMETHASONE SODIUM PHOSPHATE 6 MG: 10 INJECTION, SOLUTION INTRAMUSCULAR; INTRAVENOUS at 05:27

## 2025-06-13 RX ADMIN — ACETAMINOPHEN 650 MG: 325 TABLET ORAL at 21:44

## 2025-06-13 RX ADMIN — KETOROLAC TROMETHAMINE 15 MG: 30 INJECTION, SOLUTION INTRAMUSCULAR; INTRAVENOUS at 17:43

## 2025-06-13 RX ADMIN — POTASSIUM CHLORIDE 20 MEQ: 14.9 INJECTION, SOLUTION INTRAVENOUS at 09:51

## 2025-06-13 RX ADMIN — POTASSIUM CHLORIDE 20 MEQ: 14.9 INJECTION, SOLUTION INTRAVENOUS at 06:27

## 2025-06-13 RX ADMIN — HYDROXYCHLOROQUINE SULFATE 200 MG: 200 TABLET, FILM COATED ORAL at 09:55

## 2025-06-13 RX ADMIN — POTASSIUM CHLORIDE 40 MEQ: 1500 TABLET, EXTENDED RELEASE ORAL at 17:47

## 2025-06-13 RX ADMIN — FUROSEMIDE 60 MG: 10 INJECTION, SOLUTION INTRAMUSCULAR; INTRAVENOUS at 05:29

## 2025-06-13 RX ADMIN — GABAPENTIN 600 MG: 300 CAPSULE ORAL at 21:44

## 2025-06-13 RX ADMIN — PREDNISONE 5 MG: 5 TABLET ORAL at 09:55

## 2025-06-13 RX ADMIN — METHOCARBAMOL 500 MG: 500 TABLET ORAL at 09:55

## 2025-06-13 RX ADMIN — FUROSEMIDE 40 MG: 10 INJECTION, SOLUTION INTRAMUSCULAR; INTRAVENOUS at 17:43

## 2025-06-13 RX ADMIN — POTASSIUM CHLORIDE 40 MEQ: 1500 TABLET, EXTENDED RELEASE ORAL at 11:47

## 2025-06-13 RX ADMIN — HEPARIN SODIUM 5000 UNITS: 5000 INJECTION INTRAVENOUS; SUBCUTANEOUS at 21:44

## 2025-06-13 RX ADMIN — CEFTRIAXONE 1000 MG: 1 INJECTION, SOLUTION INTRAVENOUS at 04:38

## 2025-06-13 RX ADMIN — HEPARIN SODIUM 5000 UNITS: 5000 INJECTION INTRAVENOUS; SUBCUTANEOUS at 09:49

## 2025-06-13 RX ADMIN — MAGNESIUM SULFATE HEPTAHYDRATE 2 G: 40 INJECTION, SOLUTION INTRAVENOUS at 05:09

## 2025-06-13 RX ADMIN — KETOROLAC TROMETHAMINE 15 MG: 30 INJECTION, SOLUTION INTRAMUSCULAR at 04:35

## 2025-06-13 RX ADMIN — BUPRENORPHINE AND NALOXONE 8 MG: 8; 2 FILM BUCCAL; SUBLINGUAL at 11:46

## 2025-06-13 RX ADMIN — GUAIFENESIN 600 MG: 600 TABLET ORAL at 09:55

## 2025-06-13 NOTE — ASSESSMENT & PLAN NOTE
POA with SOB for 24 hours and right sided back pain   No formal diagnosis of CHF per patient or chart review, but does take prn lasix 40mg for lower extremity edema, thought to be secondary to her autoimmune diseases  CT chest with cardiomegaly, small pleural effusions and patchy groundglass and alveolar opacities in BLLL. Component of fluid overload. Superimposed infection could be considered   Bnp 300   Last echo 3/2024 normal with EF 60-65%  Repeat Echo pending   Will start lasix 40mg iv bid   Cardiology consulted   I&O, daily weights

## 2025-06-13 NOTE — ASSESSMENT & PLAN NOTE
Met sepsis criteria with RR 23 and WBC 15.3   Likely in setting of volume overload   CT chest consistent with volume overload, cannot rule out superimposed pneumonia   COVID/flu negative  UA negative for a UTI  MRSA and blood cultures pending  Afebrile, lactic acid normal at 1.6, and procalcitonin normal at 0.07.  Hold off on fluids while in chf exacerbation   Will continue rocephin. Low threshold to discontinue abx if procal remains negative

## 2025-06-13 NOTE — ASSESSMENT & PLAN NOTE
Likely secondary to prednisone use   Elevated bnp, edema, pleural effusions  Echo showing dilated IVC but normal LVEF  Currently diuresing with lasix 40 IV BID appropriately  Monitor I and O  Daily standing weights  Monitor renal function and electrolytes  Likely to transition to lasix orally tomorrow, 20 mg PO daily is appropriate  In the future, would consider increasing to lasix 40 mg during prednisone taper use

## 2025-06-13 NOTE — ED NOTES
Ambulatory pulse ox trial done with patient. Patient's O2 dropped to 89% while ambulating. Provider made aware at this time.      Brooklynn Hopkins RN  06/13/25 0699
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
## 2025-06-13 NOTE — ASSESSMENT & PLAN NOTE
Continue hydroxychloroquine  Currently on prednisone taper. Has one week of 5mg left for RA flare. Will continue
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
## 2025-06-13 NOTE — CONSULTS
Consultation - Cardiology   Name: Nadia Almanzar 65 y.o. female I MRN: [id number]  Unit/Bed#: -01 I Date of Admission: 6/13/2025   Date of Service: 6/13/2025 I Hospital Day: 0   Inpatient consult to Cardiology  Consult performed by: Adriana Castillo PA-C  Consult ordered by: Jumana Kaiser PA-C        Physician Requesting Evaluation: Mary Jo Navarrete MD   Reason for Evaluation / Principal Problem: CHFpEF    Assessment & Plan  Acute heart failure with preserved ejection fraction (HCC)  Likely secondary to prednisone use   Elevated bnp, edema, pleural effusions  Echo showing dilated IVC but normal LVEF  Currently diuresing with lasix 40 IV BID appropriately  Monitor I and O  Daily standing weights  Monitor renal function and electrolytes  Likely to transition to lasix orally tomorrow, 20 mg PO daily is appropriate  In the future, would consider increasing to lasix 40 mg during prednisone taper use   Sjogren's syndrome (HCC)  RA (rheumatoid arthritis) (HCC)  On plaquenil   Per medicine  Normal QTC  Hypokalemia  Replaced  Will add kdur 40 meq PO BID while diuresing  Hypomagnesemia  Being replaced   SIRS (systemic inflammatory response syndrome) (HCC)  Likely secondary to CHF exacerbation  Acute right-sided low back pain without sciatica  Per medicine  Appears more likely to be pleurisy related   I have discussed the above management plan in detail with the primary service.     History of Present Illness   Nadia Almanzar is a 65 y.o. female with history of RA on plaquenil, intermittent edema requiring prn doses of lasix is here for concerns with edema and sob. Reports that a few days ago she started prednisone and shortly after noted some edema. She did take lasix a few days ago but symptoms persisted. Yesterday started noticing pain in the R mid back .she states this pain is significantly worse if she takes a deep breath or touches this area.     Review of Systems   Constitutional:  Negative for  diaphoresis and fever.   Respiratory:  Positive for shortness of breath. Negative for chest tightness.    Cardiovascular:  Positive for leg swelling. Negative for chest pain and palpitations.   Musculoskeletal:  Positive for back pain.   Neurological:  Negative for dizziness and weakness.   Psychiatric/Behavioral:  Negative for agitation.      Medical History Review: I have reviewed the patient's PMH, PSH, Social History, Family History, Meds, and Allergies     Objective :  Temp:  [97.2 °F (36.2 °C)-99 °F (37.2 °C)] 97.2 °F (36.2 °C)  HR:  [73-88] 73  BP: ()/(52-93) 115/70  Resp:  [18-23] 18  SpO2:  [91 %-97 %] 93 %  O2 Device: None (Room air)  Orthostatic Blood Pressures      Flowsheet Row Most Recent Value   Blood Pressure 115/70 filed at 06/13/2025 0653   Patient Position - Orthostatic VS Sitting filed at 06/13/2025 0630          First Weight: Weight - Scale: 68.4 kg (150 lb 12.8 oz) (06/13/25 0340)  Vitals:    06/13/25 0340 06/13/25 0833   Weight: 68.4 kg (150 lb 12.8 oz) 64.6 kg (142 lb 6.4 oz)       Physical Exam  Vitals and nursing note reviewed.   Constitutional:       Appearance: Normal appearance.   HENT:      Head: Normocephalic and atraumatic.     Cardiovascular:      Rate and Rhythm: Normal rate.      Heart sounds: No murmur heard.  Pulmonary:      Effort: Pulmonary effort is normal.      Breath sounds: No wheezing.   Abdominal:      Palpations: Abdomen is soft.     Musculoskeletal:         General: Swelling present.      Cervical back: Neck supple.     Skin:     General: Skin is warm.      Capillary Refill: Capillary refill takes less than 2 seconds.     Neurological:      General: No focal deficit present.      Mental Status: She is alert and oriented to person, place, and time.           Lab Results: I have reviewed the following results:CBC/BMP:   .     06/13/25  0352   WBC 15.30*   HGB 12.0   HCT 37.5      SODIUM 139   K 3.1*      CO2 28   BUN 16   CREATININE 0.89   GLUC 100   MG  1.7*   PHOS 3.3      Results from last 7 days   Lab Units 06/13/25  0352   WBC Thousand/uL 15.30*   HEMOGLOBIN g/dL 12.0   HEMATOCRIT % 37.5   PLATELETS Thousands/uL 286     Results from last 7 days   Lab Units 06/13/25  0352   POTASSIUM mmol/L 3.1*   CHLORIDE mmol/L 102   CO2 mmol/L 28   BUN mg/dL 16   CREATININE mg/dL 0.89   CALCIUM mg/dL 9.5     Results from last 7 days   Lab Units 06/13/25  0352   INR  0.93   PTT seconds 24     Lab Results   Component Value Date    HGBA1C 5.3 11/08/2022     Lab Results   Component Value Date    TROPONINI <0.02 03/11/2021       Imaging Results Review: I reviewed radiology reports from this admission including: CT chest.  Other Study Results Review: EKG was reviewed.     EKG 6/13/2025:  Normal sinus rhythm  Minimal voltage criteria for LVH, may be normal variant ( R in aVL )  ST & T wave abnormality, consider lateral ischemia  Abnormal ECG  Confirmed by Johann Villatoro ([id number]) on 6/13/2025 8:50:50 AM     Echo 6/13/2025:    Left Ventricle: Left ventricular cavity size is normal. Wall thickness is normal. The left ventricular ejection fraction is 60% by visual estimation. Systolic function is normal. Wall motion is normal. Diastolic function is mildly abnormal, consistent with grade I (abnormal) relaxation.    Left Atrium: The atrium is mildly dilated (35-41 mL/m2).    Tricuspid Valve: There is mild to moderate regurgitation. The estimated right ventricular systolic pressure is 51.00 mmHg.    IVC/SVC: The right atrial pressure is estimated at 15.0 mmHg. The inferior vena cava is dilated.

## 2025-06-13 NOTE — PLAN OF CARE
Problem: PAIN - ADULT  Goal: Verbalizes/displays adequate comfort level or baseline comfort level  Description: Interventions:  - Encourage patient to monitor pain and request assistance  - Assess pain using appropriate pain scale  - Administer analgesics as ordered based on type and severity of pain and evaluate response  - Implement non-pharmacological measures as appropriate and evaluate response  - Consider cultural and social influences on pain and pain management  - Notify physician/advanced practitioner if interventions unsuccessful or patient reports new pain  - Educate patient/family on pain management process including their role and importance of  reporting pain   - Provide non-pharmacologic/complimentary pain relief interventions  Outcome: Progressing     Problem: INFECTION - ADULT  Goal: Absence or prevention of progression during hospitalization  Description: INTERVENTIONS:  - Assess and monitor for signs and symptoms of infection  - Monitor lab/diagnostic results  - Monitor all insertion sites, i.e. indwelling lines, tubes, and drains  - Monitor endotracheal if appropriate and nasal secretions for changes in amount and color  - [place] appropriate cooling/warming therapies per order  - Administer medications as ordered  - Instruct and encourage patient and family to use good hand hygiene technique  - Identify and instruct in appropriate isolation precautions for identified infection/condition  Outcome: Progressing  Goal: Absence of fever/infection during neutropenic period  Description: INTERVENTIONS:  - Monitor WBC  - Perform strict hand hygiene  - Limit to healthy visitors only  - No plants, dried, fresh or silk flowers with parsons in patient room  Outcome: Progressing     Problem: SAFETY ADULT  Goal: Patient will remain free of falls  Description: INTERVENTIONS:  - Educate patient/family on patient safety including physical limitations  - Instruct patient to call for assistance with activity   -  Consider consulting OT/PT to assist with strengthening/mobility based on AM PAC & JH-HLM score  - Consult OT/PT to assist with strengthening/mobility   - Keep Call bell within reach  - Keep bed low and locked with side rails adjusted as appropriate  - Keep care items and personal belongings within reach  - Initiate and maintain comfort rounds  - Apply yellow socks and bracelet for high fall risk patients  - Consider moving patient to room near nurses station  Outcome: Progressing     Problem: DISCHARGE PLANNING  Goal: Discharge to home or other facility with appropriate resources  Description: INTERVENTIONS:  - Identify barriers to discharge w/patient and caregiver  - Arrange for needed discharge resources and transportation as appropriate  - Identify discharge learning needs (meds, wound care, etc.)  - Arrange for interpretive services to assist at discharge as needed  - Refer to Case Management Department for coordinating discharge planning if the patient needs post-hospital services based on physician/advanced practitioner order or complex needs related to functional status, cognitive ability, or social support system  Outcome: Progressing     Problem: Knowledge Deficit  Goal: Patient/family/caregiver demonstrates understanding of disease process, treatment plan, medications, and discharge instructions  Description: Complete learning assessment and assess knowledge base.  Interventions:  - Provide teaching at level of understanding  - Provide teaching via preferred learning methods  Outcome: Progressing     Problem: CARDIOVASCULAR - ADULT  Goal: Maintains optimal cardiac output and hemodynamic stability  Description: INTERVENTIONS:  - Monitor I/O, vital signs and rhythm  - Monitor for S/S and trends of decreased cardiac output  - Administer and titrate ordered vasoactive medications to optimize hemodynamic stability  - Assess quality of pulses, skin color and temperature  - Assess for signs of decreased coronary  artery perfusion  - Instruct patient to report change in severity of symptoms  Outcome: Progressing  Goal: Absence of cardiac dysrhythmias or at baseline rhythm  Description: INTERVENTIONS:  - Continuous cardiac monitoring, vital signs, obtain 12 lead EKG if ordered  - Administer antiarrhythmic and heart rate control medications as ordered  - Monitor electrolytes and administer replacement therapy as ordered  Outcome: Progressing     Problem: RESPIRATORY - ADULT  Goal: Achieves optimal ventilation and oxygenation  Description: INTERVENTIONS:  - Assess for changes in respiratory status  - Assess for changes in mentation and behavior  - Position to facilitate oxygenation and minimize respiratory effort  - Oxygen administered by appropriate delivery if ordered  - Initiate smoking cessation education as indicated  - Encourage broncho-pulmonary hygiene including cough, deep breathe, Incentive Spirometry  - Assess the need for suctioning and aspirate as needed  - Assess and instruct to report SOB or any respiratory difficulty  - Respiratory Therapy support as indicated  Outcome: Progressing

## 2025-06-13 NOTE — ED NOTES
Patient home medication sent to pharmacy by this RN at this time.      Brooklynn Hopkins RN  06/13/25 0679

## 2025-06-13 NOTE — ASSESSMENT & PLAN NOTE
Acute on chronic. States current pain is not her normal, daily pain   Continue suboxone 2mg up to TID as needed for back pain, gabapentin, and robaxin   Reviewed PDMP. No red flags   Will check CT renal stone study to rule out stone causing right sided low back pain  Potential pleurisy, continue steroids

## 2025-06-14 PROBLEM — R09.1 PLEURISY: Status: ACTIVE | Noted: 2025-06-14

## 2025-06-14 PROBLEM — E87.6 HYPOKALEMIA: Status: RESOLVED | Noted: 2025-06-13 | Resolved: 2025-06-14

## 2025-06-14 PROBLEM — E83.42 HYPOMAGNESEMIA: Status: RESOLVED | Noted: 2025-06-13 | Resolved: 2025-06-14

## 2025-06-14 LAB
ALBUMIN SERPL BCG-MCNC: 3.6 G/DL (ref 3.5–5)
ALP SERPL-CCNC: 56 U/L (ref 34–104)
ALT SERPL W P-5'-P-CCNC: 9 U/L (ref 7–52)
ANION GAP SERPL CALCULATED.3IONS-SCNC: 6 MMOL/L (ref 4–13)
AST SERPL W P-5'-P-CCNC: 11 U/L (ref 13–39)
BILIRUB SERPL-MCNC: 0.91 MG/DL (ref 0.2–1)
BUN SERPL-MCNC: 23 MG/DL (ref 5–25)
CALCIUM SERPL-MCNC: 8.8 MG/DL (ref 8.4–10.2)
CHLORIDE SERPL-SCNC: 103 MMOL/L (ref 96–108)
CO2 SERPL-SCNC: 28 MMOL/L (ref 21–32)
CREAT SERPL-MCNC: 1.1 MG/DL (ref 0.6–1.3)
ERYTHROCYTE [DISTWIDTH] IN BLOOD BY AUTOMATED COUNT: 12.5 % (ref 11.6–15.1)
GFR SERPL CREATININE-BSD FRML MDRD: 53 ML/MIN/1.73SQ M
GLUCOSE SERPL-MCNC: 92 MG/DL (ref 65–140)
HCT VFR BLD AUTO: 34.1 % (ref 34.8–46.1)
HGB BLD-MCNC: 11.1 G/DL (ref 11.5–15.4)
MAGNESIUM SERPL-MCNC: 2.1 MG/DL (ref 1.9–2.7)
MCH RBC QN AUTO: 30 PG (ref 26.8–34.3)
MCHC RBC AUTO-ENTMCNC: 32.6 G/DL (ref 31.4–37.4)
MCV RBC AUTO: 92 FL (ref 82–98)
MRSA NOSE QL CULT: ABNORMAL
MRSA NOSE QL CULT: ABNORMAL
PLATELET # BLD AUTO: 244 THOUSANDS/UL (ref 149–390)
PMV BLD AUTO: 9.7 FL (ref 8.9–12.7)
POTASSIUM SERPL-SCNC: 4.5 MMOL/L (ref 3.5–5.3)
PROCALCITONIN SERPL-MCNC: 1.55 NG/ML
PROT SERPL-MCNC: 6 G/DL (ref 6.4–8.4)
RBC # BLD AUTO: 3.7 MILLION/UL (ref 3.81–5.12)
SODIUM SERPL-SCNC: 137 MMOL/L (ref 135–147)
WBC # BLD AUTO: 15.23 THOUSAND/UL (ref 4.31–10.16)

## 2025-06-14 PROCEDURE — 84145 PROCALCITONIN (PCT): CPT

## 2025-06-14 PROCEDURE — 85027 COMPLETE CBC AUTOMATED: CPT

## 2025-06-14 PROCEDURE — 99232 SBSQ HOSP IP/OBS MODERATE 35: CPT

## 2025-06-14 PROCEDURE — 83735 ASSAY OF MAGNESIUM: CPT

## 2025-06-14 PROCEDURE — 80053 COMPREHEN METABOLIC PANEL: CPT

## 2025-06-14 RX ORDER — FUROSEMIDE 20 MG/1
20 TABLET ORAL DAILY
Status: DISCONTINUED | OUTPATIENT
Start: 2025-06-15 | End: 2025-06-16

## 2025-06-14 RX ORDER — GUAIFENESIN 600 MG/1
600 TABLET, EXTENDED RELEASE ORAL EVERY 12 HOURS SCHEDULED
Status: DISCONTINUED | OUTPATIENT
Start: 2025-06-14 | End: 2025-06-16 | Stop reason: HOSPADM

## 2025-06-14 RX ORDER — LIDOCAINE 50 MG/G
1 PATCH TOPICAL DAILY
Status: DISCONTINUED | OUTPATIENT
Start: 2025-06-14 | End: 2025-06-16 | Stop reason: HOSPADM

## 2025-06-14 RX ADMIN — BUPRENORPHINE AND NALOXONE 8 MG: 8; 2 FILM BUCCAL; SUBLINGUAL at 12:33

## 2025-06-14 RX ADMIN — HEPARIN SODIUM 5000 UNITS: 5000 INJECTION INTRAVENOUS; SUBCUTANEOUS at 12:31

## 2025-06-14 RX ADMIN — HEPARIN SODIUM 5000 UNITS: 5000 INJECTION INTRAVENOUS; SUBCUTANEOUS at 05:10

## 2025-06-14 RX ADMIN — BUPRENORPHINE AND NALOXONE 8 MG: 8; 2 FILM BUCCAL; SUBLINGUAL at 21:11

## 2025-06-14 RX ADMIN — KETOROLAC TROMETHAMINE 15 MG: 30 INJECTION, SOLUTION INTRAMUSCULAR; INTRAVENOUS at 03:57

## 2025-06-14 RX ADMIN — GABAPENTIN 600 MG: 300 CAPSULE ORAL at 21:11

## 2025-06-14 RX ADMIN — GUAIFENESIN 600 MG: 600 TABLET ORAL at 10:20

## 2025-06-14 RX ADMIN — POTASSIUM CHLORIDE 40 MEQ: 1500 TABLET, EXTENDED RELEASE ORAL at 08:27

## 2025-06-14 RX ADMIN — ACETAMINOPHEN 650 MG: 325 TABLET ORAL at 10:20

## 2025-06-14 RX ADMIN — BUPRENORPHINE AND NALOXONE 8 MG: 8; 2 FILM BUCCAL; SUBLINGUAL at 08:27

## 2025-06-14 RX ADMIN — Medication 400 MG: at 08:28

## 2025-06-14 RX ADMIN — METHOCARBAMOL 500 MG: 500 TABLET ORAL at 12:31

## 2025-06-14 RX ADMIN — CEFTRIAXONE 2000 MG: 2 INJECTION, SOLUTION INTRAVENOUS at 03:57

## 2025-06-14 RX ADMIN — FUROSEMIDE 40 MG: 10 INJECTION, SOLUTION INTRAMUSCULAR; INTRAVENOUS at 08:28

## 2025-06-14 RX ADMIN — LIDOCAINE 1 PATCH: 50 PATCH TOPICAL at 10:20

## 2025-06-14 RX ADMIN — HEPARIN SODIUM 5000 UNITS: 5000 INJECTION INTRAVENOUS; SUBCUTANEOUS at 21:11

## 2025-06-14 RX ADMIN — POTASSIUM CHLORIDE 40 MEQ: 1500 TABLET, EXTENDED RELEASE ORAL at 18:40

## 2025-06-14 RX ADMIN — GABAPENTIN 600 MG: 300 CAPSULE ORAL at 08:27

## 2025-06-14 RX ADMIN — PREDNISONE 5 MG: 5 TABLET ORAL at 08:27

## 2025-06-14 RX ADMIN — HYDROXYCHLOROQUINE SULFATE 200 MG: 200 TABLET, FILM COATED ORAL at 08:27

## 2025-06-14 RX ADMIN — GUAIFENESIN 600 MG: 600 TABLET ORAL at 21:11

## 2025-06-14 RX ADMIN — METHOCARBAMOL 500 MG: 500 TABLET ORAL at 08:28

## 2025-06-14 NOTE — PROGRESS NOTES
Progress Note - Hospitalist   Name: Nadia Almanzar 65 y.o. female I MRN: [id number]  Unit/Bed#: -01 I Date of Admission: 6/13/2025   Date of Service: 6/14/2025 I Hospital Day: 1    Assessment & Plan  Acute diastolic heart failure (HCC)  POA with SOB for 24 hours and right sided back pain   No formal diagnosis of CHF per patient or chart review, but does take prn lasix 40mg for lower extremity edema, thought to be secondary to her autoimmune diseases  CT chest with cardiomegaly, small pleural effusions and patchy groundglass and alveolar opacities in BLLL. Component of fluid overload. Superimposed infection could be considered   Bnp 300   Last echo 3/2024 normal with EF 60-65%  Repeat Echo with EF 60%, normal systolic function.  Diastolic function is mildly abnormal  Treated with lasix 40mg iv bid   Start Lasix 20 mg daily on 6/15  Cardiology consulted   Would recommend if patient needs future doses of prednisone for flares of her rheumatoid arthritis that she increase her diuretics up to Lasix 40 mg daily during that timeframe of steroid use   I&O, daily weights   Sepsis (HCC)  Met sepsis criteria with RR 23 and WBC 15.3   Source: RLL pneumonia   CT chest consistent with volume overload, cannot rule out superimposed pneumonia   COVID/flu negative  UA negative for a UTI  MRSA and blood cultures pending  Strep/legionella negative  Afebrile, lactic acid normal at 1.6, and procalcitonin normal at 0.07.  Hold off on fluids while in chf exacerbation   Will continue rocephin. Low threshold to discontinue abx if procal remains negative   Chronic right-sided low back pain without sciatica  Continue suboxone 2mg up to TID as needed for back pain, gabapentin, and robaxin   Reviewed PDMP. No red flags   Right lower lobe pneumonia  Noted on CT chest   Procal 0.07 -> 1.55  Continue rocephin   Supportive care  RA (rheumatoid arthritis) (HCC)  Continue hydroxychloroquine  Currently on prednisone taper. Has one week of 5mg  left for RA flare. Will continue   Sjogren's syndrome (HCC)  Continue hydroxychloroquine    Hypokalemia (Resolved: 6/14/2025)  Potassium on admission 3.1  replenished  Trend potassium  Hypomagnesemia (Resolved: 6/14/2025)  Received mag sulfate 2 g IV  Trend magnesium  Essential hypertension  Holding losartan with soft pressures   Stop HCTZ since patient will be on lasix   Pleurisy  Acute on chronic. States current pain is not her normal, daily pain   Pain is reproducible. Worsened on deep inspiration   CT renal stone study with punctate non obstructing renal stone. Likely not the cause of her pain   Likely pleurisy in setting of PNA, continue steroids and anti-inflammatories     VTE Pharmacologic Prophylaxis:   Moderate Risk (Score 3-4) - Pharmacological DVT Prophylaxis Ordered: heparin.    Mobility:   Basic Mobility Inpatient Raw Score: 21  JH-HLM Goal: 6: Walk 10 steps or more  JH-HLM Achieved: 7: Walk 25 feet or more  JH-HLM Goal achieved. Continue to encourage appropriate mobility.    Patient Centered Rounds: I performed bedside rounds with nursing staff today.   Discussions with Specialists or Other Care Team Provider: nursing, cm    Education and Discussions with Family / Patient: Patient declined call to .     Current Length of Stay: 1 day(s)  Current Patient Status: Inpatient   Certification Statement: The patient will continue to require additional inpatient hospital stay due to requiring diuresis and iv antibiotics for pneumonia   Discharge Plan: Anticipate discharge in 24-48 hrs to home.    Code Status: Level 1 - Full Code    Subjective   Patient states that she is still having the back pain that is worsened with deep inspiration. Denies chest pain, shortness of breath or abdominal pain. States that she is now coughing up mucus.    Objective :  Temp:  [96.8 °F (36 °C)-97.9 °F (36.6 °C)] 97.9 °F (36.6 °C)  HR:  [58-67] 58  BP: (111-126)/(64-74) 111/64  Resp:  [18] 18  SpO2:  [92 %-94 %] 92  %  O2 Device: None (Room air)    Body mass index is 27.26 kg/m².     Input and Output Summary (last 24 hours):     Intake/Output Summary (Last 24 hours) at 6/14/2025 0847  Last data filed at 6/14/2025 0832  Gross per 24 hour   Intake 1540 ml   Output 2750 ml   Net -1210 ml       Physical Exam  Vitals reviewed.   Constitutional:       General: She is not in acute distress.     Appearance: Normal appearance. She is not ill-appearing.   HENT:      Head: Normocephalic and atraumatic.      Nose: Nose normal.      Mouth/Throat:      Mouth: Mucous membranes are moist.      Pharynx: Oropharynx is clear.     Eyes:      Extraocular Movements: Extraocular movements intact.      Conjunctiva/sclera: Conjunctivae normal.       Cardiovascular:      Rate and Rhythm: Normal rate and regular rhythm.      Pulses: Normal pulses.      Heart sounds: Normal heart sounds. No murmur heard.  Pulmonary:      Effort: Pulmonary effort is normal. No respiratory distress.      Breath sounds: No wheezing, rhonchi or rales.      Comments: Difficulty taking deep breath due to back pain   Abdominal:      General: Abdomen is flat. Bowel sounds are normal. There is no distension.      Palpations: Abdomen is soft.      Tenderness: There is no abdominal tenderness. There is no guarding.     Musculoskeletal:         General: Normal range of motion.      Cervical back: Normal range of motion.      Right lower leg: Edema present.      Left lower leg: Edema present.     Skin:     General: Skin is warm.     Neurological:      General: No focal deficit present.      Mental Status: She is alert and oriented to person, place, and time. Mental status is at baseline.      Motor: No weakness.     Psychiatric:         Mood and Affect: Mood normal.         Behavior: Behavior normal.         Thought Content: Thought content normal.         Judgment: Judgment normal.         Lines/Drains:        Lab Results: I have reviewed the following results:   Results from last 7  days   Lab Units 06/14/25  0513 06/13/25  0352   WBC Thousand/uL 15.23* 15.30*   HEMOGLOBIN g/dL 11.1* 12.0   HEMATOCRIT % 34.1* 37.5   PLATELETS Thousands/uL 244 286   LYMPHO PCT %  --  11*   MONO PCT %  --  6   EOS PCT %  --  0     Results from last 7 days   Lab Units 06/14/25  0513   SODIUM mmol/L 137   POTASSIUM mmol/L 4.5   CHLORIDE mmol/L 103   CO2 mmol/L 28   BUN mg/dL 23   CREATININE mg/dL 1.10   ANION GAP mmol/L 6   CALCIUM mg/dL 8.8   ALBUMIN g/dL 3.6   TOTAL BILIRUBIN mg/dL 0.91   ALK PHOS U/L 56   ALT U/L 9   AST U/L 11*   GLUCOSE RANDOM mg/dL 92     Results from last 7 days   Lab Units 06/13/25  0352   INR  0.93             Results from last 7 days   Lab Units 06/14/25  0513 06/13/25  0352   LACTIC ACID mmol/L  --  1.6   PROCALCITONIN ng/ml 1.55* 0.07       Recent Cultures (last 7 days):   Results from last 7 days   Lab Units 06/13/25  0639 06/13/25  0434 06/13/25  0428   BLOOD CULTURE   --  Received in Microbiology Lab. Culture in Progress. Received in Microbiology Lab. Culture in Progress.   LEGIONELLA URINARY ANTIGEN  Negative  --   --        Imaging Results Review: I reviewed radiology reports from this admission including: CT abdomen/pelvis.      Last 24 Hours Medication List:     Current Facility-Administered Medications:     acetaminophen (TYLENOL) tablet 650 mg, Q6H PRN    buprenorphine-naloxone (Suboxone) film 8 mg, TID PRN    calcium carbonate (TUMS) chewable tablet 1,000 mg, Daily PRN    cefTRIAXone (ROCEPHIN) IVPB (premix in dextrose) 2,000 mg 50 mL, Q24H, Last Rate: 2,000 mg (06/14/25 0357)    furosemide (LASIX) injection 40 mg, BID    gabapentin (NEURONTIN) capsule 600 mg, BID    heparin (porcine) subcutaneous injection 5,000 Units, Q8H JOHN **AND** [CANCELED] Platelet count, Once    hydroxychloroquine (PLAQUENIL) tablet 200 mg, Daily    ketorolac (TORADOL) injection 15 mg, Q6H PRN    [Held by provider] losartan (COZAAR) tablet 100 mg, Daily    magnesium Oxide (MAG-OX) tablet 400 mg,  Daily    methocarbamol (ROBAXIN) tablet 500 mg, BID PRN    ondansetron (ZOFRAN) injection 4 mg, Q6H PRN    potassium chloride (Klor-Con M20) CR tablet 40 mEq, BID    predniSONE tablet 5 mg, Daily    Administrative Statements   Today, Patient Was Seen By: Jumana Kaiser PA-C      **Please Note: This note may have been constructed using a voice recognition system.**

## 2025-06-14 NOTE — PLAN OF CARE
Problem: PAIN - ADULT  Goal: Verbalizes/displays adequate comfort level or baseline comfort level  Description: Interventions:  - Encourage patient to monitor pain and request assistance  - Assess pain using appropriate pain scale  - Administer analgesics as ordered based on type and severity of pain and evaluate response  - Implement non-pharmacological measures as appropriate and evaluate response  - Consider cultural and social influences on pain and pain management  - Notify physician/advanced practitioner if interventions unsuccessful or patient reports new pain  - Educate patient/family on pain management process including their role and importance of  reporting pain   - Provide non-pharmacologic/complimentary pain relief interventions  Outcome: Progressing     Problem: INFECTION - ADULT  Goal: Absence or prevention of progression during hospitalization  Description: INTERVENTIONS:  - Assess and monitor for signs and symptoms of infection  - Monitor lab/diagnostic results  - Monitor all insertion sites, i.e. indwelling lines, tubes, and drains  - Monitor endotracheal if appropriate and nasal secretions for changes in amount and color  - [place] appropriate cooling/warming therapies per order  - Administer medications as ordered  - Instruct and encourage patient and family to use good hand hygiene technique  - Identify and instruct in appropriate isolation precautions for identified infection/condition  Outcome: Progressing  Goal: Absence of fever/infection during neutropenic period  Description: INTERVENTIONS:  - Monitor WBC  - Perform strict hand hygiene  - Limit to healthy visitors only  - No plants, dried, fresh or silk flowers with parsons in patient room  Outcome: Progressing     Problem: SAFETY ADULT  Goal: Patient will remain free of falls  Description: INTERVENTIONS:  - Educate patient/family on patient safety including physical limitations  - Instruct patient to call for assistance with activity   -  Consider consulting OT/PT to assist with strengthening/mobility based on AM PAC & JH-HLM score  - Consult OT/PT to assist with strengthening/mobility   - Keep Call bell within reach  - Keep bed low and locked with side rails adjusted as appropriate  - Keep care items and personal belongings within reach  - Initiate and maintain comfort rounds  - Make Fall Risk Sign visible to staff  - Offer Toileting every 2 Hours, in advance of need  - Initiate/Maintain bed alarm  - Obtain necessary fall risk management equipment: yellow socks, bracelet  - Apply yellow socks and bracelet for high fall risk patients  - Consider moving patient to room near nurses station  Outcome: Progressing  Goal: Maintain or return to baseline ADL function  Description: INTERVENTIONS:  -  Assess patient's ability to carry out ADLs; assess patient's baseline for ADL function and identify physical deficits which impact ability to perform ADLs (bathing, care of mouth/teeth, toileting, grooming, dressing, etc.)  - Assess/evaluate cause of self-care deficits   - Assess range of motion  - Assess patient's mobility; develop plan if impaired  - Assess patient's need for assistive devices and provide as appropriate  - Encourage maximum independence but intervene and supervise when necessary  - Involve family in performance of ADLs  - Assess for home care needs following discharge   - Consider OT consult to assist with ADL evaluation and planning for discharge  - Provide patient education as appropriate  - Monitor functional capacity and physical performance, use of AM PAC & JH-HLM   - Monitor gait, balance and fatigue with ambulation    Outcome: Progressing  Goal: Maintains/Returns to pre admission functional level  Description: INTERVENTIONS:  - Perform AM-PAC 6 Click Basic Mobility/ Daily Activity assessment daily.  - Set and communicate daily mobility goal to care team and patient/family/caregiver.   - Collaborate with rehabilitation services on  mobility goals if consulted  - Perform Range of Motion 3 times a day.  - Reposition patient every 2 hours.  - Dangle patient 2 times a day  - Stand patient 2 times a day  - Ambulate patient 2 times a day  - Out of bed to chair 2 times a day   - Out of bed for meals 2 times a day  - Out of bed for toileting  - Record patient progress and toleration of activity level   Outcome: Progressing     Problem: DISCHARGE PLANNING  Goal: Discharge to home or other facility with appropriate resources  Description: INTERVENTIONS:  - Identify barriers to discharge w/patient and caregiver  - Arrange for needed discharge resources and transportation as appropriate  - Identify discharge learning needs (meds, wound care, etc.)  - Arrange for interpretive services to assist at discharge as needed  - Refer to Case Management Department for coordinating discharge planning if the patient needs post-hospital services based on physician/advanced practitioner order or complex needs related to functional status, cognitive ability, or social support system  Outcome: Progressing     Problem: Knowledge Deficit  Goal: Patient/family/caregiver demonstrates understanding of disease process, treatment plan, medications, and discharge instructions  Description: Complete learning assessment and assess knowledge base.  Interventions:  - Provide teaching at level of understanding  - Provide teaching via preferred learning methods  Outcome: Progressing     Problem: CARDIOVASCULAR - ADULT  Goal: Maintains optimal cardiac output and hemodynamic stability  Description: INTERVENTIONS:  - Monitor I/O, vital signs and rhythm  - Monitor for S/S and trends of decreased cardiac output  - Administer and titrate ordered vasoactive medications to optimize hemodynamic stability  - Assess quality of pulses, skin color and temperature  - Assess for signs of decreased coronary artery perfusion  - Instruct patient to report change in severity of symptoms  Outcome:  Progressing  Goal: Absence of cardiac dysrhythmias or at baseline rhythm  Description: INTERVENTIONS:  - Continuous cardiac monitoring, vital signs, obtain 12 lead EKG if ordered  - Administer antiarrhythmic and heart rate control medications as ordered  - Monitor electrolytes and administer replacement therapy as ordered  Outcome: Progressing     Problem: RESPIRATORY - ADULT  Goal: Achieves optimal ventilation and oxygenation  Description: INTERVENTIONS:  - Assess for changes in respiratory status  - Assess for changes in mentation and behavior  - Position to facilitate oxygenation and minimize respiratory effort  - Oxygen administered by appropriate delivery if ordered  - Initiate smoking cessation education as indicated  - Encourage broncho-pulmonary hygiene including cough, deep breathe, Incentive Spirometry  - Assess the need for suctioning and aspirate as needed  - Assess and instruct to report SOB or any respiratory difficulty  - Respiratory Therapy support as indicated  Outcome: Progressing

## 2025-06-14 NOTE — ASSESSMENT & PLAN NOTE
Met sepsis criteria with RR 23 and WBC 15.3   Source: RLL pneumonia   CT chest consistent with volume overload, cannot rule out superimposed pneumonia   COVID/flu negative  UA negative for a UTI  MRSA and blood cultures pending  Strep/legionella negative  Afebrile, lactic acid normal at 1.6, and procalcitonin normal at 0.07.  Hold off on fluids while in chf exacerbation   Will continue rocephin. Low threshold to discontinue abx if procal remains negative

## 2025-06-14 NOTE — PLAN OF CARE
Problem: INFECTION - ADULT  Goal: Absence or prevention of progression during hospitalization  Description: INTERVENTIONS:  - Assess and monitor for signs and symptoms of infection  - Monitor lab/diagnostic results  - Monitor all insertion sites, i.e. indwelling lines, tubes, and drains  - Monitor endotracheal if appropriate and nasal secretions for changes in amount and color  - [place] appropriate cooling/warming therapies per order  - Administer medications as ordered  - Instruct and encourage patient and family to use good hand hygiene technique  - Identify and instruct in appropriate isolation precautions for identified infection/condition  Outcome: Progressing

## 2025-06-14 NOTE — ASSESSMENT & PLAN NOTE
Continue suboxone 2mg up to TID as needed for back pain, gabapentin, and robaxin   Reviewed PDMP. No red flags

## 2025-06-14 NOTE — ASSESSMENT & PLAN NOTE
POA with SOB for 24 hours and right sided back pain   No formal diagnosis of CHF per patient or chart review, but does take prn lasix 40mg for lower extremity edema, thought to be secondary to her autoimmune diseases  CT chest with cardiomegaly, small pleural effusions and patchy groundglass and alveolar opacities in BLLL. Component of fluid overload. Superimposed infection could be considered   Bnp 300   Last echo 3/2024 normal with EF 60-65%  Repeat Echo with EF 60%, normal systolic function.  Diastolic function is mildly abnormal  Treated with lasix 40mg iv bid   Start Lasix 20 mg daily on 6/15  Cardiology consulted   Would recommend if patient needs future doses of prednisone for flares of her rheumatoid arthritis that she increase her diuretics up to Lasix 40 mg daily during that timeframe of steroid use   I&O, daily weights

## 2025-06-14 NOTE — ASSESSMENT & PLAN NOTE
Acute on chronic. States current pain is not her normal, daily pain   Pain is reproducible. Worsened on deep inspiration   CT renal stone study with punctate non obstructing renal stone. Likely not the cause of her pain   Likely pleurisy in setting of PNA, continue steroids and anti-inflammatories

## 2025-06-14 NOTE — UTILIZATION REVIEW
Initial Clinical Review    Admission: Date/Time/Statement:   Admission Orders (From admission, onward)       Ordered        06/13/25 0558  INPATIENT ADMISSION  Once                          Orders Placed This Encounter   Procedures    INPATIENT ADMISSION     Standing Status:   Standing     Number of Occurrences:   1     Level of Care:   Med Surg [16]     Estimated length of stay:   More than 2 Midnights     Certification:   I certify that inpatient services are medically necessary for this patient for a duration of greater than two midnights. See H&P and MD Progress Notes for additional information about the patient's course of treatment.     ED Arrival Information       Expected   -    Arrival   6/13/2025 03:31    Acuity   Urgent              Means of arrival   Wheelchair    Escorted by   Spouse    Service   Hospitalist    Admission type   Emergency              Arrival complaint   sob             Chief Complaint   Patient presents with    Shortness of Breath     SOB since earlier today. Stated she has no immunity.        Initial Presentation: 65 y.o. female to ED via WC from home  Present to ED with right back pain and shortness of breath. States that it is different from her regular back pain that she deals with, endorsing right sided flank / low back pain.   Of note, No formal diagnosis of CHF per patient or chart review, but does take prn lasix 40mg for lower extremity edema, thought to be secondary to her autoimmune diseases   PMHX sjogrens syndrome, rheumatoid arthritis, COPD, chronic back pain;  last 2D echo done was 2024 with EF of 60 to 65%.   Admitted to [facility] with DX: Acute diastolic heart failure    on exam: T99; tachypnea; ; pain 9/10; lungs with rales; Difficulty taking deep breath due to back pain; B/L LE edema; WBC 15.30; K 3.1   CT chest with cardiomegaly, small pleural effusions and patchy groundglass and alveolar opacities in BLLL. Component of fluid overload. Superimposed infection  could be considered   PLAN: iv abx; iv lasix; monitor labs; pain control; recd suboxone x1; f/u procals; f/u echo; cardiology consulted; f/u blood cx; f/u MRSA; f/u CT - renal stone?      Anticipated Length of Stay/Certification Statement: Patient will be admitted on an inpatient basis with an anticipated length of stay of greater than 2 midnights secondary to volume overload requiring chf workup and iv diuretics.       CARDIOLOGY CONSULT   Assessment: Reports starting a prednisone burst several days ago and shortly after then getting some lower extreme edema and exertional dyspnea.  She presents taking Lasix on an as-needed basis.  She began noticing some back pain on the right side which was worse with deep inspiration or touching the area.    On examination she has signs of volume overload.  There is mild peripheral edema, JVD and right-sided decreased breath sounds. Echocardiogram shows normal ejection fraction and a dilated IVC.    Suspect fluid retention/acute heart failure with preserved ejection fraction secondary to steroid use as well as NSAID use.  Plan: Continue with IV diuresis.  Would recommend if patient needs future doses of prednisone for flares of her rheumatoid arthritis that she increase her diuretics up to Lasix 40 mg daily during that timeframe of steroid use. Maintain strict less than 2000 mg sodium diet. 2 L fluid restriction. Daily standing weights. Record accurate input and output. Maintain potassium between 4, magnesium greater than 2.       Date: 6/14/25      Day 2:   Patient states that she is still having the back pain that is worsened with deep inspiration. States that she is now coughing up mucus. Exam: BP's soft; Procal 0.07 -> 1.55; Strep/legionella negative;  CT renal stone study with punctate non obstructing renal stone. Pain 7-10/10; I/O net -1210; lungs with rales; (+) B/L LE edema; WBC 15.23  Plan: iv abx; iv lasix; monitor labs; pain control; recd suboxone x2; f/u procals; f/u  blood cx; f/u MRSA; f/u CT - renal stone?; Holding losartan;  Stop HCTZ since patient will be on lasix           ED Treatment-Medication Administration from 06/13/2025 0331 to 06/13/2025 0649         Date/Time Order Dose Route Action     06/13/2025 0438 cefTRIAXone (ROCEPHIN) IVPB (premix in dextrose) 1,000 mg 50 mL 1,000 mg Intravenous New Bag     06/13/2025 0435 ketorolac (TORADOL) injection 15 mg 15 mg Intravenous Given     06/13/2025 0509 magnesium sulfate 2 g/50 mL IVPB (premix) 2 g 2 g Intravenous New Bag     06/13/2025 0527 dexamethasone (PF) (DECADRON) injection 6 mg 6 mg Intravenous Given     06/13/2025 0529 furosemide (LASIX) injection 60 mg 60 mg Intravenous Given     06/13/2025 0627 potassium chloride 20 mEq IVPB (premix) 20 mEq Intravenous New Bag            Scheduled Medications:  cefTRIAXone, 2,000 mg, Intravenous, Q24H  [START ON 6/15/2025] furosemide, 20 mg, Oral, Daily  gabapentin, 600 mg, Oral, BID  guaiFENesin, 600 mg, Oral, Q12H JOHN  heparin (porcine), 5,000 Units, Subcutaneous, Q8H JOHN  hydroxychloroquine, 200 mg, Oral, Daily  lidocaine, 1 patch, Topical, Daily  [Held by provider] losartan, 100 mg, Oral, Daily  magnesium Oxide, 400 mg, Oral, Daily  potassium chloride, 40 mEq, Oral, BID  predniSONE, 5 mg, Oral, Daily  furosemide (LASIX) injection 40 mg, Intravenous BID       Continuous IV Infusions: None       PRN Meds:  acetaminophen, 650 mg, Oral, Q6H PRN: 6/13 x1  buprenorphine-naloxone, 8 mg, Sublingual, TID PRN: 6/13 x1; 6/14 x2 so far   calcium carbonate, 1,000 mg, Oral, Daily PRN  ketorolac, 15 mg, Intravenous, Q6H PRN: 6/13 x1; 6/14 x1 so far   methocarbamol, 500 mg, Oral, BID PRN: 6/13 x1; 6/14 x2 so far   ondansetron, 4 mg, Intravenous, Q6H PRN      ED Triage Vitals   Temperature Pulse Respirations Blood Pressure SpO2 Pain Score   06/13/25 0340 06/13/25 0340 06/13/25 0340 06/13/25 0340 06/13/25 0340 06/13/25 0435   99 °F (37.2 °C) 88 20 166/93 95 % 9     Weight (last 2 days)        Date/Time Weight    06/14/25 0820 63.3 (139.6)    06/13/25 1130 64.4 (142)    06/13/25 0833 64.6 (142.4)     Weight: per patient 5lb weight gain since last time she weighed herself at 06/13/25 0833    06/13/25 0340 68.4 (150.8)            Vital Signs (last 3 days)       Date/Time Temp Pulse Resp BP MAP (mmHg) SpO2 O2 Device Patient Position - Orthostatic VS Pain    06/14/25 1020 -- -- -- -- -- -- -- -- 10 - Worst Possible Pain    06/14/25 07:17:24 97.9 °F (36.6 °C) 58 -- 111/64 80 92 % -- -- --    06/14/25 0357 -- -- -- -- -- -- -- -- 7    06/13/25 21:50:22 96.8 °F (36 °C) 64 18 126/74 91 93 % None (Room air) Sitting --    06/13/25 2144 -- -- -- -- -- -- -- -- 6    06/13/25 2012 -- -- -- -- -- -- -- -- No Pain    06/13/25 1743 -- -- -- -- -- -- -- -- 8    06/13/25 14:34:29 96.8 °F (36 °C) 65 18 125/74 91 94 % -- -- --    06/13/25 1130 -- 67 -- 115/70 -- 93 % -- -- --    06/13/25 0800 -- -- -- -- -- 93 % None (Room air) -- 6 06/13/25 06:53:19 97.2 °F (36.2 °C) 73 18 115/70 85 93 % -- -- --    06/13/25 0630 -- 86 20 101/53 74 92 % None (Room air) Sitting --    06/13/25 0600 -- 80 22 98/52 71 91 % None (Room air) Sitting --    06/13/25 0545 -- 80 23 108/55 76 92 % None (Room air) Sitting --    06/13/25 0530 -- 75 20 117/65 86 91 % None (Room air) Sitting --    06/13/25 0500 -- 75 20 116/64 84 91 % None (Room air) Sitting --    06/13/25 0445 -- 79 20 125/78 97 92 % None (Room air) Sitting --    06/13/25 0435 -- -- -- -- -- -- -- -- 9    06/13/25 0430 -- 85 20 136/75 99 97 % None (Room air) Sitting --    06/13/25 0400 -- 82 20 119/78 93 92 % None (Room air) Sitting --    06/13/25 0356 -- -- -- -- -- -- None (Room air) -- --    06/13/25 0340 99 °F (37.2 °C) 88 20 166/93 -- 95 % None (Room air) Sitting --              Pertinent Labs/Diagnostic Test Results:   Radiology:  CT renal stone study abdomen pelvis wo contrast   Final Interpretation by Bobo Bailey MD (06/13 8032)      1.  Punctate nonobstructing right  renal calculus. No hydronephrosis.   2.  Small right and trace left pleural effusions with right greater than left basilar atelectasis, noting somewhat more significant right lower lobe consolidation, for which correlation for infection is suggested.   3.  Few nondilated and top normal loops of small bowel adherent to the anterior abdominal wall, similar to CT 10/14/2022, suggesting adhesions and slow transit through these small bowel loops. No transition point to suggest obstruction, noting somewhat    limited evaluation without IV or oral contrast.         Workstation performed: [id number]         CT chest wo contrast   Final Interpretation by Brent Eli DO (06/13 0700)      Cardiomegaly, interstitial thickening, small pleural effusions, and patchy groundglass and alveolar opacities in the bilateral lower lobes. Consider a component of fluid overload/CHF. Superimposed infection considered in the appropriate clinical setting.    Correlation with the patient's symptoms and laboratory values recommended.      Coronary atherosclerosis, right renal cyst, small hiatal hernia, and other findings as above.         Workstation performed: [id number]         XR chest 1 view portable   ED Interpretation by Debbie Azar DO (06/13 4579)   Right lower lobe infiltrate      Final Interpretation by Michael Rosa MD (06/13 1114)      No acute cardiopulmonary disease.            Workstation performed: [id number]           Cardiology:  Echo complete w/ contrast if indicated   Final Result by Johann Villatoro DO (06/13 1312)        Left Ventricle: Left ventricular cavity size is normal. Wall thickness    is normal. The left ventricular ejection fraction is 60% by visual    estimation. Systolic function is normal. Wall motion is normal. Diastolic    function is mildly abnormal, consistent with grade I (abnormal)    relaxation.     Left Atrium: The atrium is mildly dilated (35-41 mL/m2).     Tricuspid Valve: There is mild  to moderate regurgitation. The estimated    right ventricular systolic pressure is 51.00 mmHg.     IVC/SVC: The right atrial pressure is estimated at 15.0 mmHg. The    inferior vena cava is dilated.         ECG 12 lead   Final Result by Johann Villatoro DO (06/13 0850)   Normal sinus rhythm   Minimal voltage criteria for LVH, may be normal variant ( R in aVL )   ST & T wave abnormality, consider lateral ischemia   Abnormal ECG   Confirmed by Johann Villatoro ([id number]) on 6/13/2025 8:50:50 AM           Results from last 7 days   Lab Units 06/14/25  0513 06/13/25  0352   WBC Thousand/uL 15.23* 15.30*   HEMOGLOBIN g/dL 11.1* 12.0   HEMATOCRIT % 34.1* 37.5   PLATELETS Thousands/uL 244 286         Results from last 7 days   Lab Units 06/14/25  0513 06/13/25  0352   SODIUM mmol/L 137 139   POTASSIUM mmol/L 4.5 3.1*   CHLORIDE mmol/L 103 102   CO2 mmol/L 28 28   ANION GAP mmol/L 6 9   BUN mg/dL 23 16   CREATININE mg/dL 1.10 0.89   EGFR ml/min/1.73sq m 53 69   CALCIUM mg/dL 8.8 9.5   MAGNESIUM mg/dL 2.1 1.7*   PHOSPHORUS mg/dL  --  3.3     Results from last 7 days   Lab Units 06/14/25  0513 06/13/25  0352   AST U/L 11* 15   ALT U/L 9 10   ALK PHOS U/L 56 65   TOTAL PROTEIN g/dL 6.0* 6.6   ALBUMIN g/dL 3.6 4.3   TOTAL BILIRUBIN mg/dL 0.91 0.83         Results from last 7 days   Lab Units 06/14/25  0513 06/13/25  0352   GLUCOSE RANDOM mg/dL 92 100        Results from last 7 days   Lab Units 06/13/25  0352   HS TNI 0HR ng/L 5     Results from last 7 days   Lab Units 06/13/25  0352   D-DIMER QUANTITATIVE ug/ml FEU 0.51*     Results from last 7 days   Lab Units 06/13/25  0352   PROTIME seconds 12.9   INR  0.93   PTT seconds 24         Results from last 7 days   Lab Units 06/14/25  0513 06/13/25  0352   PROCALCITONIN ng/ml 1.55* 0.07     Results from last 7 days   Lab Units 06/13/25  0352   LACTIC ACID mmol/L 1.6      Results from last 7 days   Lab Units 06/13/25  0352   BNP pg/mL 300*        Results from last 7 days   Lab Units  06/13/25  0601   CLARITY UA  Clear   COLOR UA  Yellow   SPEC GRAV UA  1.025   PH UA  6.0   GLUCOSE UA mg/dl Negative   KETONES UA mg/dl Negative   BLOOD UA  Negative   PROTEIN UA mg/dl Negative   NITRITE UA  Negative   BILIRUBIN UA  Negative   UROBILINOGEN UA E.U./dl 0.2   LEUKOCYTES UA  Negative   WBC UA /hpf 1-2*   RBC UA /hpf 0-1*   BACTERIA UA /hpf None Seen   EPITHELIAL CELLS WET PREP /hpf Occasional     Results from last 7 days   Lab Units 06/13/25  0747 06/13/25  0639   STREP PNEUMONIAE ANTIGEN, URINE  Negative  --    LEGIONELLA URINARY ANTIGEN   --  Negative        Results from last 7 days   Lab Units 06/13/25  0434 06/13/25  0428   BLOOD CULTURE  No Growth at 24 hrs. No Growth at 24 hrs.          Past Medical History[1]  Present on Admission:   (Resolved) Hypokalemia   (Resolved) Hypomagnesemia   Sepsis (HCC)   RA (rheumatoid arthritis) (HCC)   Sjogren's syndrome (HCC)   Essential hypertension   Right lower lobe pneumonia      Admitting Diagnosis: Hypokalemia [E87.6]  Hypomagnesemia [E83.42]  Pneumonia [J18.9]  SOB (shortness of breath) [R06.02]  Hypoxia [R09.02]  Age/Sex: 65 y.o. female    Network Utilization Review Department  ATTENTION: Please call with any questions or concerns to [phone] and carefully listen to the prompts so that you are directed to the right person. All voicemails are confidential.   For Discharge needs, contact Care Management DC Support Team at [phone] opt. [phone]  Send all requests for admission clinical reviews, approved or denied determinations and any other requests to dedicated fax number below belonging to the campus where the patient is receiving treatment. List of dedicated fax numbers for the Facilities:  FACILITY NAME UR FAX NUMBER   ADMISSION DENIALS (Administrative/Medical Necessity) [phone]   DISCHARGE SUPPORT TEAM (NETWORK) [phone]   PARENT CHILD HEALTH (Maternity/NICU/Pediatrics) [phone]   [facility] [phone]   [facility]  [facility] [phone]   [facility] [phone]   [facility] [phone]   [facility] [phone]   [facility] [phone]   [facility] [phone]   [facility] [phone]   [facility] [phone]   [facility] [phone]   [facility] [phone]   [facility] [phone]              [1]   Past Medical History:  Diagnosis Date    COPD (chronic obstructive pulmonary disease) (HCC)     Hypertension     Laceration of liver 10/14/2022    REPAIRED 1977    Long term (current) use of systemic steroids 6/1/2017    Lupus     RA (rheumatoid arthritis) (HCC)     Sjogren's disease (HCC)

## 2025-06-15 LAB
ANION GAP SERPL CALCULATED.3IONS-SCNC: 7 MMOL/L (ref 4–13)
BUN SERPL-MCNC: 20 MG/DL (ref 5–25)
CALCIUM SERPL-MCNC: 8.8 MG/DL (ref 8.4–10.2)
CHLORIDE SERPL-SCNC: 103 MMOL/L (ref 96–108)
CO2 SERPL-SCNC: 27 MMOL/L (ref 21–32)
CREAT SERPL-MCNC: 0.91 MG/DL (ref 0.6–1.3)
ERYTHROCYTE [DISTWIDTH] IN BLOOD BY AUTOMATED COUNT: 12.4 % (ref 11.6–15.1)
GFR SERPL CREATININE-BSD FRML MDRD: 67 ML/MIN/1.73SQ M
GLUCOSE SERPL-MCNC: 95 MG/DL (ref 65–140)
HCT VFR BLD AUTO: 37.3 % (ref 34.8–46.1)
HGB BLD-MCNC: 11.9 G/DL (ref 11.5–15.4)
MCH RBC QN AUTO: 29.5 PG (ref 26.8–34.3)
MCHC RBC AUTO-ENTMCNC: 31.9 G/DL (ref 31.4–37.4)
MCV RBC AUTO: 93 FL (ref 82–98)
PLATELET # BLD AUTO: 274 THOUSANDS/UL (ref 149–390)
PMV BLD AUTO: 9.8 FL (ref 8.9–12.7)
POTASSIUM SERPL-SCNC: 4.9 MMOL/L (ref 3.5–5.3)
PROCALCITONIN SERPL-MCNC: 0.66 NG/ML
RBC # BLD AUTO: 4.03 MILLION/UL (ref 3.81–5.12)
SODIUM SERPL-SCNC: 137 MMOL/L (ref 135–147)
WBC # BLD AUTO: 12.95 THOUSAND/UL (ref 4.31–10.16)

## 2025-06-15 PROCEDURE — 85027 COMPLETE CBC AUTOMATED: CPT

## 2025-06-15 PROCEDURE — 84145 PROCALCITONIN (PCT): CPT

## 2025-06-15 PROCEDURE — 99232 SBSQ HOSP IP/OBS MODERATE 35: CPT

## 2025-06-15 PROCEDURE — 80048 BASIC METABOLIC PNL TOTAL CA: CPT

## 2025-06-15 RX ORDER — KETOROLAC TROMETHAMINE 30 MG/ML
15 INJECTION, SOLUTION INTRAMUSCULAR; INTRAVENOUS 3 TIMES DAILY
Status: DISCONTINUED | OUTPATIENT
Start: 2025-06-15 | End: 2025-06-16 | Stop reason: HOSPADM

## 2025-06-15 RX ORDER — PREDNISONE 20 MG/1
40 TABLET ORAL DAILY
Status: DISCONTINUED | OUTPATIENT
Start: 2025-06-15 | End: 2025-06-16 | Stop reason: HOSPADM

## 2025-06-15 RX ADMIN — LIDOCAINE 1 PATCH: 50 PATCH TOPICAL at 10:08

## 2025-06-15 RX ADMIN — KETOROLAC TROMETHAMINE 15 MG: 30 INJECTION, SOLUTION INTRAMUSCULAR at 22:03

## 2025-06-15 RX ADMIN — METHOCARBAMOL 500 MG: 500 TABLET ORAL at 14:09

## 2025-06-15 RX ADMIN — GUAIFENESIN 600 MG: 600 TABLET ORAL at 22:02

## 2025-06-15 RX ADMIN — GABAPENTIN 600 MG: 300 CAPSULE ORAL at 10:08

## 2025-06-15 RX ADMIN — POTASSIUM CHLORIDE 40 MEQ: 1500 TABLET, EXTENDED RELEASE ORAL at 10:08

## 2025-06-15 RX ADMIN — FUROSEMIDE 20 MG: 20 TABLET ORAL at 10:08

## 2025-06-15 RX ADMIN — Medication 400 MG: at 10:08

## 2025-06-15 RX ADMIN — HEPARIN SODIUM 5000 UNITS: 5000 INJECTION INTRAVENOUS; SUBCUTANEOUS at 22:04

## 2025-06-15 RX ADMIN — CEFTRIAXONE 2000 MG: 2 INJECTION, SOLUTION INTRAVENOUS at 04:32

## 2025-06-15 RX ADMIN — HEPARIN SODIUM 5000 UNITS: 5000 INJECTION INTRAVENOUS; SUBCUTANEOUS at 04:32

## 2025-06-15 RX ADMIN — POTASSIUM CHLORIDE 40 MEQ: 1500 TABLET, EXTENDED RELEASE ORAL at 16:59

## 2025-06-15 RX ADMIN — PREDNISONE 40 MG: 20 TABLET ORAL at 10:08

## 2025-06-15 RX ADMIN — KETOROLAC TROMETHAMINE 15 MG: 30 INJECTION, SOLUTION INTRAMUSCULAR at 16:59

## 2025-06-15 RX ADMIN — KETOROLAC TROMETHAMINE 15 MG: 30 INJECTION, SOLUTION INTRAMUSCULAR; INTRAVENOUS at 06:45

## 2025-06-15 RX ADMIN — METHOCARBAMOL 500 MG: 500 TABLET ORAL at 04:32

## 2025-06-15 RX ADMIN — HEPARIN SODIUM 5000 UNITS: 5000 INJECTION INTRAVENOUS; SUBCUTANEOUS at 14:10

## 2025-06-15 RX ADMIN — GUAIFENESIN 600 MG: 600 TABLET ORAL at 10:08

## 2025-06-15 RX ADMIN — GABAPENTIN 600 MG: 300 CAPSULE ORAL at 22:02

## 2025-06-15 RX ADMIN — HYDROXYCHLOROQUINE SULFATE 200 MG: 200 TABLET, FILM COATED ORAL at 10:08

## 2025-06-15 NOTE — PLAN OF CARE
Problem: PAIN - ADULT  Goal: Verbalizes/displays adequate comfort level or baseline comfort level  Description: Interventions:  - Encourage patient to monitor pain and request assistance  - Assess pain using appropriate pain scale  - Administer analgesics as ordered based on type and severity of pain and evaluate response  - Implement non-pharmacological measures as appropriate and evaluate response  - Consider cultural and social influences on pain and pain management  - Notify physician/advanced practitioner if interventions unsuccessful or patient reports new pain  - Educate patient/family on pain management process including their role and importance of  reporting pain   - Provide non-pharmacologic/complimentary pain relief interventions  Outcome: Progressing     Problem: INFECTION - ADULT  Goal: Absence or prevention of progression during hospitalization  Description: INTERVENTIONS:  - Assess and monitor for signs and symptoms of infection  - Monitor lab/diagnostic results  - Monitor all insertion sites, i.e. indwelling lines, tubes, and drains  - Monitor endotracheal if appropriate and nasal secretions for changes in amount and color  - [place] appropriate cooling/warming therapies per order  - Administer medications as ordered  - Instruct and encourage patient and family to use good hand hygiene technique  - Identify and instruct in appropriate isolation precautions for identified infection/condition  Outcome: Progressing  Goal: Absence of fever/infection during neutropenic period  Description: INTERVENTIONS:  - Monitor WBC  - Perform strict hand hygiene  - Limit to healthy visitors only  - No plants, dried, fresh or silk flowers with parsons in patient room  Outcome: Progressing     Problem: SAFETY ADULT  Goal: Patient will remain free of falls  Description: INTERVENTIONS:  - Educate patient/family on patient safety including physical limitations  - Instruct patient to call for assistance with activity   -  Consider consulting OT/PT to assist with strengthening/mobility based on AM PAC & JH-HLM score  - Consult OT/PT to assist with strengthening/mobility   - Keep Call bell within reach  - Keep bed low and locked with side rails adjusted as appropriate  - Keep care items and personal belongings within reach  - Initiate and maintain comfort rounds  - Make Fall Risk Sign visible to staff  - Apply yellow socks and bracelet for high fall risk patients  - Consider moving patient to room near nurses station  Outcome: Progressing  Goal: Maintain or return to baseline ADL function  Description: INTERVENTIONS:  -  Assess patient's ability to carry out ADLs; assess patient's baseline for ADL function and identify physical deficits which impact ability to perform ADLs (bathing, care of mouth/teeth, toileting, grooming, dressing, etc.)  - Assess/evaluate cause of self-care deficits   - Assess range of motion  - Assess patient's mobility; develop plan if impaired  - Assess patient's need for assistive devices and provide as appropriate  - Encourage maximum independence but intervene and supervise when necessary  - Involve family in performance of ADLs  - Assess for home care needs following discharge   - Consider OT consult to assist with ADL evaluation and planning for discharge  - Provide patient education as appropriate  - Monitor functional capacity and physical performance, use of AM PAC & JH-HLM   - Monitor gait, balance and fatigue with ambulation    Outcome: Progressing  Goal: Maintains/Returns to pre admission functional level  Description: INTERVENTIONS:  - Perform AM-PAC 6 Click Basic Mobility/ Daily Activity assessment daily.  - Set and communicate daily mobility goal to care team and patient/family/caregiver.   - Collaborate with rehabilitation services on mobility goals if consulted  - Out of bed for toileting  - Record patient progress and toleration of activity level   Outcome: Progressing     Problem: DISCHARGE  PLANNING  Goal: Discharge to home or other facility with appropriate resources  Description: INTERVENTIONS:  - Identify barriers to discharge w/patient and caregiver  - Arrange for needed discharge resources and transportation as appropriate  - Identify discharge learning needs (meds, wound care, etc.)  - Arrange for interpretive services to assist at discharge as needed  - Refer to Case Management Department for coordinating discharge planning if the patient needs post-hospital services based on physician/advanced practitioner order or complex needs related to functional status, cognitive ability, or social support system  Outcome: Progressing     Problem: Knowledge Deficit  Goal: Patient/family/caregiver demonstrates understanding of disease process, treatment plan, medications, and discharge instructions  Description: Complete learning assessment and assess knowledge base.  Interventions:  - Provide teaching at level of understanding  - Provide teaching via preferred learning methods  Outcome: Progressing     Problem: CARDIOVASCULAR - ADULT  Goal: Maintains optimal cardiac output and hemodynamic stability  Description: INTERVENTIONS:  - Monitor I/O, vital signs and rhythm  - Monitor for S/S and trends of decreased cardiac output  - Administer and titrate ordered vasoactive medications to optimize hemodynamic stability  - Assess quality of pulses, skin color and temperature  - Assess for signs of decreased coronary artery perfusion  - Instruct patient to report change in severity of symptoms  Outcome: Progressing  Goal: Absence of cardiac dysrhythmias or at baseline rhythm  Description: INTERVENTIONS:  - Continuous cardiac monitoring, vital signs, obtain 12 lead EKG if ordered  - Administer antiarrhythmic and heart rate control medications as ordered  - Monitor electrolytes and administer replacement therapy as ordered  Outcome: Progressing     Problem: RESPIRATORY - ADULT  Goal: Achieves optimal ventilation and  oxygenation  Description: INTERVENTIONS:  - Assess for changes in respiratory status  - Assess for changes in mentation and behavior  - Position to facilitate oxygenation and minimize respiratory effort  - Oxygen administered by appropriate delivery if ordered  - Initiate smoking cessation education as indicated  - Encourage broncho-pulmonary hygiene including cough, deep breathe, Incentive Spirometry  - Assess the need for suctioning and aspirate as needed  - Assess and instruct to report SOB or any respiratory difficulty  - Respiratory Therapy support as indicated  Outcome: Progressing

## 2025-06-15 NOTE — ASSESSMENT & PLAN NOTE
Met sepsis criteria with RR 23 and WBC 15.3   Source: RLL pneumonia   CT chest consistent with volume overload, cannot rule out superimposed pneumonia   COVID/flu negative  UA negative for a UTI  MRSA positive and blood cultures NG 24 hours   Strep/legionella negative  Afebrile, lactic acid normal at 1.6, and procalcitonin normal at 0.07.  Hold off on fluids while in chf   Will continue rocephin

## 2025-06-15 NOTE — ASSESSMENT & PLAN NOTE
Holding losartan with soft pressures   Stop HCTZ since patient will be on lasix   Would continue to hold these on discharge and follow up with pcp

## 2025-06-15 NOTE — ASSESSMENT & PLAN NOTE
Continue hydroxychloroquine  Came in on prednisone taper 5mg daily for a few more days  Will restart prednisone taper due to pleurisy

## 2025-06-15 NOTE — ASSESSMENT & PLAN NOTE
States current pain is not her normal, daily pain   Pain is reproducible, palpable. Worsened on deep inspiration   CT renal stone study with punctate non obstructing renal stone. Likely not the cause of her pain   Likely pleurisy in setting of PNA, restart steroid taper and start standing toradol as this helps her pain the most   Likely will need to increase lasix while on taper

## 2025-06-15 NOTE — PROGRESS NOTES
Progress Note - Hospitalist   Name: Nadia Almanzar 65 y.o. female I MRN: [id number]  Unit/Bed#: -01 I Date of Admission: 6/13/2025   Date of Service: 6/15/2025 I Hospital Day: 2    Assessment & Plan  Acute diastolic heart failure (HCC)  POA with SOB for 24 hours and right sided back pain   No formal diagnosis of CHF per patient or chart review, but does take prn lasix 40mg for lower extremity edema, thought to be secondary to her autoimmune diseases  CT chest with cardiomegaly, small pleural effusions and patchy groundglass and alveolar opacities in BLLL. Component of fluid overload. Superimposed infection could be considered   Bnp 300   Last echo 3/2024 normal with EF 60-65%  Repeat Echo with EF 60%, normal systolic function.  Diastolic function is mildly abnormal  Treated with lasix 40mg iv bid   Start Lasix 20 mg daily on 6/15  Cardiology consulted   Would recommend if patient needs future doses of prednisone for flares of her rheumatoid arthritis that she increase her diuretics up to Lasix 40 mg daily during that timeframe of steroid use   I&O, daily weights   Sepsis (HCC)  Met sepsis criteria with RR 23 and WBC 15.3   Source: RLL pneumonia   CT chest consistent with volume overload, cannot rule out superimposed pneumonia   COVID/flu negative  UA negative for a UTI  MRSA positive and blood cultures NG 24 hours   Strep/legionella negative  Afebrile, lactic acid normal at 1.6, and procalcitonin normal at 0.07.  Hold off on fluids while in chf   Will continue rocephin  Chronic right-sided low back pain without sciatica  Continue suboxone 2mg up to TID as needed for back pain, gabapentin, and robaxin   Reviewed PDMP. No red flags   Right lower lobe pneumonia  Noted on CT chest   Procal 0.07 -> 1.55 -> 0.66   Continue rocephin   Supportive care  RA (rheumatoid arthritis) (HCC)  Continue hydroxychloroquine  Came in on prednisone taper 5mg daily for a few more days  Will restart prednisone taper due to  pleurisy   Sjogren's syndrome (HCC)  Continue hydroxychloroquine    Essential hypertension  Holding losartan with soft pressures   Stop HCTZ since patient will be on lasix   Would continue to hold these on discharge and follow up with pcp   Pleurisy  States current pain is not her normal, daily pain   Pain is reproducible, palpable. Worsened on deep inspiration   CT renal stone study with punctate non obstructing renal stone. Likely not the cause of her pain   Likely pleurisy in setting of PNA, restart steroid taper and start standing toradol as this helps her pain the most   Likely will need to increase lasix while on taper    VTE Pharmacologic Prophylaxis:   Moderate Risk (Score 3-4) - Pharmacological DVT Prophylaxis Ordered: heparin.    Mobility:   Basic Mobility Inpatient Raw Score: 21  JH-HLM Goal: 6: Walk 10 steps or more  JH-HLM Achieved: 7: Walk 25 feet or more  JH-HLM Goal achieved. Continue to encourage appropriate mobility.    Patient Centered Rounds: I performed bedside rounds with nursing staff today.   Discussions with Specialists or Other Care Team Provider: nursing, cm    Education and Discussions with Family / Patient: Patient declined call to .     Current Length of Stay: 2 day(s)  Current Patient Status: Inpatient   Certification Statement: The patient will continue to require additional inpatient hospital stay due to pain in her back on deep inspiration requiring adjusting of pain regimen  Discharge Plan: Anticipate discharge tomorrow to home.    Code Status: Level 1 - Full Code    Subjective   Patient states that this morning she had an episode where the pain was severe while she was on the toilet and needed help getting up. States that at rest, the pain is better. Worsens when taking a deep inspiration. States that pain is palpable and reproducible.     Objective :  Temp:  [96.6 °F (35.9 °C)-97.2 °F (36.2 °C)] 97.2 °F (36.2 °C)  HR:  [69-85] 85  BP: (101-119)/(64-67) 119/67  Resp:   [18] 18  SpO2:  [89 %-93 %] 89 %  O2 Device: None (Room air)    Body mass index is 26.83 kg/m².     Input and Output Summary (last 24 hours):     Intake/Output Summary (Last 24 hours) at 6/15/2025 1032  Last data filed at 6/15/2025 0643  Gross per 24 hour   Intake 300 ml   Output 2800 ml   Net -2500 ml       Physical Exam  Vitals reviewed.   Constitutional:       General: She is not in acute distress.     Appearance: Normal appearance. She is not ill-appearing.   HENT:      Head: Normocephalic and atraumatic.      Nose: Nose normal.      Mouth/Throat:      Mouth: Mucous membranes are moist.      Pharynx: Oropharynx is clear.     Eyes:      Extraocular Movements: Extraocular movements intact.      Conjunctiva/sclera: Conjunctivae normal.       Cardiovascular:      Rate and Rhythm: Normal rate and regular rhythm.      Pulses: Normal pulses.      Heart sounds: Normal heart sounds. No murmur heard.  Pulmonary:      Effort: Pulmonary effort is normal. No respiratory distress.      Breath sounds: No wheezing, rhonchi or rales.      Comments: Difficulty taking deep breath due to back pain   Abdominal:      General: Abdomen is flat. Bowel sounds are normal. There is no distension.      Palpations: Abdomen is soft.      Tenderness: There is no abdominal tenderness. There is no guarding.     Musculoskeletal:         General: Normal range of motion.      Cervical back: Normal range of motion.      Right lower leg: Edema present.      Left lower leg: Edema present.     Skin:     General: Skin is warm.     Neurological:      General: No focal deficit present.      Mental Status: She is alert and oriented to person, place, and time. Mental status is at baseline.      Motor: No weakness.     Psychiatric:         Mood and Affect: Mood normal.         Behavior: Behavior normal.         Thought Content: Thought content normal.         Judgment: Judgment normal.           Lines/Drains:      Lab Results: I have reviewed the following  results:   Results from last 7 days   Lab Units 06/15/25  0507 06/14/25  0513 06/13/25  0352   WBC Thousand/uL 12.95*   < > 15.30*   HEMOGLOBIN g/dL 11.9   < > 12.0   HEMATOCRIT % 37.3   < > 37.5   PLATELETS Thousands/uL 274   < > 286   LYMPHO PCT %  --   --  11*   MONO PCT %  --   --  6   EOS PCT %  --   --  0    < > = values in this interval not displayed.     Results from last 7 days   Lab Units 06/15/25  0507 06/14/25 0513   SODIUM mmol/L 137 137   POTASSIUM mmol/L 4.9 4.5   CHLORIDE mmol/L 103 103   CO2 mmol/L 27 28   BUN mg/dL 20 23   CREATININE mg/dL 0.91 1.10   ANION GAP mmol/L 7 6   CALCIUM mg/dL 8.8 8.8   ALBUMIN g/dL  --  3.6   TOTAL BILIRUBIN mg/dL  --  0.91   ALK PHOS U/L  --  56   ALT U/L  --  9   AST U/L  --  11*   GLUCOSE RANDOM mg/dL 95 92     Results from last 7 days   Lab Units 06/13/25  0352   INR  0.93             Results from last 7 days   Lab Units 06/15/25  0507 06/14/25  0513 06/13/25 0352   LACTIC ACID mmol/L  --   --  1.6   PROCALCITONIN ng/ml 0.66* 1.55* 0.07       Recent Cultures (last 7 days):   Results from last 7 days   Lab Units 06/13/25  0639 06/13/25  0434 06/13/25 0428   BLOOD CULTURE   --  No Growth at 48 hrs. No Growth at 48 hrs.   LEGIONELLA URINARY ANTIGEN  Negative  --   --        Last 24 Hours Medication List:     Current Facility-Administered Medications:     acetaminophen (TYLENOL) tablet 650 mg, Q6H PRN    buprenorphine-naloxone (Suboxone) film 8 mg, TID PRN    calcium carbonate (TUMS) chewable tablet 1,000 mg, Daily PRN    cefTRIAXone (ROCEPHIN) IVPB (premix in dextrose) 2,000 mg 50 mL, Q24H, Last Rate: 2,000 mg (06/15/25 0432)    furosemide (LASIX) tablet 20 mg, Daily    gabapentin (NEURONTIN) capsule 600 mg, BID    guaiFENesin (MUCINEX) 12 hr tablet 600 mg, Q12H JOHN    heparin (porcine) subcutaneous injection 5,000 Units, Q8H JOHN **AND** [CANCELED] Platelet count, Once    hydroxychloroquine (PLAQUENIL) tablet 200 mg, Daily    ketorolac (TORADOL) injection 15 mg,  TID    lidocaine (LIDODERM) 5 % patch 1 patch, Daily    [Held by provider] losartan (COZAAR) tablet 100 mg, Daily    magnesium Oxide (MAG-OX) tablet 400 mg, Daily    methocarbamol (ROBAXIN) tablet 500 mg, BID PRN    ondansetron (ZOFRAN) injection 4 mg, Q6H PRN    potassium chloride (Klor-Con M20) CR tablet 40 mEq, BID    predniSONE tablet 40 mg, Daily    Administrative Statements   Today, Patient Was Seen By: Jumana Kaiser PA-C    **Please Note: This note may have been constructed using a voice recognition system.**

## 2025-06-16 VITALS
SYSTOLIC BLOOD PRESSURE: 117 MMHG | HEIGHT: 60 IN | TEMPERATURE: 96.8 F | BODY MASS INDEX: 26.97 KG/M2 | HEART RATE: 60 BPM | OXYGEN SATURATION: 93 % | WEIGHT: 137.4 LBS | RESPIRATION RATE: 18 BRPM | DIASTOLIC BLOOD PRESSURE: 73 MMHG

## 2025-06-16 LAB
ANION GAP SERPL CALCULATED.3IONS-SCNC: 5 MMOL/L (ref 4–13)
BUN SERPL-MCNC: 27 MG/DL (ref 5–25)
CALCIUM SERPL-MCNC: 8.8 MG/DL (ref 8.4–10.2)
CHLORIDE SERPL-SCNC: 102 MMOL/L (ref 96–108)
CO2 SERPL-SCNC: 28 MMOL/L (ref 21–32)
CREAT SERPL-MCNC: 0.84 MG/DL (ref 0.6–1.3)
ERYTHROCYTE [DISTWIDTH] IN BLOOD BY AUTOMATED COUNT: 12 % (ref 11.6–15.1)
GFR SERPL CREATININE-BSD FRML MDRD: 74 ML/MIN/1.73SQ M
GLUCOSE SERPL-MCNC: 99 MG/DL (ref 65–140)
HCT VFR BLD AUTO: 38.6 % (ref 34.8–46.1)
HGB BLD-MCNC: 12.3 G/DL (ref 11.5–15.4)
MCH RBC QN AUTO: 29.7 PG (ref 26.8–34.3)
MCHC RBC AUTO-ENTMCNC: 31.9 G/DL (ref 31.4–37.4)
MCV RBC AUTO: 93 FL (ref 82–98)
PLATELET # BLD AUTO: 305 THOUSANDS/UL (ref 149–390)
PMV BLD AUTO: 9.8 FL (ref 8.9–12.7)
POTASSIUM SERPL-SCNC: 4.4 MMOL/L (ref 3.5–5.3)
POTASSIUM SERPL-SCNC: 5.8 MMOL/L (ref 3.5–5.3)
PROCALCITONIN SERPL-MCNC: 0.48 NG/ML
RBC # BLD AUTO: 4.14 MILLION/UL (ref 3.81–5.12)
SODIUM SERPL-SCNC: 135 MMOL/L (ref 135–147)
WBC # BLD AUTO: 13.35 THOUSAND/UL (ref 4.31–10.16)

## 2025-06-16 PROCEDURE — 80048 BASIC METABOLIC PNL TOTAL CA: CPT

## 2025-06-16 PROCEDURE — 99239 HOSP IP/OBS DSCHRG MGMT >30: CPT | Performed by: STUDENT IN AN ORGANIZED HEALTH CARE EDUCATION/TRAINING PROGRAM

## 2025-06-16 PROCEDURE — 84145 PROCALCITONIN (PCT): CPT

## 2025-06-16 PROCEDURE — 84132 ASSAY OF SERUM POTASSIUM: CPT | Performed by: STUDENT IN AN ORGANIZED HEALTH CARE EDUCATION/TRAINING PROGRAM

## 2025-06-16 PROCEDURE — 85027 COMPLETE CBC AUTOMATED: CPT

## 2025-06-16 RX ORDER — FUROSEMIDE 40 MG/1
40 TABLET ORAL DAILY
Status: DISCONTINUED | OUTPATIENT
Start: 2025-06-16 | End: 2025-06-16 | Stop reason: HOSPADM

## 2025-06-16 RX ORDER — MELOXICAM 15 MG/1
15 TABLET ORAL DAILY
Qty: 10 TABLET | Refills: 0 | Status: ON HOLD | OUTPATIENT
Start: 2025-06-16 | End: 2025-06-26

## 2025-06-16 RX ORDER — PREDNISONE 20 MG/1
TABLET ORAL
Qty: 11 TABLET | Refills: 0 | Status: SHIPPED | OUTPATIENT
Start: 2025-06-17 | End: 2025-06-27

## 2025-06-16 RX ORDER — POTASSIUM CHLORIDE 1500 MG/1
40 TABLET, EXTENDED RELEASE ORAL DAILY
Qty: 60 TABLET | Refills: 0 | Status: ON HOLD | OUTPATIENT
Start: 2025-06-16 | End: 2025-07-16

## 2025-06-16 RX ORDER — CEFDINIR 300 MG/1
300 CAPSULE ORAL EVERY 12 HOURS SCHEDULED
Qty: 2 CAPSULE | Refills: 0 | Status: SHIPPED | OUTPATIENT
Start: 2025-06-17 | End: 2025-06-18

## 2025-06-16 RX ORDER — LIDOCAINE 50 MG/G
1 PATCH TOPICAL DAILY
Qty: 10 PATCH | Refills: 0 | Status: ON HOLD | OUTPATIENT
Start: 2025-06-17

## 2025-06-16 RX ORDER — CALCIUM CARBONATE 500 MG/1
1000 TABLET, CHEWABLE ORAL DAILY PRN
Qty: 60 TABLET | Refills: 0 | Status: ON HOLD | OUTPATIENT
Start: 2025-06-16

## 2025-06-16 RX ORDER — GUAIFENESIN 600 MG/1
600 TABLET, EXTENDED RELEASE ORAL EVERY 12 HOURS SCHEDULED
Qty: 20 TABLET | Refills: 0 | Status: SHIPPED | OUTPATIENT
Start: 2025-06-16 | End: 2025-06-26

## 2025-06-16 RX ORDER — FUROSEMIDE 40 MG/1
40 TABLET ORAL DAILY
Qty: 14 TABLET | Refills: 0 | Status: ON HOLD | OUTPATIENT
Start: 2025-06-16 | End: 2025-06-30

## 2025-06-16 RX ORDER — ACETAMINOPHEN 325 MG/1
650 TABLET ORAL EVERY 6 HOURS PRN
Qty: 60 TABLET | Refills: 0 | Status: ON HOLD | OUTPATIENT
Start: 2025-06-16

## 2025-06-16 RX ORDER — ALBUTEROL SULFATE 90 UG/1
2 INHALANT RESPIRATORY (INHALATION) EVERY 4 HOURS PRN
Status: DISCONTINUED | OUTPATIENT
Start: 2025-06-16 | End: 2025-06-16 | Stop reason: HOSPADM

## 2025-06-16 RX ADMIN — PREDNISONE 40 MG: 20 TABLET ORAL at 08:46

## 2025-06-16 RX ADMIN — GABAPENTIN 600 MG: 300 CAPSULE ORAL at 08:45

## 2025-06-16 RX ADMIN — KETOROLAC TROMETHAMINE 15 MG: 30 INJECTION, SOLUTION INTRAMUSCULAR at 08:46

## 2025-06-16 RX ADMIN — CEFTRIAXONE 2000 MG: 2 INJECTION, SOLUTION INTRAVENOUS at 04:40

## 2025-06-16 RX ADMIN — GUAIFENESIN 600 MG: 600 TABLET ORAL at 08:46

## 2025-06-16 RX ADMIN — HEPARIN SODIUM 5000 UNITS: 5000 INJECTION INTRAVENOUS; SUBCUTANEOUS at 05:12

## 2025-06-16 RX ADMIN — Medication 400 MG: at 08:45

## 2025-06-16 RX ADMIN — FUROSEMIDE 40 MG: 40 TABLET ORAL at 10:36

## 2025-06-16 RX ADMIN — HYDROXYCHLOROQUINE SULFATE 200 MG: 200 TABLET, FILM COATED ORAL at 08:45

## 2025-06-16 NOTE — PLAN OF CARE
Problem: PAIN - ADULT  Goal: Verbalizes/displays adequate comfort level or baseline comfort level  Description: Interventions:  - Encourage patient to monitor pain and request assistance  - Assess pain using appropriate pain scale  - Administer analgesics as ordered based on type and severity of pain and evaluate response  - Implement non-pharmacological measures as appropriate and evaluate response  - Consider cultural and social influences on pain and pain management  - Notify physician/advanced practitioner if interventions unsuccessful or patient reports new pain  - Educate patient/family on pain management process including their role and importance of  reporting pain   - Provide non-pharmacologic/complimentary pain relief interventions  Outcome: Progressing     Problem: INFECTION - ADULT  Goal: Absence or prevention of progression during hospitalization  Description: INTERVENTIONS:  - Assess and monitor for signs and symptoms of infection  - Monitor lab/diagnostic results  - Monitor all insertion sites, i.e. indwelling lines, tubes, and drains  - Monitor endotracheal if appropriate and nasal secretions for changes in amount and color  - [place] appropriate cooling/warming therapies per order  - Administer medications as ordered  - Instruct and encourage patient and family to use good hand hygiene technique  - Identify and instruct in appropriate isolation precautions for identified infection/condition  Outcome: Progressing  Goal: Absence of fever/infection during neutropenic period  Description: INTERVENTIONS:  - Monitor WBC  - Perform strict hand hygiene  - Limit to healthy visitors only  - No plants, dried, fresh or silk flowers with parsons in patient room  Outcome: Progressing     Problem: SAFETY ADULT  Goal: Patient will remain free of falls  Description: INTERVENTIONS:  - Educate patient/family on patient safety including physical limitations  - Instruct patient to call for assistance with activity   -  Consider consulting OT/PT to assist with strengthening/mobility based on AM PAC & JH-HLM score  - Consult OT/PT to assist with strengthening/mobility   - Keep Call bell within reach  - Keep bed low and locked with side rails adjusted as appropriate  - Keep care items and personal belongings within reach  - Initiate and maintain comfort rounds  - Make Fall Risk Sign visible to staff  - Offer Toileting every 2 Hours, in advance of need  - Initiate/Maintain bed alarm  - Obtain necessary fall risk management equipment walker   - Apply yellow socks and bracelet for high fall risk patients  - Consider moving patient to room near nurses station  Outcome: Progressing  Goal: Maintain or return to baseline ADL function  Description: INTERVENTIONS:  -  Assess patient's ability to carry out ADLs; assess patient's baseline for ADL function and identify physical deficits which impact ability to perform ADLs (bathing, care of mouth/teeth, toileting, grooming, dressing, etc.)  - Assess/evaluate cause of self-care deficits   - Assess range of motion  - Assess patient's mobility; develop plan if impaired  - Assess patient's need for assistive devices and provide as appropriate  - Encourage maximum independence but intervene and supervise when necessary  - Involve family in performance of ADLs  - Assess for home care needs following discharge   - Consider OT consult to assist with ADL evaluation and planning for discharge  - Provide patient education as appropriate  - Monitor functional capacity and physical performance, use of AM PAC & JH-HLM   - Monitor gait, balance and fatigue with ambulation    Outcome: Progressing  Goal: Maintains/Returns to pre admission functional level  Description: INTERVENTIONS:  - Perform AM-PAC 6 Click Basic Mobility/ Daily Activity assessment daily.  - Set and communicate daily mobility goal to care team and patient/family/caregiver.   - Collaborate with rehabilitation services on mobility goals if  consulted  - Perform Range of Motion 3 times a day.  - Reposition patient every 2 hours.  - Dangle patient 3 times a day  - Stand patient 3 times a day  - Ambulate patient 3 times a day  - Out of bed to chair 3 times a day   - Out of bed for meals 3 times a day  - Out of bed for toileting  - Record patient progress and toleration of activity level   Outcome: Progressing     Problem: DISCHARGE PLANNING  Goal: Discharge to home or other facility with appropriate resources  Description: INTERVENTIONS:  - Identify barriers to discharge w/patient and caregiver  - Arrange for needed discharge resources and transportation as appropriate  - Identify discharge learning needs (meds, wound care, etc.)  - Arrange for interpretive services to assist at discharge as needed  - Refer to Case Management Department for coordinating discharge planning if the patient needs post-hospital services based on physician/advanced practitioner order or complex needs related to functional status, cognitive ability, or social support system  Outcome: Progressing     Problem: Knowledge Deficit  Goal: Patient/family/caregiver demonstrates understanding of disease process, treatment plan, medications, and discharge instructions  Description: Complete learning assessment and assess knowledge base.  Interventions:  - Provide teaching at level of understanding  - Provide teaching via preferred learning methods  Outcome: Progressing     Problem: CARDIOVASCULAR - ADULT  Goal: Maintains optimal cardiac output and hemodynamic stability  Description: INTERVENTIONS:  - Monitor I/O, vital signs and rhythm  - Monitor for S/S and trends of decreased cardiac output  - Administer and titrate ordered vasoactive medications to optimize hemodynamic stability  - Assess quality of pulses, skin color and temperature  - Assess for signs of decreased coronary artery perfusion  - Instruct patient to report change in severity of symptoms  Outcome: Progressing  Goal:  Absence of cardiac dysrhythmias or at baseline rhythm  Description: INTERVENTIONS:  - Continuous cardiac monitoring, vital signs, obtain 12 lead EKG if ordered  - Administer antiarrhythmic and heart rate control medications as ordered  - Monitor electrolytes and administer replacement therapy as ordered  Outcome: Progressing     Problem: RESPIRATORY - ADULT  Goal: Achieves optimal ventilation and oxygenation  Description: INTERVENTIONS:  - Assess for changes in respiratory status  - Assess for changes in mentation and behavior  - Position to facilitate oxygenation and minimize respiratory effort  - Oxygen administered by appropriate delivery if ordered  - Initiate smoking cessation education as indicated  - Encourage broncho-pulmonary hygiene including cough, deep breathe, Incentive Spirometry  - Assess the need for suctioning and aspirate as needed  - Assess and instruct to report SOB or any respiratory difficulty  - Respiratory Therapy support as indicated  Outcome: Progressing     Problem: Decreased Cardiac Output  Goal: Cardiac output adequate for individual needs  Description: INTERVENTIONS: Monitor for signs and symptoms of decreased cardiac output   - Monitor for dyspnea with exertion and at rest  - Monitor for orthopnea  - Monitor for signs of tachycardia. Place patient on telemetry monitoring.  - Assess patient for jugular vein distention  - Assess patient for lower extremity edema and poor peripheral perfusion   - Auscultate lung sound for Fine bibasilar crackles   - Monitor for cardiac arrythmias   - Administer beta blockers, antiarrhythmic, and blood pressure medications as ordered    Outcome: Progressing     Problem: Impaired Gas Exchange  Goal: Optimize oxygenation and ensure adequate ventilation  Description: INTERVENTIONS: Monitor for signs and symptoms of respiratory distress                - Elevate HOB or use high fowlers to promote lung expansion                - Administer oxygen as ordered to  maintain adequate oxygenation                - Encourage use of IS to promote lung expansion and prevent PN                - Monitor ABGs to assess oxygenation status                - Monitor blood oxygen level to maintain adequate oxygenation                - Encourage cough and deep breathing exercises to promote lung expansion                - Monitor patient's mental status for increased confusion    Outcome: Progressing     Problem: Excess Fluid Volume  Goal: Patient is able to achieve and maintain homeostasis  Description: INTERVENTIONS: Monitor for sign and symptoms of fluid overload  - Evaluate LE edema every shift  - Elevate LE to prevent dependent edema  - Apply SUSANNAH stockings as ordered   - Monitor ankle circumference daily  - Assess for jugular vein distention  - Evaluate provider orders for the CHF diuretic algorithm. Administer diuretics as ordered  - Weigh the patient daily at 0600 and report a weight gain of five pounds or more   - Strict intake and output  - Monitor fluid intake and adhere to fluid restrictions  - Assess lung sounds every shift and as needed  - Monitor vital signs and lab values (CBC, chem, BUN, BNP)  - Measure and document urine output    Outcome: Progressing     Problem: Activity Intolerance  Goal: Patient is able to perform activities within their limitations  Description: INTERVENTIONS:                       -   Alternate periods of activity with periods of rest                 -   Patients is able to maintain normal vitals heart rhythm during activity                 -   Gradually increase activity and exercise as patient can tolerate                 -   Monitor blood pressure and heart before and after exercise                  -   Monitor blood oxygen saturation during activity and apply oxygen as needed    Outcome: Progressing     Problem: Knowledge Deficit  Goal: Patient is able to verbalize understanding of Heart Failure after education  Description:  INTERVENTIONS:  - Educate the patient and family on signs and symptoms of HF  - Provide the patient with HF education and HF zone tool  - Educate on the importance of daily weight in the AM and reporting a weight gain               of 3 or more pounds to their primary care physician  - Monitor for SOB  - Maintain and sodium and fluid restriction  - Educate the patient on the importance of medications such as: diuretics, betablockers,               antiarrhythmics and their purpose, dose, route, side effects and labs               if they are needed    Outcome: Progressing

## 2025-06-16 NOTE — PLAN OF CARE
Problem: PAIN - ADULT  Goal: Verbalizes/displays adequate comfort level or baseline comfort level  Description: Interventions:  - Encourage patient to monitor pain and request assistance  - Assess pain using appropriate pain scale  - Administer analgesics as ordered based on type and severity of pain and evaluate response  - Implement non-pharmacological measures as appropriate and evaluate response  - Consider cultural and social influences on pain and pain management  - Notify physician/advanced practitioner if interventions unsuccessful or patient reports new pain  - Educate patient/family on pain management process including their role and importance of  reporting pain   - Provide non-pharmacologic/complimentary pain relief interventions  6/16/2025 0513 by Imani Bass RN  Outcome: Progressing  6/16/2025 0144 by Imani Bass RN  Outcome: Progressing

## 2025-06-16 NOTE — DISCHARGE SUMMARY
Discharge Summary - Hospitalist   Name: Nadia Almanzar 65 y.o. female I MRN: [id number]  Unit/Bed#: -01 I Date of Admission: 6/13/2025   Date of Service: 6/16/2025 I Hospital Day: 3     Assessment & Plan  Acute diastolic heart failure (HCC)  POA with SOB for 24 hours and right sided back pain   No formal diagnosis of CHF per patient or chart review, but does take prn lasix 40mg for lower extremity edema, thought to be secondary to her autoimmune diseases  CT chest with cardiomegaly, small pleural effusions and patchy groundglass and alveolar opacities in BLLL. Component of fluid overload. Superimposed infection could be considered   Bnp 300   Last echo 3/2024 normal with EF 60-65%  Repeat Echo with EF 60%, normal systolic function.  Diastolic function is mildly abnormal  Treated with lasix 40mg iv bid   Patient was started on Lasix 20 mg daily on 6/15  Increased dose to 40 mg daily on discharge given current prednisone taper  Cardiology consulted   Would recommend if patient needs future doses of prednisone for flares of her rheumatoid arthritis that she increase her diuretics up to Lasix 40 mg daily during that timeframe of steroid use   Sepsis (HCC)  Met sepsis criteria with RR 23 and WBC 15.3   Source: RLL pneumonia   CT chest consistent with volume overload, cannot rule out superimposed pneumonia   COVID/flu negative  UA negative for a UTI  MRSA positive and blood cultures NG 24 hours   Strep/legionella negative  Afebrile, lactic acid normal at 1.6, and procalcitonin normal at 0.07.  Hold off on fluids while in chf   Patient was treated with IV Rocephin while hospitalized and transition to p.o. cefdinir on discharge x 1 day for a total of 5 days of therapy  Chronic right-sided low back pain without sciatica  Continue suboxone 2mg up to TID as needed for back pain, gabapentin, and robaxin   Reviewed PDMP. No red flags   Right lower lobe pneumonia  Noted on CT chest   Procal 0.07 -> 1.55 -> 0.66   As  above  RA (rheumatoid arthritis) (HCC)  Continue hydroxychloroquine  Came in on prednisone taper 5mg daily for a few more days  Continue prednisone taper on discharge  Sjogren's syndrome (HCC)  Continue hydroxychloroquine    Essential hypertension  Holding losartan with soft pressures   Stop HCTZ since patient will be on lasix   Antihypertensive medication discontinued on discharge   Pleurisy  States current pain is not her normal, daily pain   Pain is reproducible, palpable. Worsened on deep inspiration   CT renal stone study with punctate non obstructing renal stone. Likely not the cause of her pain   Likely pleurisy in setting of PNA, restart steroid taper and start standing toradol as this helps her pain the most   Lasix increased while on taper  Patient being discharged on p.o. meloxicam and topical diclofenac gel     Medical Problems       Resolved Problems  Date Reviewed: 8/21/2024          Resolved    Hypokalemia 6/14/2025     Resolved by  Jumana Kaiser PA-C    Hypomagnesemia 6/14/2025     Resolved by  Jumana Kaiser PA-C        Discharging Physician / Practitioner: Fidelina Hernandez MD  PCP: Rubin Mendes MD  Admission Date:   Admission Orders (From admission, onward)       Ordered        06/13/25 0558  INPATIENT ADMISSION  Once                          Discharge Date: 06/16/25    Next Steps for Physician/AP Assuming Care:  Please ensure patient has close follow-up with cardiology on discharge  Please discussed with patient about possibly restarting antihypertensive medication    Test Results Pending at Discharge (will require follow up):  None    Medication Changes for Discharge & Rationale:     See after visit summary for reconciled discharge medications provided to patient and/or family.     Consultations During Hospital Stay:  Cardiology    Procedures Performed:   None    Significant Findings / Test Results:   CT renal stone study abdomen pelvis wo contrast   Final Result by Bobo Bailey MD  (06/13 3979)      1.  Punctate nonobstructing right renal calculus. No hydronephrosis.   2.  Small right and trace left pleural effusions with right greater than left basilar atelectasis, noting somewhat more significant right lower lobe consolidation, for which correlation for infection is suggested.   3.  Few nondilated and top normal loops of small bowel adherent to the anterior abdominal wall, similar to CT 10/14/2022, suggesting adhesions and slow transit through these small bowel loops. No transition point to suggest obstruction, noting somewhat    limited evaluation without IV or oral contrast.         Workstation performed: [id number]         CT chest wo contrast   Final Result by Brent Eli DO (06/13 0700)      Cardiomegaly, interstitial thickening, small pleural effusions, and patchy groundglass and alveolar opacities in the bilateral lower lobes. Consider a component of fluid overload/CHF. Superimposed infection considered in the appropriate clinical setting.    Correlation with the patient's symptoms and laboratory values recommended.      Coronary atherosclerosis, right renal cyst, small hiatal hernia, and other findings as above.         Workstation performed: [id number]         XR chest 1 view portable   ED Interpretation by Debbie Azar DO (06/13 0416)   Right lower lobe infiltrate      Final Result by Michael Rosa MD (06/13 1114)      No acute cardiopulmonary disease.            Workstation performed: [id number]               Incidental Findings:   As above      Hospital Course:   Nadia Almanzar is a 65 y.o. female patient who originally presented to the hospital on 6/13/2025 due to right-sided back pain and shortness of breath.  She reported on admission that symptoms began 1 day prior to admission.  Patient also reported difficulty with breathing due to back pain.  Patient was on Lasix at home and used it as needed and took it 2 days prior to admission on admission patient was  noted to have signs of acute volume overload.  She underwent repeat echo which showed normal EF with abnormal diastolic function.  Cardiology was consulted and recommended Lasix.  Patient was treated with IV Lasix and transition to p.o. Lasix.  Patient was also noted to have sepsis secondary to pneumonia and treated with IV antibiotics.  She was noted to have pleurisy and was started on prednisone, cardiology recommended that while patient was on prednisone Lasix dose should be increased.  Patient's blood pressure remained normotensive therefore antihypertensive medication was discontinued on discharge.  Patient was transition to p.o. antibiotics for treatment of pneumonia.  She was transition to p.o. NSAIDs for treatment of pleurisy on discharge.  Patient was improving throughout hospitalization and was medically stable for discharge home.      Please see above list of diagnoses and related plan for additional information.     Discharge Day Visit / Exam:   Subjective: Patient seen and examined at bedside.  No acute events overnight.  Patient reports that she feels significantly improved compared to admission.  No other acute complaints at this time.  Vitals: Blood Pressure: 117/73 (06/16/25 1036)  Pulse: 60 (06/16/25 1036)  Temperature: (!) 96.8 °F (36 °C) (06/16/25 0717)  Temp Source: Oral (06/14/25 0717)  Respirations: 18 (06/16/25 0717)  Height: 5' (152.4 cm) (06/13/25 1130)  Weight - Scale: 62.3 kg (137 lb 6.4 oz) (06/16/25 0552)  SpO2: 93 % (06/16/25 1036)  Physical Exam  Vitals and nursing note reviewed.   Constitutional:       General: She is not in acute distress.     Appearance: She is not ill-appearing or toxic-appearing.   HENT:      Head: Normocephalic and atraumatic.     Eyes:      Extraocular Movements: Extraocular movements intact.      Pupils: Pupils are equal, round, and reactive to light.       Cardiovascular:      Rate and Rhythm: Normal rate and regular rhythm.   Pulmonary:      Effort:  Pulmonary effort is normal.      Breath sounds: Normal breath sounds.      Comments: Maintaining o2 saturation on room air  Abdominal:      General: There is no distension.      Palpations: Abdomen is soft.      Tenderness: There is no abdominal tenderness.     Musculoskeletal:         General: Swelling and tenderness (R sided back pain) present.      Right lower leg: Edema present.      Left lower leg: Edema present.     Skin:     General: Skin is warm.     Neurological:      General: No focal deficit present.      Mental Status: She is alert and oriented to person, place, and time. Mental status is at baseline.     Psychiatric:         Mood and Affect: Mood normal.         Behavior: Behavior normal.          Discussion with Family: Patient declined call to .     Discharge instructions/Information to patient and family:   See after visit summary for information provided to patient and family.      Provisions for Follow-Up Care:  See after visit summary for information related to follow-up care and any pertinent home health orders.      Mobility at time of Discharge:   Basic Mobility Inpatient Raw Score: 23  JH-HLM Goal: 7: Walk 25 feet or more  JH-HLM Achieved: 2: Bed activities/Dependent transfer  HLM Goal NOT achieved. Continue to encourage mobility in post discharge setting.     Disposition:   Home    Planned Readmission: No    Administrative Statements       **Please Note: This note may have been constructed using a voice recognition system**

## 2025-06-16 NOTE — ASSESSMENT & PLAN NOTE
Continue hydroxychloroquine  Came in on prednisone taper 5mg daily for a few more days  Continue prednisone taper on discharge

## 2025-06-16 NOTE — ASSESSMENT & PLAN NOTE
Met sepsis criteria with RR 23 and WBC 15.3   Source: RLL pneumonia   CT chest consistent with volume overload, cannot rule out superimposed pneumonia   COVID/flu negative  UA negative for a UTI  MRSA positive and blood cultures NG 24 hours   Strep/legionella negative  Afebrile, lactic acid normal at 1.6, and procalcitonin normal at 0.07.  Hold off on fluids while in chf   Patient was treated with IV Rocephin while hospitalized and transition to p.o. cefdinir on discharge x 1 day for a total of 5 days of therapy

## 2025-06-16 NOTE — ASSESSMENT & PLAN NOTE
States current pain is not her normal, daily pain   Pain is reproducible, palpable. Worsened on deep inspiration   CT renal stone study with punctate non obstructing renal stone. Likely not the cause of her pain   Likely pleurisy in setting of PNA, restart steroid taper and start standing toradol as this helps her pain the most   Lasix increased while on taper  Patient being discharged on p.o. meloxicam and topical diclofenac gel

## 2025-06-16 NOTE — ASSESSMENT & PLAN NOTE
Holding losartan with soft pressures   Stop HCTZ since patient will be on lasix   Antihypertensive medication discontinued on discharge

## 2025-06-16 NOTE — UTILIZATION REVIEW
NOTIFICATION OF INPATIENT ADMISSION   AUTHORIZATION REQUEST   SERVICING FACILITY:   [place] [address]  Tax ID: [id number]  NPI: [id number] ATTENDING PROVIDER:  Attending Name and NPI#: Fidelina Hernandez Md [[id number]]  Address: [address] [place] [address]  Phone: [phone]   ADMISSION INFORMATION:  Place of Service: Inpatient [facility]  Place of Service Code: 21  Inpatient Admission Date/Time: 6/13/25  5:58 AM  Discharge Date/Time: No discharge date for patient encounter.  Admitting Diagnosis Code/Description:  Hypokalemia [E87.6]  Hypomagnesemia [E83.42]  Pneumonia [J18.9]  SOB (shortness of breath) [R06.02]  Hypoxia [R09.02]     UTILIZATION REVIEW CONTACT:  Meghana Hernández, Utilization   Network Utilization Review Department  Phone: [phone]  Fax [phone]  Email: [email or web address]  Contact for approvals/pending authorizations, clinical reviews, and discharge.     PHYSICIAN ADVISORY SERVICES:  Medical Necessity Denial & [id number]-cd-[id number] Review  Phone: [phone]  Fax: [phone]  Email: [email or web address]     DISCHARGE SUPPORT TEAM:  For Patients Discharge Needs & Updates  Phone: [phone] opt. 2 Fax: [phone]  Email: [email or web address]

## 2025-06-16 NOTE — ASSESSMENT & PLAN NOTE
POA with SOB for 24 hours and right sided back pain   No formal diagnosis of CHF per patient or chart review, but does take prn lasix 40mg for lower extremity edema, thought to be secondary to her autoimmune diseases  CT chest with cardiomegaly, small pleural effusions and patchy groundglass and alveolar opacities in BLLL. Component of fluid overload. Superimposed infection could be considered   Bnp 300   Last echo 3/2024 normal with EF 60-65%  Repeat Echo with EF 60%, normal systolic function.  Diastolic function is mildly abnormal  Treated with lasix 40mg iv bid   Patient was started on Lasix 20 mg daily on 6/15  Increased dose to 40 mg daily on discharge given current prednisone taper  Cardiology consulted   Would recommend if patient needs future doses of prednisone for flares of her rheumatoid arthritis that she increase her diuretics up to Lasix 40 mg daily during that timeframe of steroid use

## 2025-06-17 NOTE — UTILIZATION REVIEW
NOTIFICATION OF ADMISSION DISCHARGE   This is a Notification of Discharge from [facility]. Please be advised that this patient has been discharge from our facility. Below you will find the admission and discharge date and time including the patient’s disposition.   UTILIZATION REVIEW CONTACT:  Utilization Review Assistants  Network Utilization Review Department  Phone: [phone] x carefully listen to the prompts. All voicemails are confidential.  Email: [email or web address]     ADMISSION INFORMATION  PRESENTATION DATE: 6/13/2025  3:35 AM  OBERVATION ADMISSION DATE: N/A  INPATIENT ADMISSION DATE: 6/13/25  5:58 AM   DISCHARGE DATE: 6/16/2025  1:42 PM   DISPOSITION:Home/Self Care    Network Utilization Review Department  ATTENTION: Please call with any questions or concerns to [phone] and carefully listen to the prompts so that you are directed to the right person. All voicemails are confidential.   For Discharge needs, contact Care Management DC Support Team at [phone] opt. 2  Send all requests for admission clinical reviews, approved or denied determinations and any other requests to dedicated fax number below belonging to the campus where the patient is receiving treatment. List of dedicated fax numbers for the Facilities:  FACILITY NAME UR FAX NUMBER   ADMISSION DENIALS (Administrative/Medical Necessity) [phone]   DISCHARGE SUPPORT TEAM ([facility]) [phone]   PARENT CHILD HEALTH (Maternity/NICU/Pediatrics) [phone]   [facility] [phone]   [facility] [phone]   [facility] [phone]   [facility] [phone]   [facility] [phone]   [facility] [phone]   [facility] [phone]   [facility] [phone]   [facility]  [facility] [phone]   [facility] [phone]   [facility] [phone]   [facility] [phone]

## 2025-06-18 LAB
BACTERIA BLD CULT: NORMAL
BACTERIA BLD CULT: NORMAL

## 2025-06-27 ENCOUNTER — HOSPITAL ENCOUNTER (EMERGENCY)
Facility: HOSPITAL | Age: 65
Discharge: HOME | End: 2025-06-27
Attending: EMERGENCY MEDICINE
Payer: COMMERCIAL

## 2025-06-27 ENCOUNTER — APPOINTMENT (EMERGENCY)
Dept: CT IMAGING | Facility: HOSPITAL | Age: 65
End: 2025-06-27
Payer: COMMERCIAL

## 2025-06-27 ENCOUNTER — APPOINTMENT (EMERGENCY)
Dept: RADIOLOGY | Facility: HOSPITAL | Age: 65
End: 2025-06-27
Payer: COMMERCIAL

## 2025-06-27 VITALS
DIASTOLIC BLOOD PRESSURE: 76 MMHG | RESPIRATION RATE: 35 BRPM | TEMPERATURE: 97 F | SYSTOLIC BLOOD PRESSURE: 131 MMHG | OXYGEN SATURATION: 95 % | HEART RATE: 80 BPM

## 2025-06-27 DIAGNOSIS — R91.1 LUNG NODULE: ICD-10-CM

## 2025-06-27 DIAGNOSIS — J32.9 SINUSITIS: Primary | ICD-10-CM

## 2025-06-27 DIAGNOSIS — J90 PLEURAL EFFUSION: ICD-10-CM

## 2025-06-27 LAB
2HR DELTA HS TROPONIN: 3 NG/L
ALBUMIN SERPL BCG-MCNC: 3.7 G/DL (ref 3.5–5)
ALP SERPL-CCNC: 89 U/L (ref 34–104)
ALT SERPL W P-5'-P-CCNC: 12 U/L (ref 7–52)
ANION GAP SERPL CALCULATED.3IONS-SCNC: 9 MMOL/L (ref 4–13)
AST SERPL W P-5'-P-CCNC: 15 U/L (ref 13–39)
BASOPHILS # BLD AUTO: 0.07 THOUSANDS/ÂΜL (ref 0–0.1)
BASOPHILS NFR BLD AUTO: 1 % (ref 0–1)
BILIRUB SERPL-MCNC: 0.96 MG/DL (ref 0.2–1)
BUN SERPL-MCNC: 15 MG/DL (ref 5–25)
CALCIUM SERPL-MCNC: 8.9 MG/DL (ref 8.4–10.2)
CARDIAC TROPONIN I PNL SERPL HS: 10 NG/L (ref ?–50)
CARDIAC TROPONIN I PNL SERPL HS: 7 NG/L (ref ?–50)
CHLORIDE SERPL-SCNC: 101 MMOL/L (ref 96–108)
CK SERPL-CCNC: 125 U/L (ref 26–192)
CO2 SERPL-SCNC: 29 MMOL/L (ref 21–32)
CREAT SERPL-MCNC: 0.67 MG/DL (ref 0.6–1.3)
EOSINOPHIL # BLD AUTO: 0.23 THOUSAND/ÂΜL (ref 0–0.61)
EOSINOPHIL NFR BLD AUTO: 2 % (ref 0–6)
ERYTHROCYTE [DISTWIDTH] IN BLOOD BY AUTOMATED COUNT: 12 % (ref 11.6–15.1)
GFR SERPL CREATININE-BSD FRML MDRD: 93 ML/MIN/1.73SQ M
GLUCOSE SERPL-MCNC: 77 MG/DL (ref 65–140)
HCT VFR BLD AUTO: 33.9 % (ref 34.8–46.1)
HGB BLD-MCNC: 10.7 G/DL (ref 11.5–15.4)
IMM GRANULOCYTES # BLD AUTO: 0.04 THOUSAND/UL (ref 0–0.2)
IMM GRANULOCYTES NFR BLD AUTO: 0 % (ref 0–2)
LYMPHOCYTES # BLD AUTO: 1.72 THOUSANDS/ÂΜL (ref 0.6–4.47)
LYMPHOCYTES NFR BLD AUTO: 15 % (ref 14–44)
MCH RBC QN AUTO: 29.2 PG (ref 26.8–34.3)
MCHC RBC AUTO-ENTMCNC: 31.6 G/DL (ref 31.4–37.4)
MCV RBC AUTO: 92 FL (ref 82–98)
MONOCYTES # BLD AUTO: 1.03 THOUSAND/ÂΜL (ref 0.17–1.22)
MONOCYTES NFR BLD AUTO: 9 % (ref 4–12)
NEUTROPHILS # BLD AUTO: 8.17 THOUSANDS/ÂΜL (ref 1.85–7.62)
NEUTS SEG NFR BLD AUTO: 73 % (ref 43–75)
NRBC BLD AUTO-RTO: 0 /100 WBCS
PLATELET # BLD AUTO: 509 THOUSANDS/UL (ref 149–390)
PMV BLD AUTO: 8.8 FL (ref 8.9–12.7)
POTASSIUM SERPL-SCNC: 2.9 MMOL/L (ref 3.5–5.3)
PROT SERPL-MCNC: 6.4 G/DL (ref 6.4–8.4)
RBC # BLD AUTO: 3.67 MILLION/UL (ref 3.81–5.12)
SODIUM SERPL-SCNC: 139 MMOL/L (ref 135–147)
WBC # BLD AUTO: 11.26 THOUSAND/UL (ref 4.31–10.16)

## 2025-06-27 PROCEDURE — 72125 CT NECK SPINE W/O DYE: CPT

## 2025-06-27 PROCEDURE — 71045 X-RAY EXAM CHEST 1 VIEW: CPT

## 2025-06-27 PROCEDURE — 85025 COMPLETE CBC W/AUTO DIFF WBC: CPT | Performed by: EMERGENCY MEDICINE

## 2025-06-27 PROCEDURE — 99285 EMERGENCY DEPT VISIT HI MDM: CPT

## 2025-06-27 PROCEDURE — 72170 X-RAY EXAM OF PELVIS: CPT

## 2025-06-27 PROCEDURE — 70486 CT MAXILLOFACIAL W/O DYE: CPT

## 2025-06-27 PROCEDURE — 82550 ASSAY OF CK (CPK): CPT | Performed by: EMERGENCY MEDICINE

## 2025-06-27 PROCEDURE — 99284 EMERGENCY DEPT VISIT MOD MDM: CPT | Performed by: EMERGENCY MEDICINE

## 2025-06-27 PROCEDURE — 36415 COLL VENOUS BLD VENIPUNCTURE: CPT | Performed by: EMERGENCY MEDICINE

## 2025-06-27 PROCEDURE — 70450 CT HEAD/BRAIN W/O DYE: CPT

## 2025-06-27 PROCEDURE — 84484 ASSAY OF TROPONIN QUANT: CPT | Performed by: EMERGENCY MEDICINE

## 2025-06-27 PROCEDURE — 80053 COMPREHEN METABOLIC PANEL: CPT | Performed by: EMERGENCY MEDICINE

## 2025-06-27 PROCEDURE — 93005 ELECTROCARDIOGRAM TRACING: CPT

## 2025-06-27 PROCEDURE — 71260 CT THORAX DX C+: CPT

## 2025-06-27 PROCEDURE — 74177 CT ABD & PELVIS W/CONTRAST: CPT

## 2025-06-27 RX ADMIN — AMOXICILLIN AND CLAVULANATE POTASSIUM 1 TABLET: 875; 125 TABLET, FILM COATED ORAL at 22:19

## 2025-06-27 RX ADMIN — IOHEXOL 100 ML: 350 INJECTION, SOLUTION INTRAVENOUS at 20:14
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
## 2025-06-28 NOTE — ED NOTES
Pt ambulated to bathroom with steady gait and no assistance from nurse.      Rah French RN  06/27/25 2026

## 2025-06-28 NOTE — DISCHARGE INSTRUCTIONS
For several incidental findings on the CT scans performed today.  Although there were no traumatic injuries there were the following incidental findings:    1.  Right sided lung nodule.  Please follow-up with your primary care physician for repeat imaging as directed  2.  Right-sided pleural effusion  3.  Sinusitis  4.  Questionable partial small bowel obstruction    Please keep your upcoming appointment with your primary care physician and discuss these findings at that time.  Please return to the emergency room for any new or concerning symptoms, especially fevers, chills, shortness of breath, intractable vomiting, intractable abdominal pain, or any other new or concerning symptom

## 2025-06-29 ENCOUNTER — HOSPITAL ENCOUNTER (INPATIENT)
Facility: HOSPITAL | Age: 65
LOS: 4 days | Discharge: HOME | DRG: 871 | End: 2025-07-03
Attending: EMERGENCY MEDICINE | Admitting: FAMILY MEDICINE
Payer: COMMERCIAL

## 2025-06-29 ENCOUNTER — APPOINTMENT (EMERGENCY)
Dept: RADIOLOGY | Facility: HOSPITAL | Age: 65
DRG: 871 | End: 2025-06-29
Payer: COMMERCIAL

## 2025-06-29 DIAGNOSIS — J90 PLEURAL EFFUSION ON RIGHT: ICD-10-CM

## 2025-06-29 DIAGNOSIS — J32.9 SINUSITIS: ICD-10-CM

## 2025-06-29 DIAGNOSIS — L03.115 CELLULITIS OF RIGHT LOWER EXTREMITY: ICD-10-CM

## 2025-06-29 DIAGNOSIS — J18.9 PNEUMONIA: Primary | ICD-10-CM

## 2025-06-29 LAB
ALBUMIN SERPL BCG-MCNC: 3.9 G/DL (ref 3.5–5)
ALP SERPL-CCNC: 90 U/L (ref 34–104)
ALT SERPL W P-5'-P-CCNC: 11 U/L (ref 7–52)
ANION GAP SERPL CALCULATED.3IONS-SCNC: 8 MMOL/L (ref 4–13)
AST SERPL W P-5'-P-CCNC: 18 U/L (ref 13–39)
BACTERIA UR QL AUTO: ABNORMAL /HPF
BASOPHILS # BLD AUTO: 0.05 THOUSANDS/ÂΜL (ref 0–0.1)
BASOPHILS NFR BLD AUTO: 0 % (ref 0–1)
BILIRUB SERPL-MCNC: 0.79 MG/DL (ref 0.2–1)
BILIRUB UR QL STRIP: NEGATIVE
BNP SERPL-MCNC: 328 PG/ML (ref 0–100)
BUN SERPL-MCNC: 14 MG/DL (ref 5–25)
CALCIUM SERPL-MCNC: 9.2 MG/DL (ref 8.4–10.2)
CARDIAC TROPONIN I PNL SERPL HS: 11 NG/L (ref ?–50)
CHLORIDE SERPL-SCNC: 101 MMOL/L (ref 96–108)
CLARITY UR: CLEAR
CO2 SERPL-SCNC: 28 MMOL/L (ref 21–32)
COLOR UR: YELLOW
CREAT SERPL-MCNC: 0.84 MG/DL (ref 0.6–1.3)
EOSINOPHIL # BLD AUTO: 0.14 THOUSAND/ÂΜL (ref 0–0.61)
EOSINOPHIL NFR BLD AUTO: 1 % (ref 0–6)
ERYTHROCYTE [DISTWIDTH] IN BLOOD BY AUTOMATED COUNT: 12.4 % (ref 11.6–15.1)
FLUAV AG UPPER RESP QL IA.RAPID: NEGATIVE
FLUBV AG UPPER RESP QL IA.RAPID: NEGATIVE
GFR SERPL CREATININE-BSD FRML MDRD: 74 ML/MIN/1.73SQ M
GLUCOSE SERPL-MCNC: 83 MG/DL (ref 65–140)
GLUCOSE UR STRIP-MCNC: NEGATIVE MG/DL
HCT VFR BLD AUTO: 33.9 % (ref 34.8–46.1)
HGB BLD-MCNC: 10.7 G/DL (ref 11.5–15.4)
HGB UR QL STRIP.AUTO: NEGATIVE
IMM GRANULOCYTES # BLD AUTO: 0.16 THOUSAND/UL (ref 0–0.2)
IMM GRANULOCYTES NFR BLD AUTO: 1 % (ref 0–2)
KETONES UR STRIP-MCNC: NEGATIVE MG/DL
LACTATE SERPL-SCNC: 0.9 MMOL/L (ref 0.5–2)
LEUKOCYTE ESTERASE UR QL STRIP: ABNORMAL
LIPASE SERPL-CCNC: 10 U/L (ref 11–82)
LYMPHOCYTES # BLD AUTO: 0.68 THOUSANDS/ÂΜL (ref 0.6–4.47)
LYMPHOCYTES NFR BLD AUTO: 3 % (ref 14–44)
MAGNESIUM SERPL-MCNC: 1.7 MG/DL (ref 1.9–2.7)
MCH RBC QN AUTO: 29.3 PG (ref 26.8–34.3)
MCHC RBC AUTO-ENTMCNC: 31.6 G/DL (ref 31.4–37.4)
MCV RBC AUTO: 93 FL (ref 82–98)
MONOCYTES # BLD AUTO: 1.16 THOUSAND/ÂΜL (ref 0.17–1.22)
MONOCYTES NFR BLD AUTO: 6 % (ref 4–12)
NEUTROPHILS # BLD AUTO: 18.52 THOUSANDS/ÂΜL (ref 1.85–7.62)
NEUTS SEG NFR BLD AUTO: 89 % (ref 43–75)
NITRITE UR QL STRIP: NEGATIVE
NON-SQ EPI CELLS URNS QL MICRO: ABNORMAL /HPF
NRBC BLD AUTO-RTO: 0 /100 WBCS
PH UR STRIP.AUTO: 6.5 [PH]
PLATELET # BLD AUTO: 440 THOUSANDS/UL (ref 149–390)
PMV BLD AUTO: 9 FL (ref 8.9–12.7)
POTASSIUM SERPL-SCNC: 3.4 MMOL/L (ref 3.5–5.3)
PROCALCITONIN SERPL-MCNC: 0.36 NG/ML
PROT SERPL-MCNC: 6.8 G/DL (ref 6.4–8.4)
PROT UR STRIP-MCNC: NEGATIVE MG/DL
RBC # BLD AUTO: 3.65 MILLION/UL (ref 3.81–5.12)
RBC #/AREA URNS AUTO: ABNORMAL /HPF
SARS-COV+SARS-COV-2 AG RESP QL IA.RAPID: NEGATIVE
SODIUM SERPL-SCNC: 137 MMOL/L (ref 135–147)
SP GR UR STRIP.AUTO: <=1.005 (ref 1–1.03)
UROBILINOGEN UR QL STRIP.AUTO: 1 E.U./DL
WBC # BLD AUTO: 20.71 THOUSAND/UL (ref 4.31–10.16)
WBC #/AREA URNS AUTO: ABNORMAL /HPF

## 2025-06-29 PROCEDURE — 71045 X-RAY EXAM CHEST 1 VIEW: CPT

## 2025-06-29 PROCEDURE — 81001 URINALYSIS AUTO W/SCOPE: CPT | Performed by: EMERGENCY MEDICINE

## 2025-06-29 PROCEDURE — 84145 PROCALCITONIN (PCT): CPT | Performed by: EMERGENCY MEDICINE

## 2025-06-29 PROCEDURE — 87811 SARS-COV-2 COVID19 W/OPTIC: CPT | Performed by: EMERGENCY MEDICINE

## 2025-06-29 PROCEDURE — 99285 EMERGENCY DEPT VISIT HI MDM: CPT | Performed by: EMERGENCY MEDICINE

## 2025-06-29 PROCEDURE — 83690 ASSAY OF LIPASE: CPT | Performed by: EMERGENCY MEDICINE

## 2025-06-29 PROCEDURE — 84484 ASSAY OF TROPONIN QUANT: CPT | Performed by: EMERGENCY MEDICINE

## 2025-06-29 PROCEDURE — 87804 INFLUENZA ASSAY W/OPTIC: CPT | Performed by: EMERGENCY MEDICINE

## 2025-06-29 PROCEDURE — 83880 ASSAY OF NATRIURETIC PEPTIDE: CPT | Performed by: EMERGENCY MEDICINE

## 2025-06-29 PROCEDURE — 99223 1ST HOSP IP/OBS HIGH 75: CPT | Performed by: NURSE PRACTITIONER

## 2025-06-29 PROCEDURE — 99285 EMERGENCY DEPT VISIT HI MDM: CPT

## 2025-06-29 PROCEDURE — 83735 ASSAY OF MAGNESIUM: CPT | Performed by: EMERGENCY MEDICINE

## 2025-06-29 PROCEDURE — 83605 ASSAY OF LACTIC ACID: CPT | Performed by: EMERGENCY MEDICINE

## 2025-06-29 PROCEDURE — 87040 BLOOD CULTURE FOR BACTERIA: CPT | Performed by: EMERGENCY MEDICINE

## 2025-06-29 PROCEDURE — 96365 THER/PROPH/DIAG IV INF INIT: CPT

## 2025-06-29 PROCEDURE — 36415 COLL VENOUS BLD VENIPUNCTURE: CPT | Performed by: EMERGENCY MEDICINE

## 2025-06-29 PROCEDURE — 85025 COMPLETE CBC W/AUTO DIFF WBC: CPT | Performed by: EMERGENCY MEDICINE

## 2025-06-29 PROCEDURE — 87086 URINE CULTURE/COLONY COUNT: CPT | Performed by: EMERGENCY MEDICINE

## 2025-06-29 PROCEDURE — 93005 ELECTROCARDIOGRAM TRACING: CPT

## 2025-06-29 PROCEDURE — 80053 COMPREHEN METABOLIC PANEL: CPT | Performed by: EMERGENCY MEDICINE

## 2025-06-29 RX ORDER — HYDROXYCHLOROQUINE SULFATE 200 MG/1
200 TABLET, FILM COATED ORAL DAILY
Status: DISCONTINUED | OUTPATIENT
Start: 2025-06-30 | End: 2025-07-03 | Stop reason: HOSPADM

## 2025-06-29 RX ORDER — POTASSIUM CHLORIDE 1500 MG/1
40 TABLET, EXTENDED RELEASE ORAL ONCE
Status: COMPLETED | OUTPATIENT
Start: 2025-06-30 | End: 2025-06-30

## 2025-06-29 RX ORDER — METHOCARBAMOL 500 MG/1
500 TABLET, FILM COATED ORAL 2 TIMES DAILY
Status: DISCONTINUED | OUTPATIENT
Start: 2025-06-29 | End: 2025-07-03 | Stop reason: HOSPADM

## 2025-06-29 RX ORDER — CEFTRIAXONE 1 G/50ML
1000 INJECTION, SOLUTION INTRAVENOUS EVERY 24 HOURS
Status: DISCONTINUED | OUTPATIENT
Start: 2025-06-30 | End: 2025-07-03 | Stop reason: HOSPADM

## 2025-06-29 RX ORDER — GABAPENTIN 300 MG/1
600 CAPSULE ORAL 2 TIMES DAILY
Status: DISCONTINUED | OUTPATIENT
Start: 2025-06-29 | End: 2025-07-03 | Stop reason: HOSPADM

## 2025-06-29 RX ORDER — ACETAMINOPHEN 325 MG/1
650 TABLET ORAL ONCE
Status: COMPLETED | OUTPATIENT
Start: 2025-06-29 | End: 2025-06-29

## 2025-06-29 RX ORDER — VANCOMYCIN HYDROCHLORIDE 1 G/200ML
15 INJECTION, SOLUTION INTRAVENOUS ONCE
Status: COMPLETED | OUTPATIENT
Start: 2025-06-30 | End: 2025-06-30

## 2025-06-29 RX ORDER — MELOXICAM 7.5 MG/1
15 TABLET ORAL DAILY
Status: DISCONTINUED | OUTPATIENT
Start: 2025-06-30 | End: 2025-07-02

## 2025-06-29 RX ORDER — BUPRENORPHINE AND NALOXONE 8; 2 MG/1; MG/1
8 FILM, SOLUBLE BUCCAL; SUBLINGUAL 2 TIMES DAILY
Status: DISCONTINUED | OUTPATIENT
Start: 2025-06-29 | End: 2025-07-03 | Stop reason: HOSPADM

## 2025-06-29 RX ORDER — CEFTRIAXONE 2 G/50ML
2000 INJECTION, SOLUTION INTRAVENOUS ONCE
Status: COMPLETED | OUTPATIENT
Start: 2025-06-29 | End: 2025-06-29

## 2025-06-29 RX ORDER — METHYLPREDNISOLONE SODIUM SUCCINATE 40 MG/ML
40 INJECTION, POWDER, LYOPHILIZED, FOR SOLUTION INTRAMUSCULAR; INTRAVENOUS DAILY
Status: DISCONTINUED | OUTPATIENT
Start: 2025-06-29 | End: 2025-07-01

## 2025-06-29 RX ORDER — ENOXAPARIN SODIUM 100 MG/ML
40 INJECTION SUBCUTANEOUS DAILY
Status: DISCONTINUED | OUTPATIENT
Start: 2025-06-30 | End: 2025-07-03 | Stop reason: HOSPADM

## 2025-06-29 RX ORDER — LANOLIN ALCOHOL/MO/W.PET/CERES
800 CREAM (GRAM) TOPICAL ONCE
Status: COMPLETED | OUTPATIENT
Start: 2025-06-30 | End: 2025-06-30

## 2025-06-29 RX ADMIN — ACETAMINOPHEN 650 MG: 325 TABLET ORAL at 20:55

## 2025-06-29 RX ADMIN — CEFTRIAXONE 2000 MG: 2 INJECTION, SOLUTION INTRAVENOUS at 20:58

## 2025-06-29 RX ADMIN — SODIUM CHLORIDE 1000 ML: 0.9 INJECTION, SOLUTION INTRAVENOUS at 20:57

## 2025-06-29 RX ADMIN — SODIUM CHLORIDE 1000 ML: 0.9 INJECTION, SOLUTION INTRAVENOUS at 20:58

## 2025-06-30 PROBLEM — G93.41 ACUTE METABOLIC ENCEPHALOPATHY: Status: ACTIVE | Noted: 2025-06-30

## 2025-06-30 PROBLEM — D80.1 HYPOGAMMAGLOBULINEMIA (HCC): Status: ACTIVE | Noted: 2025-06-30

## 2025-06-30 PROBLEM — L03.115 CELLULITIS OF RIGHT LOWER EXTREMITY: Status: ACTIVE | Noted: 2025-06-30

## 2025-06-30 PROBLEM — F11.91 OPIOID USE DISORDER IN REMISSION: Status: ACTIVE | Noted: 2022-12-08

## 2025-06-30 PROBLEM — J90 PLEURAL EFFUSION ON RIGHT: Status: ACTIVE | Noted: 2025-06-30

## 2025-06-30 LAB
ANION GAP SERPL CALCULATED.3IONS-SCNC: 9 MMOL/L (ref 4–13)
ATRIAL RATE: 76 BPM
ATRIAL RATE: 93 BPM
B BURGDOR IGG+IGM SER QL IA: NEGATIVE
BASOPHILS # BLD MANUAL: 0 THOUSAND/UL (ref 0–0.1)
BASOPHILS NFR MAR MANUAL: 0 % (ref 0–1)
BUN SERPL-MCNC: 12 MG/DL (ref 5–25)
CALCIUM SERPL-MCNC: 8.4 MG/DL (ref 8.4–10.2)
CHLORIDE SERPL-SCNC: 108 MMOL/L (ref 96–108)
CO2 SERPL-SCNC: 24 MMOL/L (ref 21–32)
CREAT SERPL-MCNC: 0.62 MG/DL (ref 0.6–1.3)
EOSINOPHIL # BLD MANUAL: 0 THOUSAND/UL (ref 0–0.4)
EOSINOPHIL NFR BLD MANUAL: 0 % (ref 0–6)
ERYTHROCYTE [DISTWIDTH] IN BLOOD BY AUTOMATED COUNT: 12.7 % (ref 11.6–15.1)
GFR SERPL CREATININE-BSD FRML MDRD: 96 ML/MIN/1.73SQ M
GLUCOSE SERPL-MCNC: 99 MG/DL (ref 65–140)
HCT VFR BLD AUTO: 32.1 % (ref 34.8–46.1)
HGB BLD-MCNC: 10.3 G/DL (ref 11.5–15.4)
LYMPHOCYTES # BLD AUTO: 0.25 THOUSAND/UL (ref 0.6–4.47)
LYMPHOCYTES # BLD AUTO: 1 % (ref 14–44)
MAGNESIUM SERPL-MCNC: 1.8 MG/DL (ref 1.9–2.7)
MCH RBC QN AUTO: 29.8 PG (ref 26.8–34.3)
MCHC RBC AUTO-ENTMCNC: 32.1 G/DL (ref 31.4–37.4)
MCV RBC AUTO: 93 FL (ref 82–98)
MONOCYTES # BLD AUTO: 0.75 THOUSAND/UL (ref 0–1.22)
MONOCYTES NFR BLD: 3 % (ref 4–12)
NEUTROPHILS # BLD MANUAL: 24.06 THOUSAND/UL (ref 1.85–7.62)
NEUTS BAND NFR BLD MANUAL: 3 % (ref 0–8)
NEUTS SEG NFR BLD AUTO: 93 % (ref 43–75)
P AXIS: 27 DEGREES
P AXIS: 51 DEGREES
PLATELET # BLD AUTO: 376 THOUSANDS/UL (ref 149–390)
PLATELET BLD QL SMEAR: ABNORMAL
PMV BLD AUTO: 8.9 FL (ref 8.9–12.7)
POTASSIUM SERPL-SCNC: 3.8 MMOL/L (ref 3.5–5.3)
PR INTERVAL: 136 MS
PR INTERVAL: 154 MS
PROCALCITONIN SERPL-MCNC: 3.87 NG/ML
QRS AXIS: 1 DEGREES
QRS AXIS: 8 DEGREES
QRSD INTERVAL: 86 MS
QRSD INTERVAL: 86 MS
QT INTERVAL: 368 MS
QT INTERVAL: 440 MS
QTC INTERVAL: 457 MS
QTC INTERVAL: 495 MS
RBC # BLD AUTO: 3.46 MILLION/UL (ref 3.81–5.12)
RBC MORPH BLD: NORMAL
SODIUM SERPL-SCNC: 141 MMOL/L (ref 135–147)
T WAVE AXIS: 16 DEGREES
T WAVE AXIS: 22 DEGREES
VENTRICULAR RATE: 76 BPM
VENTRICULAR RATE: 93 BPM
WBC # BLD AUTO: 25.06 THOUSAND/UL (ref 4.31–10.16)

## 2025-06-30 PROCEDURE — 87081 CULTURE SCREEN ONLY: CPT | Performed by: FAMILY MEDICINE

## 2025-06-30 PROCEDURE — 85007 BL SMEAR W/DIFF WBC COUNT: CPT | Performed by: NURSE PRACTITIONER

## 2025-06-30 PROCEDURE — 80048 BASIC METABOLIC PNL TOTAL CA: CPT | Performed by: NURSE PRACTITIONER

## 2025-06-30 PROCEDURE — 86618 LYME DISEASE ANTIBODY: CPT | Performed by: NURSE PRACTITIONER

## 2025-06-30 PROCEDURE — 93010 ELECTROCARDIOGRAM REPORT: CPT | Performed by: INTERNAL MEDICINE

## 2025-06-30 PROCEDURE — 99232 SBSQ HOSP IP/OBS MODERATE 35: CPT | Performed by: FAMILY MEDICINE

## 2025-06-30 PROCEDURE — 85027 COMPLETE CBC AUTOMATED: CPT | Performed by: NURSE PRACTITIONER

## 2025-06-30 PROCEDURE — 84145 PROCALCITONIN (PCT): CPT | Performed by: NURSE PRACTITIONER

## 2025-06-30 PROCEDURE — 83735 ASSAY OF MAGNESIUM: CPT | Performed by: NURSE PRACTITIONER

## 2025-06-30 PROCEDURE — 87147 CULTURE TYPE IMMUNOLOGIC: CPT | Performed by: FAMILY MEDICINE

## 2025-06-30 RX ORDER — VANCOMYCIN HYDROCHLORIDE 1 G/200ML
INJECTION, SOLUTION INTRAVENOUS
Status: DISPENSED
Start: 2025-06-30 | End: 2025-06-30

## 2025-06-30 RX ORDER — VANCOMYCIN HYDROCHLORIDE 750 MG/150ML
12.5 INJECTION, SOLUTION INTRAVENOUS EVERY 12 HOURS
Status: DISCONTINUED | OUTPATIENT
Start: 2025-06-30 | End: 2025-07-03 | Stop reason: HOSPADM

## 2025-06-30 RX ORDER — MAGNESIUM SULFATE HEPTAHYDRATE 40 MG/ML
2 INJECTION, SOLUTION INTRAVENOUS ONCE
Status: COMPLETED | OUTPATIENT
Start: 2025-06-30 | End: 2025-06-30

## 2025-06-30 RX ADMIN — VANCOMYCIN HYDROCHLORIDE 750 MG: 750 INJECTION, SOLUTION INTRAVENOUS at 10:50

## 2025-06-30 RX ADMIN — GABAPENTIN 600 MG: 300 CAPSULE ORAL at 17:23

## 2025-06-30 RX ADMIN — METHOCARBAMOL 500 MG: 500 TABLET ORAL at 00:39

## 2025-06-30 RX ADMIN — METHOCARBAMOL 500 MG: 500 TABLET ORAL at 08:50

## 2025-06-30 RX ADMIN — HYDROXYCHLOROQUINE SULFATE 200 MG: 200 TABLET, FILM COATED ORAL at 08:50

## 2025-06-30 RX ADMIN — VANCOMYCIN HYDROCHLORIDE 750 MG: 750 INJECTION, SOLUTION INTRAVENOUS at 21:17

## 2025-06-30 RX ADMIN — Medication 800 MG: at 00:40

## 2025-06-30 RX ADMIN — BUPRENORPHINE AND NALOXONE 8 MG: 8; 2 FILM BUCCAL; SUBLINGUAL at 00:41

## 2025-06-30 RX ADMIN — GABAPENTIN 600 MG: 300 CAPSULE ORAL at 08:50

## 2025-06-30 RX ADMIN — GABAPENTIN 600 MG: 300 CAPSULE ORAL at 00:39

## 2025-06-30 RX ADMIN — MAGNESIUM SULFATE HEPTAHYDRATE 2 G: 40 INJECTION, SOLUTION INTRAVENOUS at 08:45

## 2025-06-30 RX ADMIN — VANCOMYCIN HYDROCHLORIDE 1000 MG: 1 INJECTION, SOLUTION INTRAVENOUS at 00:45

## 2025-06-30 RX ADMIN — CEFTRIAXONE 1000 MG: 1 INJECTION, SOLUTION INTRAVENOUS at 20:43

## 2025-06-30 RX ADMIN — BUPRENORPHINE AND NALOXONE 8 MG: 8; 2 FILM BUCCAL; SUBLINGUAL at 08:50

## 2025-06-30 RX ADMIN — METHYLPREDNISOLONE SODIUM SUCCINATE 40 MG: 40 INJECTION, POWDER, FOR SOLUTION INTRAMUSCULAR; INTRAVENOUS at 08:48

## 2025-06-30 RX ADMIN — METHYLPREDNISOLONE SODIUM SUCCINATE 40 MG: 40 INJECTION, POWDER, FOR SOLUTION INTRAMUSCULAR; INTRAVENOUS at 00:41

## 2025-06-30 RX ADMIN — ENOXAPARIN SODIUM 40 MG: 40 INJECTION SUBCUTANEOUS at 08:49

## 2025-06-30 RX ADMIN — METHOCARBAMOL 500 MG: 500 TABLET ORAL at 17:23

## 2025-06-30 RX ADMIN — MELOXICAM 15 MG: 7.5 TABLET ORAL at 08:50

## 2025-06-30 RX ADMIN — POTASSIUM CHLORIDE 40 MEQ: 1500 TABLET, EXTENDED RELEASE ORAL at 00:39

## 2025-06-30 RX ADMIN — BUPRENORPHINE AND NALOXONE 8 MG: 8; 2 FILM BUCCAL; SUBLINGUAL at 17:23
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
## 2025-06-30 NOTE — ASSESSMENT & PLAN NOTE
Fever 101.5 F, tachycardia, leukocytosis  Patient unaware of fever at home  Normal lactic acid  Source possible RLE cellulitis with wound versus pneumonia  Reports tick exposure in her yard and garden, check Lyme  UA with small leukocytes, mild pyuria.  Culture is in process  Empiric ceftriaxone and vancomycin for Hx MRSA  Trend fever curve, leukocytosis  BLC x2 pending

## 2025-06-30 NOTE — CASE MANAGEMENT
Case Management Assessment & Discharge Planning Note    Patient name Nadia Almanzar  Location /-01 MRN [id number]  : 1960 Date 2025       Current Admission Date: 2025  Current Admission Diagnosis:Sepsis (HCC)   Patient Active Problem List    Diagnosis Date Noted    Hypogammaglobulinemia ([facility]) 2025    Pleural effusion on right 2025    Cellulitis of right lower extremity 2025    Acute metabolic encephalopathy 2025    Pleurisy 2025    Hypokalemia 2025    Hypomagnesemia 2025    Right lower lobe pneumonia 2025    Sepsis ([facility]) 2025    Acute diastolic heart failure ([facility]) 2025    Chronic right-sided low back pain without sciatica 2025    Cellulitis 2024    Cardiomegaly 2023    Iron deficiency 2023    Opioid use disorder in remission 2022    SBO (small bowel obstruction) ([facility]) 10/14/2022    RA (rheumatoid arthritis) ([facility]) 10/14/2022    Pure hypercholesterolemia 2022    Anxiety and depression 2021    Abdominal wall hernia 2021    Sjogren's syndrome ([facility]) 2020    Lupus 2019    Essential hypertension 2018    Gastroesophageal reflux disease without esophagitis 2018    Chronic obstructive pulmonary disease ([facility]) 2018    Relapsing polychondritis 2017      LOS (days): 1  Geometric Mean LOS (GMLOS) (days):   Days to GMLOS:     OBJECTIVE:  PATIENT READMITTED TO HOSPITAL  Risk of Unplanned Readmission Score: 15.02         Current admission status: Inpatient       Preferred Pharmacy:   [facility] [address] [place] [address] [place] - [address]  [address] [facility] [address]  Phone: [phone] Fax: [phone]    Primary Care Provider: Rubin Mendes MD    Primary Insurance: BLUE CROSS  Secondary Insurance:     ASSESSMENT:  Active Health Care Proxies       Wally Almanzar Health Care Representative - Spouse   Primary Phone: [phone]  (Mobile)                 Advance Directives  Does patient have a Health Care POA?: No  Was patient offered paperwork?: Yes (provided to pt)  Does patient currently have a Health Care decision maker?: Yes, please see Health Care Proxy section  Does patient have Advance Directives?: No  Was patient offered paperwork?: Yes (provided to pt)  Primary Contact: Wally Almanzar- spouse         Readmission Root Cause  30 Day Readmission: Yes  During your hospital stay, did someone (provider, nurse, ) explain your care to you in a way you could understand?: Yes  Did you feel medically stable to leave the hospital?: Yes  Were you able to pay for your medication at the pharmacy?: Yes  Did you have reliable transportation to take you to your appointments?: Yes  During previous admission, was a post-acute recommendation made?: No  Patient was readmitted due to: Pt presented to ED with fever, weakness and confusion. Dx: Sepsis  Action Plan: IV ABT, RLE cellulitis with wound vs pneumonia, consult pulmonology for right pleural effusion and consult wound care nurse for RLE, blood cultures pending    Patient Information  Admitted from:: Home  Mental Status: Alert  During Assessment patient was accompanied by: Not accompanied during assessment  Assessment information provided by:: Patient  Primary Caregiver: Self  Support Systems: Self, Spouse/significant other, Son, Family members  [place] of Residence: [facility]  What city do you live in?: [place]  Home entry access options. Select all that apply.: No steps to enter home  Type of Current Residence: 2 story home  Upon entering residence, is there a bedroom on the main floor (no further steps)?: No  A bedroom is located on the following floor levels of residence (select all that apply):: 2nd Floor  Upon entering residence, is there a bathroom on the main floor (no further steps)?: Yes  Number of steps to 2nd floor from main floor: One Flight  Living Arrangements: Lives w/  Spouse/significant other  Is patient a ?: No    Activities of Daily Living Prior to Admission  Functional Status: Independent  Completes ADLs independently?: Yes  Ambulates independently?: Yes  Does patient use assisted devices?: No  Does patient currently own DME?: Yes  What DME does the patient currently own?: [employer]ight [employer], Walker  Does patient have a history of Outpatient Therapy (PT/OT)?: No  Does the patient have a history of Short-Term Rehab?: No  Does patient have a history of HHC?: No  Does patient currently have HHC?: No         Patient Information Continued  Income Source: Pension/long term  Does patient have prescription coverage?: Yes  Can the patient afford their medications and any related supplies (such as glucometers or test strips)?: Yes  Does patient receive dialysis treatments?: No  Does patient have a history of substance abuse?: Yes  Historical substance use preference: Other (Opioids)  History of Withdrawal Symptoms: Denies past symptoms  Is patient currently in treatment for substance abuse?: Yes (on suboxone)  Does patient have a history of Mental Health Diagnosis?: No         Means of Transportation  [facility] Self      Social Determinants of Health (SDOH)      Flowsheet Row Most Recent Value   Housing Stability    In the last 12 months, was there a time when you were not able to pay the mortgage or rent on time? N   In the past 12 months, how many times have you moved where you were living? 0   At any time in the past 12 months, were you homeless or living in a shelter (including now)? N   Transportation Needs    In the past 12 months, has lack of transportation kept you from medical appointments or from getting medications? no   In the past 12 months, has lack of transportation kept you from meetings, work, or from getting things needed for daily living? No   Food Insecurity    Within the past 12 months, you worried that your food would run out before  you got the money to buy more. Never true   Within the past 12 months, the food you bought just didn't last and you didn't have money to get more. Never true   Utilities    In the past 12 months has the electric, gas, oil, or water company threatened to shut off services in your home? No            DISCHARGE DETAILS:    Discharge planning discussed with:: patient    CM met with pt to review role of CM and discuss any supports needed upon discharge. Baseline information obtained, pt indicates she lives at home with her spouse in a 2 story home with no CINTIA. Pt is independnet with ADLs, ambulates without any assistive devices, and drives self.   Freedom of Choice: Yes  Comments - Freedom of Choice: return home    CM contacted family/caregiver?: Yes  Were Treatment Team discharge recommendations reviewed with patient/caregiver?: Yes  Did patient/caregiver verbalize understanding of patient care needs?: Yes  Were patient/caregiver advised of the risks associated with not following Treatment Team discharge recommendations?: Yes    Contacts  Patient Contacts: Wally Almanzar- spouse  Relationship to Patient:: Family  Contact Method: Phone  Phone Number: [phone]  Reason/Outcome: Discharge Planning
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
## 2025-06-30 NOTE — PLAN OF CARE
Problem: PAIN - ADULT  Goal: Verbalizes/displays adequate comfort level or baseline comfort level  Description: Interventions:  - Encourage patient to monitor pain and request assistance  - Assess pain using appropriate pain scale  - Administer analgesics as ordered based on type and severity of pain and evaluate response  - Implement non-pharmacological measures as appropriate and evaluate response  - Consider cultural and social influences on pain and pain management  - Notify physician/advanced practitioner if interventions unsuccessful or patient reports new pain  - Educate patient/family on pain management process including their role and importance of  reporting pain   - Provide non-pharmacologic/complimentary pain relief interventions  Outcome: Progressing     Problem: RESPIRATORY - ADULT  Goal: Achieves optimal ventilation and oxygenation  Description: INTERVENTIONS:  - Assess for changes in respiratory status  - Assess for changes in mentation and behavior  - Position to facilitate oxygenation and minimize respiratory effort  - Oxygen administered by appropriate delivery if ordered  - Initiate smoking cessation education as indicated  - Encourage broncho-pulmonary hygiene including cough, deep breathe, Incentive Spirometry  - Assess the need for suctioning and aspirate as needed  - Assess and instruct to report SOB or any respiratory difficulty  - Respiratory Therapy support as indicated  Outcome: Progressing     Problem: Nutrition/Hydration-ADULT  Goal: Nutrient/Hydration intake appropriate for improving, restoring or maintaining nutritional needs  Description: Monitor and assess patient's nutrition/hydration status for malnutrition. Collaborate with interdisciplinary team and initiate plan and interventions as ordered.  Monitor patient's weight and dietary intake as ordered or per policy. Utilize nutrition screening tool and intervene as necessary. Determine patient's food preferences and provide  high-protein, high-caloric foods as appropriate.     INTERVENTIONS:  - Monitor oral intake, urinary output, labs, and treatment plans  - Assess nutrition and hydration status and recommend course of action  - Evaluate amount of meals eaten  - Assist patient with eating if necessary   - Allow adequate time for meals  - Recommend/ encourage appropriate diets, oral nutritional supplements, and vitamin/mineral supplements  - Order, calculate, and assess calorie counts as needed  - Recommend, monitor, and adjust tube feedings and TPN/PPN based on assessed needs  - Assess need for intravenous fluids  - Provide specific nutrition/hydration education as appropriate  - Include patient/family/caregiver in decisions related to nutrition  Outcome: Progressing
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
## 2025-06-30 NOTE — ASSESSMENT & PLAN NOTE
Hx seropositive rheumatoid arthritis with multiple sites, relapsing polychondritis, systemic lupus erythematosus  Continue PTA hydroxychloroquine 200 mg daily  Receives IV Rituxan, last dose May 2025  Complaining of severe pain in bilateral hands and knees at time of admission stating she feels like she is in a RA flare   Trial Solu-Medrol for pain  Continue PTA gabapentin, Mobic, Robaxin
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
## 2025-06-30 NOTE — PROGRESS NOTES
Nadia Almanzar is a 65 y.o. female who is currently ordered Vancomycin IV with management by the Pharmacy Consult service.  Relevant clinical data and objective / subjective history reviewed.  Vancomycin Assessment:  Indication and Goal AUC/Trough: Soft tissue (goal -600, trough >10)  Clinical Status: New Start   Micro:   Pending   Renal Function:  SCr: 0.62 mg/dL  CrCl: 78.7 mL/min  Renal replacement: Not on dialysis  Days of Therapy: 1  Current Dose: 750 mg IV q 12 hrs  Vancomycin Plan:  New Dosing: continue 750 mg IV q 12 hrs  Estimated AUC: 417 mcg*hr/mL  Estimated Trough: 11.4 mcg/mL  Next Level: 7/2 at 0600  Renal Function Monitoring: Daily BMP and UOP  Pharmacy will continue to follow closely for s/sx of nephrotoxicity, infusion reactions and appropriateness of therapy.  BMP and CBC will be ordered per protocol. We will continue to follow the patient’s culture results and clinical progress daily.    Brent Sim, Pharmacist

## 2025-06-30 NOTE — PLAN OF CARE
Problem: PAIN - ADULT  Goal: Verbalizes/displays adequate comfort level or baseline comfort level  Description: Interventions:  - Encourage patient to monitor pain and request assistance  - Assess pain using appropriate pain scale  - Administer analgesics as ordered based on type and severity of pain and evaluate response  - Implement non-pharmacological measures as appropriate and evaluate response  - Consider cultural and social influences on pain and pain management  - Notify physician/advanced practitioner if interventions unsuccessful or patient reports new pain  - Educate patient/family on pain management process including their role and importance of  reporting pain   - Provide non-pharmacologic/complimentary pain relief interventions  Outcome: Progressing     Problem: INFECTION - ADULT  Goal: Absence or prevention of progression during hospitalization  Description: INTERVENTIONS:  - Assess and monitor for signs and symptoms of infection  - Monitor lab/diagnostic results  - Monitor all insertion sites, i.e. indwelling lines, tubes, and drains  - Monitor endotracheal if appropriate and nasal secretions for changes in amount and color  - [place] appropriate cooling/warming therapies per order  - Administer medications as ordered  - Instruct and encourage patient and family to use good hand hygiene technique  - Identify and instruct in appropriate isolation precautions for identified infection/condition  Outcome: Progressing  Goal: Absence of fever/infection during neutropenic period  Description: INTERVENTIONS:  - Monitor WBC  - Perform strict hand hygiene  - Limit to healthy visitors only  - No plants, dried, fresh or silk flowers with parsons in patient room  Outcome: Progressing     Problem: SAFETY ADULT  Goal: Patient will remain free of falls  Description: INTERVENTIONS:  - Educate patient/family on patient safety including physical limitations  - Instruct patient to call for assistance with activity   -  Consider consulting OT/PT to assist with strengthening/mobility based on AM PAC & JH-HLM score  - Consult OT/PT to assist with strengthening/mobility   - Keep Call bell within reach  - Keep bed low and locked with side rails adjusted as appropriate  - Keep care items and personal belongings within reach  - Initiate and maintain comfort rounds  - Make Fall Risk Sign visible to staff  - Offer Toileting every   Hours, in advance of need  - Initiate/Maintain  alarm  - Obtain necessary fall risk management equipment:    - Apply yellow socks and bracelet for high fall risk patients  - Consider moving patient to room near nurses station  Outcome: Progressing  Goal: Maintain or return to baseline ADL function  Description: INTERVENTIONS:  -  Assess patient's ability to carry out ADLs; assess patient's baseline for ADL function and identify physical deficits which impact ability to perform ADLs (bathing, care of mouth/teeth, toileting, grooming, dressing, etc.)  - Assess/evaluate cause of self-care deficits   - Assess range of motion  - Assess patient's mobility; develop plan if impaired  - Assess patient's need for assistive devices and provide as appropriate  - Encourage maximum independence but intervene and supervise when necessary  - Involve family in performance of ADLs  - Assess for home care needs following discharge   - Consider OT consult to assist with ADL evaluation and planning for discharge  - Provide patient education as appropriate  - Monitor functional capacity and physical performance, use of AM PAC & JH-HLM   - Monitor gait, balance and fatigue with ambulation    Outcome: Progressing  Goal: Maintains/Returns to pre admission functional level  Description: INTERVENTIONS:  - Perform AM-PAC 6 Click Basic Mobility/ Daily Activity assessment daily.  - Set and communicate daily mobility goal to care team and patient/family/caregiver.   - Collaborate with rehabilitation services on mobility goals if consulted  -  Perform Range of Motion   times a day.  - Reposition patient every   hours.  - Dangle patient   times a day  - Stand patient   times a day  - Ambulate patient   times a day  - Out of bed to chair   times a day   - Out of bed for meals   times a day  - Out of bed for toileting  - Record patient progress and toleration of activity level   Outcome: Progressing     Problem: DISCHARGE PLANNING  Goal: Discharge to home or other facility with appropriate resources  Description: INTERVENTIONS:  - Identify barriers to discharge w/patient and caregiver  - Arrange for needed discharge resources and transportation as appropriate  - Identify discharge learning needs (meds, wound care, etc.)  - Arrange for interpretive services to assist at discharge as needed  - Refer to Case Management Department for coordinating discharge planning if the patient needs post-hospital services based on physician/advanced practitioner order or complex needs related to functional status, cognitive ability, or social support system  Outcome: Progressing     Problem: Knowledge Deficit  Goal: Patient/family/caregiver demonstrates understanding of disease process, treatment plan, medications, and discharge instructions  Description: Complete learning assessment and assess knowledge base.  Interventions:  - Provide teaching at level of understanding  - Provide teaching via preferred learning methods  Outcome: Progressing     Problem: RESPIRATORY - ADULT  Goal: Achieves optimal ventilation and oxygenation  Description: INTERVENTIONS:  - Assess for changes in respiratory status  - Assess for changes in mentation and behavior  - Position to facilitate oxygenation and minimize respiratory effort  - Oxygen administered by appropriate delivery if ordered  - Initiate smoking cessation education as indicated  - Encourage broncho-pulmonary hygiene including cough, deep breathe, Incentive Spirometry  - Assess the need for suctioning and aspirate as needed  -  Assess and instruct to report SOB or any respiratory difficulty  - Respiratory Therapy support as indicated  Outcome: Progressing

## 2025-06-30 NOTE — PROGRESS NOTES
Nadia Almanzar is a 65 y.o. female who is currently ordered Vancomycin IV with management by the Pharmacy Consult service.  Relevant clinical data and objective / subjective history reviewed.  Vancomycin Assessment:  Indication and Goal AUC/Trough: Soft tissue (goal -600, trough >10)  Clinical Status: Initial dosing  Micro:   Results pending  Renal Function:  SCr: 0.84 mg/dL  CrCl: 58.1 mL/min  Renal replacement: Not on dialysis  Days of Therapy: 1  Current Dose: 1000mg loading dose  Vancomycin Plan:  New Dosinmg q12h, starting at 0900  Estimated AUC: 468 mcg*hr/mL  Estimated Trough: 17.4 mcg/mL  Next Level: 0600 on   Renal Function Monitoring: Daily BMP and UOP  Pharmacy will continue to follow closely for s/sx of nephrotoxicity, infusion reactions and appropriateness of therapy.  BMP and CBC will be ordered per protocol. We will continue to follow the patient’s culture results and clinical progress daily.    Jose Donohue, Pharmacist

## 2025-06-30 NOTE — UTILIZATION REVIEW
NOTIFICATION OF INPATIENT ADMISSION   AUTHORIZATION REQUEST   SERVICING FACILITY:   [place] [address]  Tax ID: [id number]  NPI: [id number] ATTENDING PROVIDER:  Attending Name and NPI#: Rah Squires Do [[id number]]  Address: [address] [place] [address]  Phone: [phone]   ADMISSION INFORMATION:  Place of Service: Inpatient [facility]  Place of Service Code: 21  Inpatient Admission Date/Time: 6/29/25  9:36 PM  Discharge Date/Time: No discharge date for patient encounter.  Admitting Diagnosis Code/Description:  Pneumonia [J18.9]  Fever [R50.9]     UTILIZATION REVIEW CONTACT:  Meghana Hernández, Utilization   Network Utilization Review Department  Phone: [phone]  Fax [phone]  Email: [email or web address]  Contact for approvals/pending authorizations, clinical reviews, and discharge.     PHYSICIAN ADVISORY SERVICES:  Medical Necessity Denial & Wckw-ct-Xpcy Review  Phone: [phone]  Fax: [phone]  Email: [email or web address]     DISCHARGE SUPPORT TEAM:  For Patients Discharge Needs & Updates  Phone: [phone] opt. 2 Fax: [phone]  Email: [email or web address]

## 2025-06-30 NOTE — ASSESSMENT & PLAN NOTE
Right lower extremity lateral ankle with open wound and surrounding erythema/warmth  Empiric ceftriaxone, vancomycin  Hx MRSA  Wound care consultation appreciated  Trend fever curve and leukocytosis

## 2025-06-30 NOTE — ASSESSMENT & PLAN NOTE
Patient reported yesterday (6/29) accompanied by her  with reports of confusion and disorientation  Patient met SIRS criteria upon presentation  Fever 101.5 F, tachycardia, leukocytosis  Source possible RLE cellulitis with wound versus pneumonia (recent history of)  Reports tick exposure in her yard and garden, check Lyme-pending  UA with small leukocytes, mild pyuria.  Culture is in process  Empiric ceftriaxone and vancomycin for Hx MRSA  Trend fever curve, leukocytosis  BLC x2 pending

## 2025-06-30 NOTE — ASSESSMENT & PLAN NOTE
Possible source of presentation with sepsis  Right lower extremity lateral ankle with open wound and surrounding erythema/warmth  Empiric ceftriaxone, vancomycin  Hx MRSA  Wound care consultation appreciated  Trend fever curve and leukocytosis
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
## 2025-07-01 LAB
ANION GAP SERPL CALCULATED.3IONS-SCNC: 6 MMOL/L (ref 4–13)
BACTERIA UR CULT: NORMAL
BUN SERPL-MCNC: 23 MG/DL (ref 5–25)
CALCIUM SERPL-MCNC: 8.2 MG/DL (ref 8.4–10.2)
CHLORIDE SERPL-SCNC: 106 MMOL/L (ref 96–108)
CO2 SERPL-SCNC: 26 MMOL/L (ref 21–32)
CREAT SERPL-MCNC: 0.71 MG/DL (ref 0.6–1.3)
ERYTHROCYTE [DISTWIDTH] IN BLOOD BY AUTOMATED COUNT: 12.9 % (ref 11.6–15.1)
GFR SERPL CREATININE-BSD FRML MDRD: 90 ML/MIN/1.73SQ M
GLUCOSE SERPL-MCNC: 121 MG/DL (ref 65–140)
HCT VFR BLD AUTO: 28.1 % (ref 34.8–46.1)
HGB BLD-MCNC: 9 G/DL (ref 11.5–15.4)
MAGNESIUM SERPL-MCNC: 2.4 MG/DL (ref 1.9–2.7)
MCH RBC QN AUTO: 29.7 PG (ref 26.8–34.3)
MCHC RBC AUTO-ENTMCNC: 32 G/DL (ref 31.4–37.4)
MCV RBC AUTO: 93 FL (ref 82–98)
MRSA NOSE QL CULT: ABNORMAL
MRSA NOSE QL CULT: ABNORMAL
PLATELET # BLD AUTO: 354 THOUSANDS/UL (ref 149–390)
PMV BLD AUTO: 9.6 FL (ref 8.9–12.7)
POTASSIUM SERPL-SCNC: 3.5 MMOL/L (ref 3.5–5.3)
RBC # BLD AUTO: 3.03 MILLION/UL (ref 3.81–5.12)
SODIUM SERPL-SCNC: 138 MMOL/L (ref 135–147)
WBC # BLD AUTO: 20.25 THOUSAND/UL (ref 4.31–10.16)

## 2025-07-01 PROCEDURE — 99232 SBSQ HOSP IP/OBS MODERATE 35: CPT | Performed by: INTERNAL MEDICINE

## 2025-07-01 PROCEDURE — 85027 COMPLETE CBC AUTOMATED: CPT | Performed by: FAMILY MEDICINE

## 2025-07-01 PROCEDURE — 83735 ASSAY OF MAGNESIUM: CPT | Performed by: FAMILY MEDICINE

## 2025-07-01 PROCEDURE — 80048 BASIC METABOLIC PNL TOTAL CA: CPT | Performed by: FAMILY MEDICINE

## 2025-07-01 RX ORDER — IBUPROFEN 600 MG/1
600 TABLET, FILM COATED ORAL ONCE
Status: COMPLETED | OUTPATIENT
Start: 2025-07-01 | End: 2025-07-01

## 2025-07-01 RX ADMIN — MELOXICAM 15 MG: 7.5 TABLET ORAL at 08:47

## 2025-07-01 RX ADMIN — IBUPROFEN 600 MG: 600 TABLET, FILM COATED ORAL at 21:54

## 2025-07-01 RX ADMIN — METHOCARBAMOL 500 MG: 500 TABLET ORAL at 08:47

## 2025-07-01 RX ADMIN — VANCOMYCIN HYDROCHLORIDE 750 MG: 750 INJECTION, SOLUTION INTRAVENOUS at 21:55

## 2025-07-01 RX ADMIN — BUPRENORPHINE AND NALOXONE 8 MG: 8; 2 FILM BUCCAL; SUBLINGUAL at 08:47

## 2025-07-01 RX ADMIN — ENOXAPARIN SODIUM 40 MG: 40 INJECTION SUBCUTANEOUS at 08:47

## 2025-07-01 RX ADMIN — VANCOMYCIN HYDROCHLORIDE 750 MG: 750 INJECTION, SOLUTION INTRAVENOUS at 08:58

## 2025-07-01 RX ADMIN — METHOCARBAMOL 500 MG: 500 TABLET ORAL at 17:51

## 2025-07-01 RX ADMIN — GABAPENTIN 600 MG: 300 CAPSULE ORAL at 17:51

## 2025-07-01 RX ADMIN — CEFTRIAXONE 1000 MG: 1 INJECTION, SOLUTION INTRAVENOUS at 21:14

## 2025-07-01 RX ADMIN — HYDROXYCHLOROQUINE SULFATE 200 MG: 200 TABLET, FILM COATED ORAL at 08:47

## 2025-07-01 RX ADMIN — GABAPENTIN 600 MG: 300 CAPSULE ORAL at 08:47

## 2025-07-01 RX ADMIN — BUPRENORPHINE AND NALOXONE 8 MG: 8; 2 FILM BUCCAL; SUBLINGUAL at 17:51

## 2025-07-01 NOTE — ASSESSMENT & PLAN NOTE
Patient reported yesterday (6/29) accompanied by her  with reports of confusion and disorientation  Patient met SIRS criteria upon presentation  Fever 101.5 F, tachycardia, leukocytosis  Source possible RLE cellulitis with wound versus pneumonia (recent history of)  Reports tick exposure in her yard and garden, check Lyme-pending  UA with small leukocytes, mild pyuria.  Culture is in process  Empiric ceftriaxone and vancomycin for Hx MRSA.  If MRSA negative, discontinue vancomycin  Trend fever curve, leukocytosis  BLC x2 pending

## 2025-07-01 NOTE — PLAN OF CARE
Problem: PAIN - ADULT  Goal: Verbalizes/displays adequate comfort level or baseline comfort level  Description: Interventions:  - Encourage patient to monitor pain and request assistance  - Assess pain using appropriate pain scale  - Administer analgesics as ordered based on type and severity of pain and evaluate response  - Implement non-pharmacological measures as appropriate and evaluate response  - Consider cultural and social influences on pain and pain management  - Notify physician/advanced practitioner if interventions unsuccessful or patient reports new pain  - Educate patient/family on pain management process including their role and importance of  reporting pain   - Provide non-pharmacologic/complimentary pain relief interventions  Outcome: Progressing     Problem: INFECTION - ADULT  Goal: Absence or prevention of progression during hospitalization  Description: INTERVENTIONS:  - Assess and monitor for signs and symptoms of infection  - Monitor lab/diagnostic results  - Monitor all insertion sites, i.e. indwelling lines, tubes, and drains  - Monitor endotracheal if appropriate and nasal secretions for changes in amount and color  - [place] appropriate cooling/warming therapies per order  - Administer medications as ordered  - Instruct and encourage patient and family to use good hand hygiene technique  - Identify and instruct in appropriate isolation precautions for identified infection/condition  Outcome: Progressing  Goal: Absence of fever/infection during neutropenic period  Description: INTERVENTIONS:  - Monitor WBC  - Perform strict hand hygiene  - Limit to healthy visitors only  - No plants, dried, fresh or silk flowers with parsons in patient room  Outcome: Progressing     Problem: SAFETY ADULT  Goal: Patient will remain free of falls  Description: INTERVENTIONS:  - Educate patient/family on patient safety including physical limitations  - Instruct patient to call for assistance with activity   -  Consider consulting OT/PT to assist with strengthening/mobility based on AM PAC & JH-HLM score  - Consult OT/PT to assist with strengthening/mobility   - Keep Call bell within reach  - Keep bed low and locked with side rails adjusted as appropriate  - Keep care items and personal belongings within reach  - Initiate and maintain comfort rounds  - Make Fall Risk Sign visible to staff  - Offer Toileting every 2 Hours, in advance of need  - Initiate/Maintain bed alarm  - Obtain necessary fall risk management equipment:   - Apply yellow socks and bracelet for high fall risk patients  - Consider moving patient to room near nurses station  Outcome: Progressing  Goal: Maintain or return to baseline ADL function  Description: INTERVENTIONS:  -  Assess patient's ability to carry out ADLs; assess patient's baseline for ADL function and identify physical deficits which impact ability to perform ADLs (bathing, care of mouth/teeth, toileting, grooming, dressing, etc.)  - Assess/evaluate cause of self-care deficits   - Assess range of motion  - Assess patient's mobility; develop plan if impaired  - Assess patient's need for assistive devices and provide as appropriate  - Encourage maximum independence but intervene and supervise when necessary  - Involve family in performance of ADLs  - Assess for home care needs following discharge   - Consider OT consult to assist with ADL evaluation and planning for discharge  - Provide patient education as appropriate  - Monitor functional capacity and physical performance, use of AM PAC & JH-HLM   - Monitor gait, balance and fatigue with ambulation    Outcome: Progressing  Goal: Maintains/Returns to pre admission functional level  Description: INTERVENTIONS:  - Perform AM-PAC 6 Click Basic Mobility/ Daily Activity assessment daily.  - Set and communicate daily mobility goal to care team and patient/family/caregiver.   - Collaborate with rehabilitation services on mobility goals if  consulted  - Perform Range of Motion 3 times a day.  - Reposition patient every 2 hours.  - Dangle patient 3 times a day  - Stand patient 3 times a day  - Ambulate patient 3 times a day  - Out of bed to chair 3 times a day   - Out of bed for meals 3 times a day  - Out of bed for toileting  - Record patient progress and toleration of activity level   Outcome: Progressing     Problem: DISCHARGE PLANNING  Goal: Discharge to home or other facility with appropriate resources  Description: INTERVENTIONS:  - Identify barriers to discharge w/patient and caregiver  - Arrange for needed discharge resources and transportation as appropriate  - Identify discharge learning needs (meds, wound care, etc.)  - Arrange for interpretive services to assist at discharge as needed  - Refer to Case Management Department for coordinating discharge planning if the patient needs post-hospital services based on physician/advanced practitioner order or complex needs related to functional status, cognitive ability, or social support system  Outcome: Progressing     Problem: Knowledge Deficit  Goal: Patient/family/caregiver demonstrates understanding of disease process, treatment plan, medications, and discharge instructions  Description: Complete learning assessment and assess knowledge base.  Interventions:  - Provide teaching at level of understanding  - Provide teaching via preferred learning methods  Outcome: Progressing     Problem: RESPIRATORY - ADULT  Goal: Achieves optimal ventilation and oxygenation  Description: INTERVENTIONS:  - Assess for changes in respiratory status  - Assess for changes in mentation and behavior  - Position to facilitate oxygenation and minimize respiratory effort  - Oxygen administered by appropriate delivery if ordered  - Initiate smoking cessation education as indicated  - Encourage broncho-pulmonary hygiene including cough, deep breathe, Incentive Spirometry  - Assess the need for suctioning and aspirate as  needed  - Assess and instruct to report SOB or any respiratory difficulty  - Respiratory Therapy support as indicated  Outcome: Progressing     Problem: Nutrition/Hydration-ADULT  Goal: Nutrient/Hydration intake appropriate for improving, restoring or maintaining nutritional needs  Description: Monitor and assess patient's nutrition/hydration status for malnutrition. Collaborate with interdisciplinary team and initiate plan and interventions as ordered.  Monitor patient's weight and dietary intake as ordered or per policy. Utilize nutrition screening tool and intervene as necessary. Determine patient's food preferences and provide high-protein, high-caloric foods as appropriate.     INTERVENTIONS:  - Monitor oral intake, urinary output, labs, and treatment plans  - Assess nutrition and hydration status and recommend course of action  - Evaluate amount of meals eaten  - Assist patient with eating if necessary   - Allow adequate time for meals  - Recommend/ encourage appropriate diets, oral nutritional supplements, and vitamin/mineral supplements  - Order, calculate, and assess calorie counts as needed  - Recommend, monitor, and adjust tube feedings and TPN/PPN based on assessed needs  - Assess need for intravenous fluids  - Provide specific nutrition/hydration education as appropriate  - Include patient/family/caregiver in decisions related to nutrition  Outcome: Progressing

## 2025-07-01 NOTE — PLAN OF CARE
Problem: PAIN - ADULT  Goal: Verbalizes/displays adequate comfort level or baseline comfort level  Description: Interventions:  - Encourage patient to monitor pain and request assistance  - Assess pain using appropriate pain scale  - Administer analgesics as ordered based on type and severity of pain and evaluate response  - Implement non-pharmacological measures as appropriate and evaluate response  - Consider cultural and social influences on pain and pain management  - Notify physician/advanced practitioner if interventions unsuccessful or patient reports new pain  - Educate patient/family on pain management process including their role and importance of  reporting pain   - Provide non-pharmacologic/complimentary pain relief interventions  Outcome: Progressing     Problem: INFECTION - ADULT  Goal: Absence or prevention of progression during hospitalization  Description: INTERVENTIONS:  - Assess and monitor for signs and symptoms of infection  - Monitor lab/diagnostic results  - Monitor all insertion sites, i.e. indwelling lines, tubes, and drains  - Monitor endotracheal if appropriate and nasal secretions for changes in amount and color  - [place] appropriate cooling/warming therapies per order  - Administer medications as ordered  - Instruct and encourage patient and family to use good hand hygiene technique  - Identify and instruct in appropriate isolation precautions for identified infection/condition  Outcome: Progressing  Goal: Absence of fever/infection during neutropenic period  Description: INTERVENTIONS:  - Monitor WBC  - Perform strict hand hygiene  - Limit to healthy visitors only  - No plants, dried, fresh or silk flowers with parsons in patient room  Outcome: Progressing     Problem: SAFETY ADULT  Goal: Patient will remain free of falls  Description: INTERVENTIONS:  - Educate patient/family on patient safety including physical limitations  - Instruct patient to call for assistance with activity   -  Consider consulting OT/PT to assist with strengthening/mobility based on AM PAC & JH-HLM score  - Consult OT/PT to assist with strengthening/mobility   - Keep Call bell within reach  - Keep bed low and locked with side rails adjusted as appropriate  - Keep care items and personal belongings within reach  - Initiate and maintain comfort rounds  - Make Fall Risk Sign visible to staff  - Offer Toileting every 2 Hours, in advance of need    - Apply yellow socks and bracelet for high fall risk patients  - Consider moving patient to room near nurses station  Outcome: Progressing  Goal: Maintain or return to baseline ADL function  Description: INTERVENTIONS:  -  Assess patient's ability to carry out ADLs; assess patient's baseline for ADL function and identify physical deficits which impact ability to perform ADLs (bathing, care of mouth/teeth, toileting, grooming, dressing, etc.)  - Assess/evaluate cause of self-care deficits   - Assess range of motion  - Assess patient's mobility; develop plan if impaired  - Assess patient's need for assistive devices and provide as appropriate  - Encourage maximum independence but intervene and supervise when necessary  - Involve family in performance of ADLs  - Assess for home care needs following discharge   - Consider OT consult to assist with ADL evaluation and planning for discharge  - Provide patient education as appropriate  - Monitor functional capacity and physical performance, use of AM PAC & JH-HLM   - Monitor gait, balance and fatigue with ambulation    Outcome: Progressing  Goal: Maintains/Returns to pre admission functional level  Description: INTERVENTIONS:  - Perform AM-PAC 6 Click Basic Mobility/ Daily Activity assessment daily.  - Set and communicate daily mobility goal to care team and patient/family/caregiver.   - Collaborate with rehabilitation services on mobility goals if consulted  - Perform Range of Motion 2 times a day.  - Reposition patient every 2 hours.  -  Dangle patient 3 times a day  - Stand patient 3 times a day  - Ambulate patient 3 times a day  - Out of bed to chair 3 times a day   - Out of bed for meals 3  Problem: DISCHARGE PLANNING  Goal: Discharge to home or other facility with appropriate resources  Description: INTERVENTIONS:  - Identify barriers to discharge w/patient and caregiver  - Arrange for needed discharge resources and transportation as appropriate  - Identify discharge learning needs (meds, wound care, etc.)  - Arrange for interpretive services to assist at discharge as needed  - Refer to Case Management Department for coordinating discharge planning if the patient needs post-hospital services based on physician/advanced practitioner order or complex needs related to functional status, cognitive ability, or social support system  Outcome: Progressing     Problem: Knowledge Deficit  Goal: Patient/family/caregiver demonstrates understanding of disease process, treatment plan, medications, and discharge instructions  Description: Complete learning assessment and assess knowledge base.  Interventions:  - Provide teaching at level of understanding  - Provide teaching via preferred learning methods  Outcome: Progressing     Problem: RESPIRATORY - ADULT  Goal: Achieves optimal ventilation and oxygenation  Description: INTERVENTIONS:  - Assess for changes in respiratory status  - Assess for changes in mentation and behavior  - Position to facilitate oxygenation and minimize respiratory effort  - Oxygen administered by appropriate delivery if ordered  - Initiate smoking cessation education as indicated  - Encourage broncho-pulmonary hygiene including cough, deep breathe, Incentive Spirometry  - Assess the need for suctioning and aspirate as needed  - Assess and instruct to report SOB or any respiratory difficulty  - Respiratory Therapy support as indicated  Outcome: Progressing     Problem: Nutrition/Hydration-ADULT  Goal: Nutrient/Hydration intake  appropriate for improving, restoring or maintaining nutritional needs  Description: Monitor and assess patient's nutrition/hydration status for malnutrition. Collaborate with interdisciplinary team and initiate plan and interventions as ordered.  Monitor patient's weight and dietary intake as ordered or per policy. Utilize nutrition screening tool and intervene as necessary. Determine patient's food preferences and provide high-protein, high-caloric foods as appropriate.     INTERVENTIONS:  - Monitor oral intake, urinary output, labs, and treatment plans  - Assess nutrition and hydration status and recommend course of action  - Evaluate amount of meals eaten  - Assist patient with eating if necessary   - Allow adequate time for meals  - Recommend/ encourage appropriate diets, oral nutritional supplements, and vitamin/mineral supplements  - Order, calculate, and assess calorie counts as needed  - Recommend, monitor, and adjust tube feedings and TPN/PPN based on assessed needs  - Assess need for intravenous fluids  - Provide specific nutrition/hydration education as appropriate  - Include patient/family/caregiver in decisions related to nutrition  Outcome: Progressing    times a day  - Out of bed for toileting  - Record patient progress and toleration of activity level   Outcome: Progressing

## 2025-07-01 NOTE — PROGRESS NOTES
Nadia Almanzar is a 65 y.o. female who is currently ordered Vancomycin IV with management by the Pharmacy Consult service.  Relevant clinical data and objective / subjective history reviewed.  Vancomycin Assessment:  Indication and Goal AUC/Trough: Soft tissue (goal -600, trough >10); Other, Hx of MRSA  Clinical Status: worsening  Micro:   Pending  Renal Function:  SCr: 0.71 mg/dL  CrCl: 68.8 mL/min  Renal replacement: Not on dialysis  Days of Therapy: 2  Current Dose: 750 mg IV q 12 hrs  Vancomycin Plan:  New Dosing: continue 750 mg IV q 12 hrs  Estimated AUC: 481 mcg*hr/mL  Estimated Trough: 13.9 mcg/mL  Next Level: 7/2 at 0600  Renal Function Monitoring: Daily BMP and UOP  Pharmacy will continue to follow closely for s/sx of nephrotoxicity, infusion reactions and appropriateness of therapy.  BMP and CBC will be ordered per protocol. We will continue to follow the patient’s culture results and clinical progress daily.    Brent Sim, Pharmacist

## 2025-07-01 NOTE — WOUND OSTOMY CARE
Consult Note - Wound   Nadia Almanzar 65 y.o. female MRN: [id number]  Unit/Bed#: -01 Encounter: [id number]      History and Present Illness:  Patient is a 65 year old female admitted with Sepsis. Past medical history of Acute Metabolic Encephalopathy, essential hypertension, Lupus, Sjoren's syndrome, Acute diastolic heart failure, hypokalemia and hypomagnesemia.      Assessment Findings:   Patient sitting up in bed and is accepting of skin assessment being performed by wound/ostomy nurse x2. Patient is independent with turning and repositioning.Continent of bowel and bladder. Good po intake.  1- Skin tear to right distal lower leg- Partial thickness with 95% yellow and 5% bloody wound bed. [place]-wound appears pink and bruised. Scant amount of brown drainage noted to wound. Wound cleansed with saline. Pat dry. Dermagran with silicone foam applied to right distal lower leg wound site.      Skin and Wound Care Plan:   1-Silicone cream/Hydraguard lotion to bilateral heels twice daily and as needed.  2-Elevate heels off of bed/chair surface to offload pressure.  3-Offloading air cushion in chair when out of bed.  4-Moisturize skin daily with skin nourishing cream.  5-Cleanse right distal leg wound with Saline. Pat dry. Apply dermagran with silicone foam to wound every other day and prn.        Wound care team will continue to follow patient.    Wound 06/29/25 Pretibial Distal;Right (Active)   Wound Image   07/01/25 1024   Wound Description Yellow 07/01/25 1024   Non-staged Wound Description Partial thickness 07/01/25 1024   Wound Length (cm) 1.7 cm 07/01/25 1024   Wound Width (cm) 1.2 cm 07/01/25 1024   Wound Depth (cm) 0.1 cm 07/01/25 1024   Wound Surface Area (cm^2) 1.6 cm^2 07/01/25 1024   Wound Volume (cm^3) 0.107 cm^3 07/01/25 1024   Calculated Wound Volume (cm^3) 0.2 cm^3 07/01/25 1024   Drainage Amount Scant 07/01/25 1024   Drainage Description Brown 07/01/25 1024   [place]-wound Assessment Pink;Other  (Comment) 07/01/25 1024   Treatments Cleansed 07/01/25 1024   Dressing Dermagran gauze;Foam, Silicon (eg. Allevyn, etc) 07/01/25 1024   Dressing Changed New 07/01/25 1024   Patient Tolerance Tolerated well 07/01/25 1024   Dressing Status Clean;Dry;Intact 07/01/25 1024       Leni Lopez RN BSN

## 2025-07-01 NOTE — DISCHARGE INSTR - OTHER ORDERS
Skin and Wound Care Plan:   1-Silicone cream/Hydraguard lotion to bilateral heels twice daily and as needed.  2-Elevate heels off of bed/chair surface to offload pressure.  3-Offloading air cushion in chair when out of bed.  4-Moisturize skin daily with skin nourishing cream.  5-Cleanse right distal leg wound with Saline. Pat dry. Apply dermagran with silicone foam to wound every other day and prn.
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
## 2025-07-01 NOTE — ASSESSMENT & PLAN NOTE
Hx seropositive rheumatoid arthritis with multiple sites, relapsing polychondritis, systemic lupus erythematosus  Continue PTA hydroxychloroquine 200 mg daily  Receives IV Rituxan, last dose May 2025  Complaining of severe pain in bilateral hands and knees at time of admission stating she feels like she is in a RA flare   Trial Solu-Medrol for pain--> improved pain and swelling.  Discontinue Solu-Medrol.  Patient does not want any tapering steroids in the setting of infection.  Is not chronically on steroids  Continue PTA gabapentin, Mobic, Robaxin
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
## 2025-07-02 PROBLEM — I50.32 CHRONIC DIASTOLIC HEART FAILURE (HCC): Status: RESOLVED | Noted: 2025-06-13 | Resolved: 2025-07-02

## 2025-07-02 PROBLEM — E83.42 HYPOMAGNESEMIA: Status: RESOLVED | Noted: 2025-06-13 | Resolved: 2025-07-02

## 2025-07-02 PROBLEM — I50.32 CHRONIC DIASTOLIC HEART FAILURE (HCC): Status: ACTIVE | Noted: 2025-06-13

## 2025-07-02 PROBLEM — I50.33 ACUTE ON CHRONIC DIASTOLIC HEART FAILURE (HCC): Status: ACTIVE | Noted: 2025-06-13

## 2025-07-02 PROBLEM — E87.6 HYPOKALEMIA: Status: RESOLVED | Noted: 2025-06-13 | Resolved: 2025-07-02

## 2025-07-02 LAB
ANION GAP SERPL CALCULATED.3IONS-SCNC: 6 MMOL/L (ref 4–13)
BASOPHILS # BLD AUTO: 0.04 THOUSANDS/ÂΜL (ref 0–0.1)
BASOPHILS NFR BLD AUTO: 0 % (ref 0–1)
BUN SERPL-MCNC: 16 MG/DL (ref 5–25)
CALCIUM SERPL-MCNC: 8.6 MG/DL (ref 8.4–10.2)
CHLORIDE SERPL-SCNC: 109 MMOL/L (ref 96–108)
CO2 SERPL-SCNC: 27 MMOL/L (ref 21–32)
CREAT SERPL-MCNC: 0.64 MG/DL (ref 0.6–1.3)
EOSINOPHIL # BLD AUTO: 0.17 THOUSAND/ÂΜL (ref 0–0.61)
EOSINOPHIL NFR BLD AUTO: 2 % (ref 0–6)
ERYTHROCYTE [DISTWIDTH] IN BLOOD BY AUTOMATED COUNT: 12.9 % (ref 11.6–15.1)
GFR SERPL CREATININE-BSD FRML MDRD: 95 ML/MIN/1.73SQ M
GLUCOSE SERPL-MCNC: 83 MG/DL (ref 65–140)
HCT VFR BLD AUTO: 30.2 % (ref 34.8–46.1)
HGB BLD-MCNC: 9.3 G/DL (ref 11.5–15.4)
IMM GRANULOCYTES # BLD AUTO: 0.04 THOUSAND/UL (ref 0–0.2)
IMM GRANULOCYTES NFR BLD AUTO: 0 % (ref 0–2)
LYMPHOCYTES # BLD AUTO: 1.22 THOUSANDS/ÂΜL (ref 0.6–4.47)
LYMPHOCYTES NFR BLD AUTO: 12 % (ref 14–44)
MCH RBC QN AUTO: 29.2 PG (ref 26.8–34.3)
MCHC RBC AUTO-ENTMCNC: 30.8 G/DL (ref 31.4–37.4)
MCV RBC AUTO: 95 FL (ref 82–98)
MONOCYTES # BLD AUTO: 0.85 THOUSAND/ÂΜL (ref 0.17–1.22)
MONOCYTES NFR BLD AUTO: 9 % (ref 4–12)
NEUTROPHILS # BLD AUTO: 7.51 THOUSANDS/ÂΜL (ref 1.85–7.62)
NEUTS SEG NFR BLD AUTO: 77 % (ref 43–75)
NRBC BLD AUTO-RTO: 0 /100 WBCS
PLATELET # BLD AUTO: 335 THOUSANDS/UL (ref 149–390)
PMV BLD AUTO: 9.4 FL (ref 8.9–12.7)
POTASSIUM SERPL-SCNC: 4.2 MMOL/L (ref 3.5–5.3)
PROCALCITONIN SERPL-MCNC: 2.2 NG/ML
RBC # BLD AUTO: 3.18 MILLION/UL (ref 3.81–5.12)
SODIUM SERPL-SCNC: 142 MMOL/L (ref 135–147)
VANCOMYCIN SERPL-MCNC: 17.5 UG/ML (ref 10–20)
WBC # BLD AUTO: 9.83 THOUSAND/UL (ref 4.31–10.16)

## 2025-07-02 PROCEDURE — 99232 SBSQ HOSP IP/OBS MODERATE 35: CPT

## 2025-07-02 PROCEDURE — 85025 COMPLETE CBC W/AUTO DIFF WBC: CPT | Performed by: INTERNAL MEDICINE

## 2025-07-02 PROCEDURE — 80202 ASSAY OF VANCOMYCIN: CPT | Performed by: NURSE PRACTITIONER

## 2025-07-02 PROCEDURE — 80048 BASIC METABOLIC PNL TOTAL CA: CPT | Performed by: INTERNAL MEDICINE

## 2025-07-02 PROCEDURE — 99223 1ST HOSP IP/OBS HIGH 75: CPT | Performed by: INTERNAL MEDICINE

## 2025-07-02 PROCEDURE — 84145 PROCALCITONIN (PCT): CPT | Performed by: INTERNAL MEDICINE

## 2025-07-02 RX ORDER — IBUPROFEN 600 MG/1
600 TABLET, FILM COATED ORAL
Status: DISCONTINUED | OUTPATIENT
Start: 2025-07-02 | End: 2025-07-03 | Stop reason: HOSPADM

## 2025-07-02 RX ORDER — FUROSEMIDE 10 MG/ML
40 INJECTION INTRAMUSCULAR; INTRAVENOUS
Status: DISCONTINUED | OUTPATIENT
Start: 2025-07-02 | End: 2025-07-03 | Stop reason: HOSPADM

## 2025-07-02 RX ADMIN — VANCOMYCIN HYDROCHLORIDE 750 MG: 750 INJECTION, SOLUTION INTRAVENOUS at 23:14

## 2025-07-02 RX ADMIN — GABAPENTIN 600 MG: 300 CAPSULE ORAL at 08:43

## 2025-07-02 RX ADMIN — METHOCARBAMOL 500 MG: 500 TABLET ORAL at 17:23

## 2025-07-02 RX ADMIN — VANCOMYCIN HYDROCHLORIDE 750 MG: 750 INJECTION, SOLUTION INTRAVENOUS at 08:38

## 2025-07-02 RX ADMIN — MELOXICAM 15 MG: 7.5 TABLET ORAL at 08:32

## 2025-07-02 RX ADMIN — BUPRENORPHINE AND NALOXONE 8 MG: 8; 2 FILM BUCCAL; SUBLINGUAL at 17:23

## 2025-07-02 RX ADMIN — GABAPENTIN 600 MG: 300 CAPSULE ORAL at 17:23

## 2025-07-02 RX ADMIN — CEFTRIAXONE 1000 MG: 1 INJECTION, SOLUTION INTRAVENOUS at 22:12

## 2025-07-02 RX ADMIN — BUPRENORPHINE AND NALOXONE 8 MG: 8; 2 FILM BUCCAL; SUBLINGUAL at 08:32

## 2025-07-02 RX ADMIN — IBUPROFEN 600 MG: 600 TABLET ORAL at 22:12

## 2025-07-02 RX ADMIN — FUROSEMIDE 40 MG: 10 INJECTION, SOLUTION INTRAMUSCULAR; INTRAVENOUS at 09:48

## 2025-07-02 RX ADMIN — METHOCARBAMOL 500 MG: 500 TABLET ORAL at 08:32

## 2025-07-02 RX ADMIN — ENOXAPARIN SODIUM 40 MG: 40 INJECTION SUBCUTANEOUS at 08:32

## 2025-07-02 RX ADMIN — HYDROXYCHLOROQUINE SULFATE 200 MG: 200 TABLET, FILM COATED ORAL at 08:32

## 2025-07-02 RX ADMIN — FUROSEMIDE 40 MG: 10 INJECTION, SOLUTION INTRAMUSCULAR; INTRAVENOUS at 16:41

## 2025-07-02 NOTE — PLAN OF CARE
Problem: PAIN - ADULT  Goal: Verbalizes/displays adequate comfort level or baseline comfort level  Description: Interventions:  - Encourage patient to monitor pain and request assistance  - Assess pain using appropriate pain scale  - Administer analgesics as ordered based on type and severity of pain and evaluate response  - Implement non-pharmacological measures as appropriate and evaluate response  - Consider cultural and social influences on pain and pain management  - Notify physician/advanced practitioner if interventions unsuccessful or patient reports new pain  - Educate patient/family on pain management process including their role and importance of  reporting pain   - Provide non-pharmacologic/complimentary pain relief interventions  7/2/2025 0645 by Nayeli Snyder RN  Outcome: Progressing  7/1/2025 2317 by Nayeli Snyder RN  Outcome: Progressing     Problem: INFECTION - ADULT  Goal: Absence of fever/infection during neutropenic period  Description: INTERVENTIONS:  - Monitor WBC  - Perform strict hand hygiene  - Limit to healthy visitors only  - No plants, dried, fresh or silk flowers with parsons in patient room  7/2/2025 0645 by Nayeli Snyder RN  Outcome: Progressing  7/1/2025 2317 by Nayeli Snyder RN  Outcome: Progressing

## 2025-07-02 NOTE — ASSESSMENT & PLAN NOTE
Wt Readings from Last 3 Encounters:   06/29/25 66.1 kg (145 lb 12.8 oz)   06/16/25 62.3 kg (137 lb 6.4 oz)   11/21/24 63.5 kg (140 lb)     Managed on lasix 40mg daily at home, doubles dose with steroid tapers  Did not receive any here with getting iv hydration   Start lasix 40mg iv twice daily for now  Plan to resume home dose on 7/3   Intake and output, daily weights

## 2025-07-02 NOTE — PROGRESS NOTES
Nadia Almanzar is a 65 y.o. female who is currently ordered Vancomycin IV with management by the Pharmacy Consult service.  Relevant clinical data and objective / subjective history reviewed.  Vancomycin Assessment:  Indication and Goal AUC/Trough: Soft tissue (goal -600, trough >10); Other, MRSA  Clinical Status: stable  Micro:     Renal Function:  SCr: 0.64 mg/dL  CrCl: 76.3 mL/min  Renal replacement: Not on dialysis  Days of Therapy: 3  Current Dose: 750 mg IV q 12 hrs  Vancomycin Plan:  New Dosing: continue 750 mg IV q 12 hrs  Estimated AUC: 473 mcg*hr/mL  Estimated Trough: 13.6 mcg/mL  Next Level: 7/9 at 0600  Renal Function Monitoring: Daily BMP and UOP  Pharmacy will continue to follow closely for s/sx of nephrotoxicity, infusion reactions and appropriateness of therapy.  BMP and CBC will be ordered per protocol. We will continue to follow the patient’s culture results and clinical progress daily.    Brent Sim, Pharmacist
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
## 2025-07-02 NOTE — ASSESSMENT & PLAN NOTE
Patient reported yesterday (6/29) accompanied by her  with reports of confusion and disorientation  Patient met SIRS criteria upon presentation  Fever 101.5 F, tachycardia, leukocytosis  Source possible RLE cellulitis with wound versus pneumonia (recent history of)  Reports tick exposure in her yard and garden, lyme negative  UA with small leukocytes, mild pyuria.  Culture is in negative  Empiric ceftriaxone and vancomycin for Hx MRSA.   Trend fever curve, leukocytosis  BLC x2 NG 24 hours

## 2025-07-02 NOTE — CASE MANAGEMENT
Case Management Discharge Planning Note    Patient name Nadia Almanzar  Location /-01 MRN [id number]  : 1960 Date 2025       Current Admission Date: 2025  Current Admission Diagnosis:Sepsis ([facility])   Patient Active Problem List    Diagnosis Date Noted    Hypogammaglobulinemia ([facility]) 2025    Pleural effusion on right 2025    Cellulitis of right lower extremity 2025    Acute metabolic encephalopathy 2025    Pleurisy 2025    Right lower lobe pneumonia 2025    Sepsis ([facility]) 2025    Acute on chronic diastolic heart failure ([facility]) 2025    Chronic right-sided low back pain without sciatica 2025    Cellulitis 2024    Cardiomegaly 2023    Iron deficiency 2023    Opioid use disorder in remission 2022    SBO (small bowel obstruction) ([facility]) 10/14/2022    RA (rheumatoid arthritis) ([facility]) 10/14/2022    Pure hypercholesterolemia 2022    Anxiety and depression 2021    Abdominal wall hernia 2021    Sjogren's syndrome ([facility]) 2020    Lupus 2019    Essential hypertension 2018    Gastroesophageal reflux disease without esophagitis 2018    Chronic obstructive pulmonary disease ([facility]) 2018    Relapsing polychondritis 2017      LOS (days): 3  Geometric Mean LOS (GMLOS) (days):   Days to GMLOS:     OBJECTIVE:  Risk of Unplanned Readmission Score: 14.21         Current admission status: Inpatient   Preferred Pharmacy:   [facility] [address] [place] [address] [place] - [address] [facility]  [address] [facility] [address]  Phone: [phone] Fax: [phone]    Primary Care Provider: Rubin Mendes MD    Primary Insurance: BLUE CROSS  Secondary Insurance:     DISCHARGE DETAILS:                       Chart reviewed aware of medical management. Case was discussed in multidisciplinary discharge meeting.  Clinical information supporting hospitalization: acute metabolic  encephalopathy, sepsis, right lower extremity ankle wound, CHF - IV lasix    Barriers to discharge:  - medical management    Discharge plan: home    CM will continue to follow plan of care.
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
## 2025-07-02 NOTE — ASSESSMENT & PLAN NOTE
Possible source of presentation with sepsis  Right lower extremity lateral ankle with open wound and surrounding erythema/warmth  Empiric ceftriaxone, vancomycin  Hx MRSA, positive this admission  Wound care consultation appreciated  Trend fever curve and leukocytosis
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
## 2025-07-02 NOTE — PLAN OF CARE
Problem: PAIN - ADULT  Goal: Verbalizes/displays adequate comfort level or baseline comfort level  Description: Interventions:  - Encourage patient to monitor pain and request assistance  - Assess pain using appropriate pain scale  - Administer analgesics as ordered based on type and severity of pain and evaluate response  - Implement non-pharmacological measures as appropriate and evaluate response  - Consider cultural and social influences on pain and pain management  - Notify physician/advanced practitioner if interventions unsuccessful or patient reports new pain  - Educate patient/family on pain management process including their role and importance of  reporting pain   - Provide non-pharmacologic/complimentary pain relief interventions  Outcome: Progressing     Problem: INFECTION - ADULT  Goal: Absence or prevention of progression during hospitalization  Description: INTERVENTIONS:  - Assess and monitor for signs and symptoms of infection  - Monitor lab/diagnostic results  - Monitor all insertion sites, i.e. indwelling lines, tubes, and drains  - Monitor endotracheal if appropriate and nasal secretions for changes in amount and color  - Roy appropriate cooling/warming therapies per order  - Administer medications as ordered  - Instruct and encourage patient and family to use good hand hygiene technique  - Identify and instruct in appropriate isolation precautions for identified infection/condition  Outcome: Progressing  Goal: Absence of fever/infection during neutropenic period  Description: INTERVENTIONS:  - Monitor WBC  - Perform strict hand hygiene  - Limit to healthy visitors only  - No plants, dried, fresh or silk flowers with parsons in patient room  Outcome: Progressing     Problem: SAFETY ADULT  Goal: Patient will remain free of falls  Description: INTERVENTIONS:  - Educate patient/family on patient safety including physical limitations  - Instruct patient to call for assistance with activity   -  Consider consulting OT/PT to assist with strengthening/mobility based on AM PAC & JH-HLM score  - Consult OT/PT to assist with strengthening/mobility   - Keep Call bell within reach  - Keep bed low and locked with side rails adjusted as appropriate  - Keep care items and personal belongings within reach  - Initiate and maintain comfort rounds  - Make Fall Risk Sign visible to staff  - Offer Toileting every 2 Hours, in advance of need    - Obtain necessary fall risk management equipment:   - Apply yellow socks and bracelet for high fall risk patients  - Consider moving patient to room near nurses station  Outcome: Progressing  Goal: Maintain or return to baseline ADL function  Description: INTERVENTIONS:  -  Assess patient's ability to carry out ADLs; assess patient's baseline for ADL function and identify physical deficits which impact ability to perform ADLs (bathing, care of mouth/teeth, toileting, grooming, dressing, etc.)  - Assess/evaluate cause of self-care deficits   - Assess range of motion  - Assess patient's mobility; develop plan if impaired  - Assess patient's need for assistive devices and provide as appropriate  - Encourage maximum independence but intervene and supervise when necessary  - Involve family in performance of ADLs  - Assess for home care needs following discharge   - Consider OT consult to assist with ADL evaluation and planning for discharge  - Provide patient education as appropriate  - Monitor functional capacity and physical performance, use of AM PAC & JH-HLM   - Monitor gait, balance and fatigue with ambulation    Outcome: Progressing  Goal: Maintains/Returns to pre admission functional level  Description: INTERVENTIONS:  - Perform AM-PAC 6 Click Basic Mobility/ Daily Activity assessment daily.  - Set and communicate daily mobility goal to care team and patient/family/caregiver.   - Collaborate with rehabilitation services on mobility goals if consulted  - Perform Range of Motion 2  times a day.  - Reposition patient every 2 hours.  - Dangle patient 3 times a day  - Stand patient 3 times a day  - Ambulate patient 3 times a day  - Out of bed to chair 3 times a day   - Out of bed for meals 3  Problem: DISCHARGE PLANNING  Goal: Discharge to home or other facility with appropriate resources  Description: INTERVENTIONS:  - Identify barriers to discharge w/patient and caregiver  - Arrange for needed discharge resources and transportation as appropriate  - Identify discharge learning needs (meds, wound care, etc.)  - Arrange for interpretive services to assist at discharge as needed  - Refer to Case Management Department for coordinating discharge planning if the patient needs post-hospital services based on physician/advanced practitioner order or complex needs related to functional status, cognitive ability, or social support system  Outcome: Progressing     Problem: Knowledge Deficit  Goal: Patient/family/caregiver demonstrates understanding of disease process, treatment plan, medications, and discharge instructions  Description: Complete learning assessment and assess knowledge base.  Interventions:  - Provide teaching at level of understanding  - Provide teaching via preferred learning methods  Outcome: Progressing     Problem: RESPIRATORY - ADULT  Goal: Achieves optimal ventilation and oxygenation  Description: INTERVENTIONS:  - Assess for changes in respiratory status  - Assess for changes in mentation and behavior  - Position to facilitate oxygenation and minimize respiratory effort  - Oxygen administered by appropriate delivery if ordered  - Initiate smoking cessation education as indicated  - Encourage broncho-pulmonary hygiene including cough, deep breathe, Incentive Spirometry  - Assess the need for suctioning and aspirate as needed  - Assess and instruct to report SOB or any respiratory difficulty  - Respiratory Therapy support as indicated  Outcome: Progressing     Problem:  Nutrition/Hydration-ADULT  Goal: Nutrient/Hydration intake appropriate for improving, restoring or maintaining nutritional needs  Description: Monitor and assess patient's nutrition/hydration status for malnutrition. Collaborate with interdisciplinary team and initiate plan and interventions as ordered.  Monitor patient's weight and dietary intake as ordered or per policy. Utilize nutrition screening tool and intervene as necessary. Determine patient's food preferences and provide high-protein, high-caloric foods as appropriate.     INTERVENTIONS:  - Monitor oral intake, urinary output, labs, and treatment plans  - Assess nutrition and hydration status and recommend course of action  - Evaluate amount of meals eaten  - Assist patient with eating if necessary   - Allow adequate time for meals  - Recommend/ encourage appropriate diets, oral nutritional supplements, and vitamin/mineral supplements  - Order, calculate, and assess calorie counts as needed  - Recommend, monitor, and adjust tube feedings and TPN/PPN based on assessed needs  - Assess need for intravenous fluids  - Provide specific nutrition/hydration education as appropriate  - Include patient/family/caregiver in decisions related to nutrition  Outcome: Progressing    times a day  - Out of bed for toileting  - Record patient progress and toleration of activity level   Outcome: Progressing

## 2025-07-03 ENCOUNTER — TELEPHONE (OUTPATIENT)
Dept: PULMONOLOGY | Facility: CLINIC | Age: 65
End: 2025-07-03

## 2025-07-03 VITALS
SYSTOLIC BLOOD PRESSURE: 117 MMHG | RESPIRATION RATE: 18 BRPM | BODY MASS INDEX: 27.61 KG/M2 | OXYGEN SATURATION: 96 % | HEIGHT: 60 IN | WEIGHT: 140.6 LBS | TEMPERATURE: 98.4 F | HEART RATE: 79 BPM | DIASTOLIC BLOOD PRESSURE: 83 MMHG

## 2025-07-03 LAB
ANION GAP SERPL CALCULATED.3IONS-SCNC: 10 MMOL/L (ref 4–13)
BUN SERPL-MCNC: 12 MG/DL (ref 5–25)
CALCIUM SERPL-MCNC: 9.5 MG/DL (ref 8.4–10.2)
CHLORIDE SERPL-SCNC: 99 MMOL/L (ref 96–108)
CO2 SERPL-SCNC: 30 MMOL/L (ref 21–32)
CREAT SERPL-MCNC: 0.67 MG/DL (ref 0.6–1.3)
ERYTHROCYTE [DISTWIDTH] IN BLOOD BY AUTOMATED COUNT: 12.6 % (ref 11.6–15.1)
GFR SERPL CREATININE-BSD FRML MDRD: 93 ML/MIN/1.73SQ M
GLUCOSE SERPL-MCNC: 105 MG/DL (ref 65–140)
HCT VFR BLD AUTO: 35.1 % (ref 34.8–46.1)
HGB BLD-MCNC: 10.9 G/DL (ref 11.5–15.4)
MCH RBC QN AUTO: 28.8 PG (ref 26.8–34.3)
MCHC RBC AUTO-ENTMCNC: 31.1 G/DL (ref 31.4–37.4)
MCV RBC AUTO: 93 FL (ref 82–98)
PLATELET # BLD AUTO: 363 THOUSANDS/UL (ref 149–390)
PMV BLD AUTO: 9.3 FL (ref 8.9–12.7)
POTASSIUM SERPL-SCNC: 3.7 MMOL/L (ref 3.5–5.3)
PROCALCITONIN SERPL-MCNC: 0.86 NG/ML
RBC # BLD AUTO: 3.79 MILLION/UL (ref 3.81–5.12)
SODIUM SERPL-SCNC: 139 MMOL/L (ref 135–147)
WBC # BLD AUTO: 8.67 THOUSAND/UL (ref 4.31–10.16)

## 2025-07-03 PROCEDURE — 85027 COMPLETE CBC AUTOMATED: CPT

## 2025-07-03 PROCEDURE — 87205 SMEAR GRAM STAIN: CPT

## 2025-07-03 PROCEDURE — 94761 N-INVAS EAR/PLS OXIMETRY MLT: CPT

## 2025-07-03 PROCEDURE — 87147 CULTURE TYPE IMMUNOLOGIC: CPT

## 2025-07-03 PROCEDURE — 99239 HOSP IP/OBS DSCHRG MGMT >30: CPT

## 2025-07-03 PROCEDURE — 80048 BASIC METABOLIC PNL TOTAL CA: CPT

## 2025-07-03 PROCEDURE — 87070 CULTURE OTHR SPECIMN AEROBIC: CPT

## 2025-07-03 PROCEDURE — 87186 SC STD MICRODIL/AGAR DIL: CPT

## 2025-07-03 PROCEDURE — 84145 PROCALCITONIN (PCT): CPT

## 2025-07-03 RX ORDER — DOXYCYCLINE 100 MG/1
100 CAPSULE ORAL EVERY 12 HOURS SCHEDULED
Qty: 56 CAPSULE | Refills: 0 | Status: SHIPPED | OUTPATIENT
Start: 2025-07-03 | End: 2025-07-31

## 2025-07-03 RX ADMIN — HYDROXYCHLOROQUINE SULFATE 200 MG: 200 TABLET, FILM COATED ORAL at 08:15

## 2025-07-03 RX ADMIN — VANCOMYCIN HYDROCHLORIDE 750 MG: 750 INJECTION, SOLUTION INTRAVENOUS at 09:06

## 2025-07-03 RX ADMIN — BUPRENORPHINE AND NALOXONE 8 MG: 8; 2 FILM BUCCAL; SUBLINGUAL at 08:15

## 2025-07-03 RX ADMIN — ENOXAPARIN SODIUM 40 MG: 40 INJECTION SUBCUTANEOUS at 08:15

## 2025-07-03 RX ADMIN — METHOCARBAMOL 500 MG: 500 TABLET ORAL at 08:15

## 2025-07-03 RX ADMIN — GABAPENTIN 600 MG: 300 CAPSULE ORAL at 08:15

## 2025-07-03 RX ADMIN — FUROSEMIDE 40 MG: 10 INJECTION, SOLUTION INTRAMUSCULAR; INTRAVENOUS at 08:03

## 2025-07-03 NOTE — PROGRESS NOTES
Nadia Almanzar is a 65 y.o. female who is currently ordered Vancomycin IV with management by the Pharmacy Consult service.  Relevant clinical data and objective / subjective history reviewed.  Vancomycin Assessment:  Indication and Goal AUC/Trough: Soft tissue (goal -600, trough >10); Other, MRSA  Clinical Status: stable  Micro:     Renal Function:  SCr: 0.64 mg/dL on 7/2  CrCl: 75 mL/min  Renal replacement: Not on dialysis  Days of Therapy: 4  Current Dose: 750 mg IV q 12 hrs  Vancomycin Plan:  New Dosing: Continue 750 mg IV q 12 hrs  Estimated AUC: 478 mcg*hr/mL  Estimated Trough: 13.6 mcg/mL  Next Level: 7/9 at 0600  Renal Function Monitoring: Daily BMP and UOP  Pharmacy will continue to follow closely for s/sx of nephrotoxicity, infusion reactions and appropriateness of therapy.  BMP and CBC will be ordered per protocol. We will continue to follow the patient’s culture results and clinical progress daily.    Brent Sim, Pharmacist
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
## 2025-07-03 NOTE — PLAN OF CARE
Problem: PAIN - ADULT  Goal: Verbalizes/displays adequate comfort level or baseline comfort level  Description: Interventions:  - Encourage patient to monitor pain and request assistance  - Assess pain using appropriate pain scale  - Administer analgesics as ordered based on type and severity of pain and evaluate response  - Implement non-pharmacological measures as appropriate and evaluate response  - Consider cultural and social influences on pain and pain management  - Notify physician/advanced practitioner if interventions unsuccessful or patient reports new pain  - Educate patient/family on pain management process including their role and importance of  reporting pain   - Provide non-pharmacologic/complimentary pain relief interventions  Outcome: Progressing     Problem: INFECTION - ADULT  Goal: Absence or prevention of progression during hospitalization  Description: INTERVENTIONS:  - Assess and monitor for signs and symptoms of infection  - Monitor lab/diagnostic results  - Monitor all insertion sites, i.e. indwelling lines, tubes, and drains  - Monitor endotracheal if appropriate and nasal secretions for changes in amount and color  - [place] appropriate cooling/warming therapies per order  - Administer medications as ordered  - Instruct and encourage patient and family to use good hand hygiene technique  - Identify and instruct in appropriate isolation precautions for identified infection/condition  Outcome: Progressing  Goal: Absence of fever/infection during neutropenic period  Description: INTERVENTIONS:  - Monitor WBC  - Perform strict hand hygiene  - Limit to healthy visitors only  - No plants, dried, fresh or silk flowers with parsons in patient room  Outcome: Progressing     Problem: SAFETY ADULT  Goal: Patient will remain free of falls  Description: INTERVENTIONS:  - Educate patient/family on patient safety including physical limitations  - Instruct patient to call for assistance with activity   -  Consider consulting OT/PT to assist with strengthening/mobility based on AM PAC & JH-HLM score  - Consult OT/PT to assist with strengthening/mobility   - Keep Call bell within reach  - Keep bed low and locked with side rails adjusted as appropriate  - Keep care items and personal belongings within reach  - Initiate and maintain comfort rounds  - Make Fall Risk Sign visible to staff  - Offer Toileting every  Hours, in advance of need  - Initiate/Maintain alarm  - Obtain necessary fall risk management equipment:   - Apply yellow socks and bracelet for high fall risk patients  - Consider moving patient to room near nurses station  Outcome: Progressing  Goal: Maintain or return to baseline ADL function  Description: INTERVENTIONS:  -  Assess patient's ability to carry out ADLs; assess patient's baseline for ADL function and identify physical deficits which impact ability to perform ADLs (bathing, care of mouth/teeth, toileting, grooming, dressing, etc.)  - Assess/evaluate cause of self-care deficits   - Assess range of motion  - Assess patient's mobility; develop plan if impaired  - Assess patient's need for assistive devices and provide as appropriate  - Encourage maximum independence but intervene and supervise when necessary  - Involve family in performance of ADLs  - Assess for home care needs following discharge   - Consider OT consult to assist with ADL evaluation and planning for discharge  - Provide patient education as appropriate  - Monitor functional capacity and physical performance, use of AM PAC & JH-HLM   - Monitor gait, balance and fatigue with ambulation    Outcome: Progressing  Goal: Maintains/Returns to pre admission functional level  Description: INTERVENTIONS:  - Perform AM-PAC 6 Click Basic Mobility/ Daily Activity assessment daily.  - Set and communicate daily mobility goal to care team and patient/family/caregiver.   - Collaborate with rehabilitation services on mobility goals if consulted  -  Perform Range of Motion  times a day.  - Reposition patient every  hours.  - Dangle patient  times a day  - Stand patient  times a day  - Ambulate patient  times a day  - Out of bed to chair  times a day   - Out of bed for meals times a day  - Out of bed for toileting  - Record patient progress and toleration of activity level   Outcome: Progressing     Problem: DISCHARGE PLANNING  Goal: Discharge to home or other facility with appropriate resources  Description: INTERVENTIONS:  - Identify barriers to discharge w/patient and caregiver  - Arrange for needed discharge resources and transportation as appropriate  - Identify discharge learning needs (meds, wound care, etc.)  - Arrange for interpretive services to assist at discharge as needed  - Refer to Case Management Department for coordinating discharge planning if the patient needs post-hospital services based on physician/advanced practitioner order or complex needs related to functional status, cognitive ability, or social support system  Outcome: Progressing     Problem: Knowledge Deficit  Goal: Patient/family/caregiver demonstrates understanding of disease process, treatment plan, medications, and discharge instructions  Description: Complete learning assessment and assess knowledge base.  Interventions:  - Provide teaching at level of understanding  - Provide teaching via preferred learning methods  Outcome: Progressing     Problem: RESPIRATORY - ADULT  Goal: Achieves optimal ventilation and oxygenation  Description: INTERVENTIONS:  - Assess for changes in respiratory status  - Assess for changes in mentation and behavior  - Position to facilitate oxygenation and minimize respiratory effort  - Oxygen administered by appropriate delivery if ordered  - Initiate smoking cessation education as indicated  - Encourage broncho-pulmonary hygiene including cough, deep breathe, Incentive Spirometry  - Assess the need for suctioning and aspirate as needed  - Assess and  instruct to report SOB or any respiratory difficulty  - Respiratory Therapy support as indicated  Outcome: Progressing     Problem: Nutrition/Hydration-ADULT  Goal: Nutrient/Hydration intake appropriate for improving, restoring or maintaining nutritional needs  Description: Monitor and assess patient's nutrition/hydration status for malnutrition. Collaborate with interdisciplinary team and initiate plan and interventions as ordered.  Monitor patient's weight and dietary intake as ordered or per policy. Utilize nutrition screening tool and intervene as necessary. Determine patient's food preferences and provide high-protein, high-caloric foods as appropriate.     INTERVENTIONS:  - Monitor oral intake, urinary output, labs, and treatment plans  - Assess nutrition and hydration status and recommend course of action  - Evaluate amount of meals eaten  - Assist patient with eating if necessary   - Allow adequate time for meals  - Recommend/ encourage appropriate diets, oral nutritional supplements, and vitamin/mineral supplements  - Order, calculate, and assess calorie counts as needed  - Recommend, monitor, and adjust tube feedings and TPN/PPN based on assessed needs  - Assess need for intravenous fluids  - Provide specific nutrition/hydration education as appropriate  - Include patient/family/caregiver in decisions related to nutrition  Outcome: Progressing

## 2025-07-03 NOTE — ASSESSMENT & PLAN NOTE
Possible source of presentation with sepsis  Right lower extremity lateral ankle with open wound and surrounding erythema/warmth  Empiric ceftriaxone, vancomycin  Transition to augmentin and doxy on discharge for 4 weeks   Hx MRSA, positive this admission  Wound care consultation appreciated

## 2025-07-03 NOTE — PLAN OF CARE
Problem: PAIN - ADULT  Goal: Verbalizes/displays adequate comfort level or baseline comfort level  Description: Interventions:  - Encourage patient to monitor pain and request assistance  - Assess pain using appropriate pain scale  - Administer analgesics as ordered based on type and severity of pain and evaluate response  - Implement non-pharmacological measures as appropriate and evaluate response  - Consider cultural and social influences on pain and pain management  - Notify physician/advanced practitioner if interventions unsuccessful or patient reports new pain  - Educate patient/family on pain management process including their role and importance of  reporting pain   - Provide non-pharmacologic/complimentary pain relief interventions  Outcome: Progressing     Problem: INFECTION - ADULT  Goal: Absence or prevention of progression during hospitalization  Description: INTERVENTIONS:  - Assess and monitor for signs and symptoms of infection  - Monitor lab/diagnostic results  - Monitor all insertion sites, i.e. indwelling lines, tubes, and drains  - Monitor endotracheal if appropriate and nasal secretions for changes in amount and color  - [place] appropriate cooling/warming therapies per order  - Administer medications as ordered  - Instruct and encourage patient and family to use good hand hygiene technique  - Identify and instruct in appropriate isolation precautions for identified infection/condition  Outcome: Progressing  Goal: Absence of fever/infection during neutropenic period  Description: INTERVENTIONS:  - Monitor WBC  - Perform strict hand hygiene  - Limit to healthy visitors only  - No plants, dried, fresh or silk flowers with parsons in patient room  Outcome: Progressing     Problem: SAFETY ADULT  Goal: Patient will remain free of falls  Description: INTERVENTIONS:  - Educate patient/family on patient safety including physical limitations  - Instruct patient to call for assistance with activity   -  Consider consulting OT/PT to assist with strengthening/mobility based on AM PAC & JH-HLM score  - Consult OT/PT to assist with strengthening/mobility   - Keep Call bell within reach  - Keep bed low and locked with side rails adjusted as appropriate  - Keep care items and personal belongings within reach  - Initiate and maintain comfort rounds  - Make Fall Risk Sign visible to staff  - Offer Toileting every 2 Hours, in advance of need  - Initiate/Maintain alarm  - Obtain necessary fall risk management equipment:   - Apply yellow socks and bracelet for high fall risk patients  - Consider moving patient to room near nurses station  Outcome: Progressing  Goal: Maintain or return to baseline ADL function  Description: INTERVENTIONS:  -  Assess patient's ability to carry out ADLs; assess patient's baseline for ADL function and identify physical deficits which impact ability to perform ADLs (bathing, care of mouth/teeth, toileting, grooming, dressing, etc.)  - Assess/evaluate cause of self-care deficits   - Assess range of motion  - Assess patient's mobility; develop plan if impaired  - Assess patient's need for assistive devices and provide as appropriate  - Encourage maximum independence but intervene and supervise when necessary  - Involve family in performance of ADLs  - Assess for home care needs following discharge   - Consider OT consult to assist with ADL evaluation and planning for discharge  - Provide patient education as appropriate  - Monitor functional capacity and physical performance, use of AM PAC & JH-HLM   - Monitor gait, balance and fatigue with ambulation    Outcome: Progressing  Goal: Maintains/Returns to pre admission functional level  Description: INTERVENTIONS:  - Perform AM-PAC 6 Click Basic Mobility/ Daily Activity assessment daily.  - Set and communicate daily mobility goal to care team and patient/family/caregiver.   - Collaborate with rehabilitation services on mobility goals if consulted  -  Perform Range of Motion 3 times a day.  - Reposition patient every 2 hours.  - Dangle patient 3 times a day  - Stand patient 3 times a day  - Ambulate patient 3 times a day  - Out of bed to chair 3 times a day   - Out of bed for meals 3 times a day  - Out of bed for toileting  - Record patient progress and toleration of activity level   Outcome: Progressing     Problem: DISCHARGE PLANNING  Goal: Discharge to home or other facility with appropriate resources  Description: INTERVENTIONS:  - Identify barriers to discharge w/patient and caregiver  - Arrange for needed discharge resources and transportation as appropriate  - Identify discharge learning needs (meds, wound care, etc.)  - Arrange for interpretive services to assist at discharge as needed  - Refer to Case Management Department for coordinating discharge planning if the patient needs post-hospital services based on physician/advanced practitioner order or complex needs related to functional status, cognitive ability, or social support system  Outcome: Progressing     Problem: Knowledge Deficit  Goal: Patient/family/caregiver demonstrates understanding of disease process, treatment plan, medications, and discharge instructions  Description: Complete learning assessment and assess knowledge base.  Interventions:  - Provide teaching at level of understanding  - Provide teaching via preferred learning methods  Outcome: Progressing     Problem: RESPIRATORY - ADULT  Goal: Achieves optimal ventilation and oxygenation  Description: INTERVENTIONS:  - Assess for changes in respiratory status  - Assess for changes in mentation and behavior  - Position to facilitate oxygenation and minimize respiratory effort  - Oxygen administered by appropriate delivery if ordered  - Initiate smoking cessation education as indicated  - Encourage broncho-pulmonary hygiene including cough, deep breathe, Incentive Spirometry  - Assess the need for suctioning and aspirate as needed  -  Assess and instruct to report SOB or any respiratory difficulty  - Respiratory Therapy support as indicated  Outcome: Progressing     Problem: Nutrition/Hydration-ADULT  Goal: Nutrient/Hydration intake appropriate for improving, restoring or maintaining nutritional needs  Description: Monitor and assess patient's nutrition/hydration status for malnutrition. Collaborate with interdisciplinary team and initiate plan and interventions as ordered.  Monitor patient's weight and dietary intake as ordered or per policy. Utilize nutrition screening tool and intervene as necessary. Determine patient's food preferences and provide high-protein, high-caloric foods as appropriate.     INTERVENTIONS:  - Monitor oral intake, urinary output, labs, and treatment plans  - Assess nutrition and hydration status and recommend course of action  - Evaluate amount of meals eaten  - Assist patient with eating if necessary   - Allow adequate time for meals  - Recommend/ encourage appropriate diets, oral nutritional supplements, and vitamin/mineral supplements  - Order, calculate, and assess calorie counts as needed  - Recommend, monitor, and adjust tube feedings and TPN/PPN based on assessed needs  - Assess need for intravenous fluids  - Provide specific nutrition/hydration education as appropriate  - Include patient/family/caregiver in decisions related to nutrition  Outcome: Progressing

## 2025-07-03 NOTE — RESPIRATORY THERAPY NOTE
Home Oxygen Qualifying Test     Patient name: Nadia Almanzar        : 1960   Date of Test:  July 3, 2025  Diagnosis:    Home Oxygen Test:    **Medicare Guidelines require item(s) 1-5 on all ambulatory patients or 1 and 2 on non-ambulatory patients.    1. Baseline SPO2 on Room Air at rest  95%   If <= 88% on Room Air add O2 via NC to obtain SpO2 >=88%. If LPM needed, document LPM  needed to reach =>88%    SPO2 during exertion on Room Air 94  %  During exertion monitor SPO2. If SPO2 increases >=89%, do not add supplemental oxygen    SPO2 on Oxygen at Rest  % at  LPM    SPO2 during exertion on Oxygen  % at  LPM    Test performed during exertion activity.      []  Supplemental Home Oxygen is indicated.    [x]  Client does not qualify for home oxygen.    Respiratory Additional Notes-     Thalia Goldman, RT

## 2025-07-03 NOTE — ASSESSMENT & PLAN NOTE
Wt Readings from Last 3 Encounters:   07/03/25 63.8 kg (140 lb 9.6 oz)   06/16/25 62.3 kg (137 lb 6.4 oz)   11/21/24 63.5 kg (140 lb)     Managed on lasix 40mg daily at home, doubles dose with steroid tapers  Did not receive any here with getting iv hydration   Start lasix 40mg iv twice daily for now  resume home dose on 7/3

## 2025-07-03 NOTE — DISCHARGE INSTR - AVS FIRST PAGE
Dear Nadia Almanzar,     It was our pleasure to care for you here at [facility]. For follow up as well as any medication refills, we recommend that you follow up with your primary care physician. Here are the most important instructions/ recommendations at discharge:     Notable Medication Adjustments -   Start augmentin twice daily for 4 weeks  Start doxycycline twice daily for 4 weeks   Testing Required after Discharge - ** Please contact your PCP to request testing orders for any of the testing recommended here **  None   Important follow up information -   Follow up with pulmonology  Follow up with family doctor  Other Instructions -   If symptoms worsen or new symptoms arise, come back to the hospital  Please review this entire after visit summary as additional general instructions including medication list, appointments, activity, diet, any pertinent wound care, and other additional recommendations from your care team that may be provided for you.      Sincerely,     Jumana Kaiser PA-C

## 2025-07-03 NOTE — ASSESSMENT & PLAN NOTE
Patient reported yesterday (6/29) accompanied by her  with reports of confusion and disorientation  Patient met SIRS criteria upon presentation  Fever 101.5 F, tachycardia, leukocytosis  Source possible RLE cellulitis with wound versus pneumonia (recent history of)  Reports tick exposure in her yard and garden, lyme negative  UA with small leukocytes, mild pyuria.  Culture is in negative  Empiric ceftriaxone and vancomycin for Hx MRSA.   Transition to augmentin and doxycycline for 4 weeks   BLC x2 NG 72 hours

## 2025-07-03 NOTE — TELEPHONE ENCOUNTER
----- Message from Idris Liz MD sent at 7/3/2025  2:22 PM EDT -----  Regarding: Follow up  Good afternoon -     This patient is being discharged today from [facility] and will need a follow up in about 4-5 weeks after completion of a CT scan in 4 weeks.  Can you please help arrange this?    Thanks!
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
## 2025-07-05 LAB
BACTERIA BLD CULT: NORMAL
BACTERIA BLD CULT: NORMAL
BACTERIA SPT RESP CULT: ABNORMAL
BACTERIA SPT RESP CULT: ABNORMAL
GRAM STN SPEC: ABNORMAL

## 2025-07-07 NOTE — UTILIZATION REVIEW
NOTIFICATION OF ADMISSION DISCHARGE   This is a Notification of Discharge from [facility]. Please be advised that this patient has been discharge from our facility. Below you will find the admission and discharge date and time including the patient’s disposition.   UTILIZATION REVIEW CONTACT:  Utilization Review Assistants  Network Utilization Review Department  Phone: [phone] x carefully listen to the prompts. All voicemails are confidential.  Email: [email or web address]     ADMISSION INFORMATION  PRESENTATION DATE: 6/29/2025  8:41 PM  OBERVATION ADMISSION DATE: N/A  INPATIENT ADMISSION DATE: 6/29/25  9:36 PM   DISCHARGE DATE: 7/3/2025  4:07 PM   DISPOSITION:Home/Self Care    Network Utilization Review Department  ATTENTION: Please call with any questions or concerns to [phone] and carefully listen to the prompts so that you are directed to the right person. All voicemails are confidential.   For Discharge needs, contact Care Management DC Support Team at [phone] opt. 2  Send all requests for admission clinical reviews, approved or denied determinations and any other requests to dedicated fax number below belonging to the campus where the patient is receiving treatment. List of dedicated fax numbers for the Facilities:  FACILITY NAME UR FAX NUMBER   ADMISSION DENIALS (Administrative/Medical Necessity) [phone]   DISCHARGE SUPPORT TEAM ([facility]) [phone]   PARENT CHILD HEALTH (Maternity/NICU/Pediatrics) [phone]   [facility] [phone]   [facility] [phone]   [facility] [phone]   [facility] [phone]   [facility] [phone]   [facility] [phone]   [facility] [phone]   [facility] [phone]   [facility] [phone]   [facility] [phone]   [facility] [phone]   [facility] [phone]

## 2025-07-14 PROBLEM — J18.9 RIGHT LOWER LOBE PNEUMONIA: Status: RESOLVED | Noted: 2025-06-13 | Resolved: 2025-07-14
